# Patient Record
Sex: MALE | Race: WHITE | Employment: OTHER | URBAN - METROPOLITAN AREA
[De-identification: names, ages, dates, MRNs, and addresses within clinical notes are randomized per-mention and may not be internally consistent; named-entity substitution may affect disease eponyms.]

---

## 2017-11-16 ENCOUNTER — OFFICE VISIT (OUTPATIENT)
Dept: SURGERY | Age: 67
End: 2017-11-16

## 2017-11-16 ENCOUNTER — TELEPHONE (OUTPATIENT)
Dept: SURGERY | Age: 67
End: 2017-11-16

## 2017-11-16 VITALS
SYSTOLIC BLOOD PRESSURE: 147 MMHG | HEART RATE: 83 BPM | DIASTOLIC BLOOD PRESSURE: 84 MMHG | HEIGHT: 72 IN | WEIGHT: 225 LBS | BODY MASS INDEX: 30.48 KG/M2

## 2017-11-16 DIAGNOSIS — Z86.010 HISTORY OF COLON POLYPS: ICD-10-CM

## 2017-11-16 DIAGNOSIS — K42.9 UMBILICAL HERNIA WITHOUT OBSTRUCTION AND WITHOUT GANGRENE: Primary | ICD-10-CM

## 2017-11-16 PROCEDURE — 99204 OFFICE O/P NEW MOD 45 MIN: CPT | Performed by: SURGERY

## 2017-11-16 ASSESSMENT — ENCOUNTER SYMPTOMS
ANAL BLEEDING: 0
APNEA: 0
PHOTOPHOBIA: 0
CHOKING: 0
BACK PAIN: 0
CONSTIPATION: 0
SORE THROAT: 0
EYE ITCHING: 0
STRIDOR: 0
ABDOMINAL PAIN: 1
EYE REDNESS: 0
COLOR CHANGE: 0
RECTAL PAIN: 0

## 2017-11-16 NOTE — PROGRESS NOTES
Chief Complaint   Patient presents with    Colon Cancer Screening     n/p-consult c-scope - hernia        SUBJECTIVE:  HPI: Pt presents today for evaluation and possible need of colonoscopy. Pt has several had colonoscopy in the past.  Patient has been experiencing no pain or bleedign. Denies fever. There is no family history of colon cancer. Thoroughly reviewed the patient's medical history, family history, social history and review of systems with the patient today in the office. Please see medical record for pertinent positives. Past Medical History:   Diagnosis Date    Arthritis     Hypertension     Unspecified cerebral artery occlusion with cerebral infarction 1997    no residual effects      Past Surgical History:   Procedure Laterality Date    BUNIONECTOMY      right great toe    COLONOSCOPY      JOINT REPLACEMENT      rigth total knee     Current Outpatient Prescriptions   Medication Sig Dispense Refill    clopidogrel (PLAVIX) 75 MG tablet Take 75 mg by mouth daily.  amLODIPine-atorvastatatin (CADUET) 5-10 MG per tablet Take 1 tablet by mouth daily.  meloxicam (MOBIC) 15 MG tablet Take 15 mg by mouth daily.  esomeprazole Magnesium (NEXIUM) 40 MG PACK Take 40 mg by mouth daily.  diazepam (VALIUM) 10 MG tablet Take 10 mg by mouth every 6 hours as needed.  traMADol (ULTRAM) 50 MG tablet Take 50 mg by mouth every 6 hours as needed.  gabapentin (NEURONTIN) 300 MG capsule Take 300 mg by mouth 3 times daily.  multivitamin (THERAGRAN) per tablet Take 1 tablet by mouth daily.  acetaminophen (TYLENOL) 325 MG tablet Take 500 mg by mouth every 6 hours as needed. No current facility-administered medications for this visit. No Known Allergies    Review of Systems:         Review of Systems   Constitutional: Negative for chills and fever. HENT: Negative for ear pain, mouth sores, sore throat and tinnitus.     Eyes: Negative for photophobia, redness and itching. Respiratory: Negative for apnea, choking and stridor. Cardiovascular: Negative for chest pain and palpitations. Gastrointestinal: Positive for abdominal pain. Negative for anal bleeding, constipation and rectal pain. Endocrine: Negative for polydipsia. Genitourinary: Negative for enuresis, flank pain and hematuria. Musculoskeletal: Negative for back pain, joint swelling and myalgias. Skin: Negative for color change and pallor. Allergic/Immunologic: Negative for environmental allergies. Neurological: Negative for syncope and speech difficulty. Psychiatric/Behavioral: Negative for confusion and hallucinations. OBJECTIVE:  Physical Exam:    BP (!) 147/84   Pulse 83   Ht 6' (1.829 m)   Wt 225 lb (102.1 kg)   BMI 30.52 kg/m²      Physical Exam   Constitutional: He is oriented to person, place, and time. He appears well-developed and well-nourished. HENT:   Head: Normocephalic. Eyes: Pupils are equal, round, and reactive to light. Neck: Normal range of motion. Neck supple. Cardiovascular: Normal rate. Pulmonary/Chest: Effort normal.   Abdominal: Soft. He exhibits no distension and no mass. There is no tenderness. There is no rebound and no guarding. Musculoskeletal: Normal range of motion. Neurological: He is alert and oriented to person, place, and time. Skin: Skin is warm. Psychiatric: He has a normal mood and affect. ASSESSMENT:  1. Umbilical hernia without obstruction and without gangrene    2. History of colon polyps          PLAN:  Treatment:  Will proceed with colonoscopy. Pt wants to hold off the umbilical hernia for now. Pt does c/o left inguinal hernia bulge. Patient counseled on risks, benefits, and alternatives of treatment plan at length. Patient states an understanding and willingness to proceed with plan. Consent signed and prep instructions provided.      No orders of the defined types were placed in this

## 2017-11-16 NOTE — TELEPHONE ENCOUNTER
SPOKE TO Ale[facility] 76 @ . NOTIFIED OF DATES, TIMES AND LOCATION.     PHONE ASSESSMENT  CSCOPE - 11/27/17 @ 812  P/O - 12/7/17 @ [address] [facility] START ON 11/26/17 4PM/3AM  NPO AFTER MIDNIGHT  HOLD PLAVIX 5 DAYS PRIOR   AND PREP GIVEN TO PATIENT

## 2017-11-27 ENCOUNTER — HOSPITAL ENCOUNTER (OUTPATIENT)
Dept: OTHER | Age: 67
Discharge: HOME | End: 2017-11-27
Attending: SURGERY | Admitting: SURGERY

## 2017-12-07 ENCOUNTER — OFFICE VISIT (OUTPATIENT)
Dept: SURGERY | Age: 67
End: 2017-12-07

## 2017-12-07 VITALS
BODY MASS INDEX: 30.49 KG/M2 | DIASTOLIC BLOOD PRESSURE: 80 MMHG | SYSTOLIC BLOOD PRESSURE: 130 MMHG | HEART RATE: 76 BPM | HEIGHT: 72 IN | WEIGHT: 225.09 LBS

## 2017-12-07 DIAGNOSIS — D12.4 ADENOMATOUS POLYP OF DESCENDING COLON: Primary | ICD-10-CM

## 2017-12-07 PROCEDURE — 99213 OFFICE O/P EST LOW 20 MIN: CPT | Performed by: SURGERY

## 2017-12-07 ASSESSMENT — ENCOUNTER SYMPTOMS
STRIDOR: 0
COLOR CHANGE: 0
CONSTIPATION: 0
BACK PAIN: 0
EYE ITCHING: 0
RECTAL PAIN: 0
ANAL BLEEDING: 0
PHOTOPHOBIA: 0
APNEA: 0
SORE THROAT: 0
EYE REDNESS: 0
CHOKING: 0

## 2017-12-08 NOTE — PROGRESS NOTES
Chief Complaint   Patient presents with    Colon Cancer Screening     post c-scope GC 11/27/17        SUBJECTIVE:  HPI: Patient presents for follow up after colonoscopy. Patient is feeling well, denies severe abdominal pain, bloating, rectal bleeding after the procedure. Path reveals:   Final Pathologic Diagnosis:  Sigmoid colon, polyp:      Hyperplastic polyp    Thoroughly reviewed the patient's medical history, family history, social history and review of systems with the patient today in the office. Please see medical record for pertinent positives. Past Medical History:   Diagnosis Date    Arthritis     Hypertension     Unspecified cerebral artery occlusion with cerebral infarction 1997    no residual effects      Past Surgical History:   Procedure Laterality Date    BUNIONECTOMY      right great toe    COLONOSCOPY      JOINT REPLACEMENT      rigth total knee     Current Outpatient Prescriptions   Medication Sig Dispense Refill    clopidogrel (PLAVIX) 75 MG tablet Take 75 mg by mouth daily.  amLODIPine-atorvastatatin (CADUET) 5-10 MG per tablet Take 1 tablet by mouth daily.  meloxicam (MOBIC) 15 MG tablet Take 15 mg by mouth daily.  esomeprazole Magnesium (NEXIUM) 40 MG PACK Take 40 mg by mouth daily.  diazepam (VALIUM) 10 MG tablet Take 10 mg by mouth every 6 hours as needed.  traMADol (ULTRAM) 50 MG tablet Take 50 mg by mouth every 6 hours as needed.  gabapentin (NEURONTIN) 300 MG capsule Take 300 mg by mouth 3 times daily.  multivitamin (THERAGRAN) per tablet Take 1 tablet by mouth daily.  acetaminophen (TYLENOL) 325 MG tablet Take 500 mg by mouth every 6 hours as needed. No current facility-administered medications for this visit. No Known Allergies    Review of Systems:         Review of Systems   Constitutional: Negative for chills and fever. HENT: Negative for ear pain, mouth sores, sore throat and tinnitus.

## 2018-12-18 ENCOUNTER — HOSPITAL ENCOUNTER (OUTPATIENT)
Dept: GENERAL RADIOLOGY | Age: 68
Discharge: HOME | End: 2018-12-18
Payer: MEDICARE

## 2018-12-18 ENCOUNTER — HOSPITAL ENCOUNTER (OUTPATIENT)
Age: 68
Discharge: HOME | End: 2018-12-18
Payer: MEDICARE

## 2018-12-18 DIAGNOSIS — M75.21 BICEPS TENDINITIS OF RIGHT UPPER EXTREMITY: ICD-10-CM

## 2018-12-18 PROCEDURE — 73030 X-RAY EXAM OF SHOULDER: CPT

## 2019-01-16 ENCOUNTER — HOSPITAL ENCOUNTER (INPATIENT)
Age: 69
LOS: 2 days | Discharge: HOME | DRG: 200 | End: 2019-01-18
Attending: EMERGENCY MEDICINE | Admitting: INTERNAL MEDICINE
Payer: MEDICARE

## 2019-01-16 ENCOUNTER — APPOINTMENT (OUTPATIENT)
Dept: GENERAL RADIOLOGY | Age: 69
DRG: 200 | End: 2019-01-16
Payer: MEDICARE

## 2019-01-16 ENCOUNTER — APPOINTMENT (OUTPATIENT)
Dept: CT IMAGING | Age: 69
DRG: 200 | End: 2019-01-16
Payer: MEDICARE

## 2019-01-16 DIAGNOSIS — J94.2 HEMOTHORAX ON RIGHT: ICD-10-CM

## 2019-01-16 DIAGNOSIS — S22.41XA CLOSED FRACTURE OF MULTIPLE RIBS OF RIGHT SIDE, INITIAL ENCOUNTER: ICD-10-CM
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
## 2019-01-16 LAB
ALBUMIN SERPL-MCNC: 4.2 GM/DL (ref 3.4–5)
ALP BLD-CCNC: 61 IU/L (ref 40–129)
ALT SERPL-CCNC: 22 U/L (ref 10–40)
ANION GAP SERPL CALCULATED.3IONS-SCNC: 12 MMOL/L (ref 4–16)
APTT: 26.3 SECONDS (ref 21.2–33)
AST SERPL-CCNC: 25 IU/L (ref 15–37)
BASOPHILS ABSOLUTE: 0 K/CU MM
BASOPHILS RELATIVE PERCENT: 0.1 % (ref 0–1)
BILIRUB SERPL-MCNC: 0.4 MG/DL (ref 0–1)
BILIRUBIN DIRECT: 0.2 MG/DL (ref 0–0.3)
BILIRUBIN, INDIRECT: 0.2 MG/DL (ref 0–0.7)
BUN BLDV-MCNC: 17 MG/DL (ref 6–23)
CALCIUM SERPL-MCNC: 9 MG/DL (ref 8.3–10.6)
CHLORIDE BLD-SCNC: 101 MMOL/L (ref 99–110)
CO2: 26 MMOL/L (ref 21–32)
CREAT SERPL-MCNC: 0.8 MG/DL (ref 0.9–1.3)
DIFFERENTIAL TYPE: ABNORMAL
EOSINOPHILS ABSOLUTE: 0 K/CU MM
EOSINOPHILS RELATIVE PERCENT: 0.4 % (ref 0–3)
GFR AFRICAN AMERICAN: >60 ML/MIN/1.73M2
GFR NON-AFRICAN AMERICAN: >60 ML/MIN/1.73M2
GLUCOSE BLD-MCNC: 115 MG/DL (ref 70–99)
HCT VFR BLD CALC: 46.6 % (ref 42–52)
HCT VFR BLD CALC: 47.6 % (ref 42–52)
HEMOGLOBIN: 15.3 GM/DL (ref 13.5–18)
HEMOGLOBIN: 15.7 GM/DL (ref 13.5–18)
IMMATURE NEUTROPHIL %: 0.4 % (ref 0–0.43)
INR BLD: 0.93 INDEX
LYMPHOCYTES ABSOLUTE: 1.2 K/CU MM
LYMPHOCYTES RELATIVE PERCENT: 13.2 % (ref 24–44)
MCH RBC QN AUTO: 32.2 PG (ref 27–31)
MCHC RBC AUTO-ENTMCNC: 33 % (ref 32–36)
MCV RBC AUTO: 97.5 FL (ref 78–100)
MONOCYTES ABSOLUTE: 0.8 K/CU MM
MONOCYTES RELATIVE PERCENT: 8.3 % (ref 0–4)
NUCLEATED RBC %: 0 %
PDW BLD-RTO: 13.3 % (ref 11.7–14.9)
PLATELET # BLD: 165 K/CU MM (ref 140–440)
PMV BLD AUTO: 9.7 FL (ref 7.5–11.1)
POTASSIUM SERPL-SCNC: 4.3 MMOL/L (ref 3.5–5.1)
PROTHROMBIN TIME: 10.6 SECONDS (ref 9.12–12.5)
RBC # BLD: 4.88 M/CU MM (ref 4.6–6.2)
SEGMENTED NEUTROPHILS ABSOLUTE COUNT: 7.2 K/CU MM
SEGMENTED NEUTROPHILS RELATIVE PERCENT: 77.6 % (ref 36–66)
SODIUM BLD-SCNC: 139 MMOL/L (ref 135–145)
TOTAL IMMATURE NEUTOROPHIL: 0.04 K/CU MM
TOTAL NUCLEATED RBC: 0 K/CU MM
TOTAL PROTEIN: 7 GM/DL (ref 6.4–8.2)
TOTAL RETICULOCYTE COUNT: 0.12 K/CU MM
WBC # BLD: 9.3 K/CU MM (ref 4–10.5)

## 2019-01-16 PROCEDURE — 85025 COMPLETE CBC W/AUTO DIFF WBC: CPT

## 2019-01-16 PROCEDURE — 1200000000 HC SEMI PRIVATE

## 2019-01-16 PROCEDURE — 6370000000 HC RX 637 (ALT 250 FOR IP): Performed by: PHYSICIAN ASSISTANT

## 2019-01-16 PROCEDURE — 85018 HEMOGLOBIN: CPT

## 2019-01-16 PROCEDURE — 2580000003 HC RX 258: Performed by: INTERNAL MEDICINE

## 2019-01-16 PROCEDURE — 85730 THROMBOPLASTIN TIME PARTIAL: CPT

## 2019-01-16 PROCEDURE — 99285 EMERGENCY DEPT VISIT HI MDM: CPT

## 2019-01-16 PROCEDURE — 82248 BILIRUBIN DIRECT: CPT

## 2019-01-16 PROCEDURE — 6360000002 HC RX W HCPCS: Performed by: INTERNAL MEDICINE

## 2019-01-16 PROCEDURE — 86900 BLOOD TYPING SEROLOGIC ABO: CPT

## 2019-01-16 PROCEDURE — 86850 RBC ANTIBODY SCREEN: CPT

## 2019-01-16 PROCEDURE — 94640 AIRWAY INHALATION TREATMENT: CPT

## 2019-01-16 PROCEDURE — 86901 BLOOD TYPING SEROLOGIC RH(D): CPT

## 2019-01-16 PROCEDURE — 6370000000 HC RX 637 (ALT 250 FOR IP)

## 2019-01-16 PROCEDURE — 71250 CT THORAX DX C-: CPT

## 2019-01-16 PROCEDURE — 85014 HEMATOCRIT: CPT

## 2019-01-16 PROCEDURE — 80053 COMPREHEN METABOLIC PANEL: CPT

## 2019-01-16 PROCEDURE — 85610 PROTHROMBIN TIME: CPT

## 2019-01-16 PROCEDURE — 36415 COLL VENOUS BLD VENIPUNCTURE: CPT

## 2019-01-16 PROCEDURE — 6370000000 HC RX 637 (ALT 250 FOR IP): Performed by: INTERNAL MEDICINE

## 2019-01-16 RX ORDER — MORPHINE SULFATE 4 MG/ML
4 INJECTION, SOLUTION INTRAMUSCULAR; INTRAVENOUS EVERY 30 MIN PRN
Status: DISCONTINUED | OUTPATIENT
Start: 2019-01-16 | End: 2019-01-16

## 2019-01-16 RX ORDER — AMLODIPINE BESYLATE AND ATORVASTATIN CALCIUM 5; 10 MG/1; MG/1
1 TABLET, FILM COATED ORAL DAILY
Status: DISCONTINUED | OUTPATIENT
Start: 2019-01-16 | End: 2019-01-16 | Stop reason: CLARIF

## 2019-01-16 RX ORDER — SODIUM CHLORIDE 0.9 % (FLUSH) 0.9 %
10 SYRINGE (ML) INJECTION PRN
Status: DISCONTINUED | OUTPATIENT
Start: 2019-01-16 | End: 2019-01-18 | Stop reason: HOSPADM

## 2019-01-16 RX ORDER — HYDROCHLOROTHIAZIDE 12.5 MG/1
12.5 TABLET ORAL DAILY
Status: ON HOLD | COMMUNITY
Start: 2018-11-09 | End: 2020-03-17 | Stop reason: HOSPADM

## 2019-01-16 RX ORDER — SODIUM CHLORIDE 0.9 % (FLUSH) 0.9 %
10 SYRINGE (ML) INJECTION EVERY 12 HOURS SCHEDULED
Status: DISCONTINUED | OUTPATIENT
Start: 2019-01-16 | End: 2019-01-18 | Stop reason: HOSPADM

## 2019-01-16 RX ORDER — OXYCODONE HYDROCHLORIDE 5 MG/1
5 TABLET ORAL
Status: DISCONTINUED | OUTPATIENT
Start: 2019-01-16 | End: 2019-01-16

## 2019-01-16 RX ORDER — OXYCODONE HYDROCHLORIDE AND ACETAMINOPHEN 5; 325 MG/1; MG/1
1 TABLET ORAL ONCE
Status: COMPLETED | OUTPATIENT
Start: 2019-01-16 | End: 2019-01-16

## 2019-01-16 RX ORDER — OXYCODONE HYDROCHLORIDE AND ACETAMINOPHEN 5; 325 MG/1; MG/1
2 TABLET ORAL ONCE
Status: COMPLETED | OUTPATIENT
Start: 2019-01-16 | End: 2019-01-16

## 2019-01-16 RX ORDER — OXYCODONE HYDROCHLORIDE AND ACETAMINOPHEN 5; 325 MG/1; MG/1
1 TABLET ORAL EVERY 4 HOURS PRN
Status: DISCONTINUED | OUTPATIENT
Start: 2019-01-16 | End: 2019-01-18 | Stop reason: HOSPADM

## 2019-01-16 RX ORDER — ONDANSETRON 2 MG/ML
4 INJECTION INTRAMUSCULAR; INTRAVENOUS EVERY 6 HOURS PRN
Status: DISCONTINUED | OUTPATIENT
Start: 2019-01-16 | End: 2019-01-18 | Stop reason: HOSPADM

## 2019-01-16 RX ORDER — CITALOPRAM 10 MG/1
10 TABLET ORAL NIGHTLY
Status: ON HOLD | COMMUNITY
Start: 2018-11-23 | End: 2020-03-17 | Stop reason: HOSPADM

## 2019-01-16 RX ORDER — MORPHINE SULFATE 4 MG/ML
4 INJECTION, SOLUTION INTRAMUSCULAR; INTRAVENOUS
Status: DISCONTINUED | OUTPATIENT
Start: 2019-01-16 | End: 2019-01-18 | Stop reason: HOSPADM

## 2019-01-16 RX ORDER — OXYCODONE HYDROCHLORIDE AND ACETAMINOPHEN 5; 325 MG/1; MG/1
2 TABLET ORAL EVERY 4 HOURS PRN
Status: DISCONTINUED | OUTPATIENT
Start: 2019-01-16 | End: 2019-01-18 | Stop reason: HOSPADM

## 2019-01-16 RX ORDER — TRAMADOL HYDROCHLORIDE 50 MG/1
50 TABLET ORAL EVERY 6 HOURS PRN
Status: DISCONTINUED | OUTPATIENT
Start: 2019-01-16 | End: 2019-01-18 | Stop reason: HOSPADM

## 2019-01-16 RX ORDER — MORPHINE SULFATE 4 MG/ML
2 INJECTION, SOLUTION INTRAMUSCULAR; INTRAVENOUS
Status: DISCONTINUED | OUTPATIENT
Start: 2019-01-16 | End: 2019-01-18 | Stop reason: HOSPADM

## 2019-01-16 RX ORDER — OXYCODONE HYDROCHLORIDE AND ACETAMINOPHEN 5; 325 MG/1; MG/1
TABLET ORAL
Status: COMPLETED
Start: 2019-01-16 | End: 2019-01-16

## 2019-01-16 RX ORDER — ESOMEPRAZOLE MAGNESIUM 40 MG/1
40 FOR SUSPENSION ORAL DAILY
Status: DISCONTINUED | OUTPATIENT
Start: 2019-01-16 | End: 2019-01-16 | Stop reason: CLARIF

## 2019-01-16 RX ORDER — ACETAMINOPHEN 500 MG
500 TABLET ORAL EVERY 6 HOURS PRN
Status: DISCONTINUED | OUTPATIENT
Start: 2019-01-16 | End: 2019-01-18 | Stop reason: HOSPADM

## 2019-01-16 RX ORDER — DOCUSATE SODIUM 100 MG/1
100 CAPSULE, LIQUID FILLED ORAL 2 TIMES DAILY
Status: DISCONTINUED | OUTPATIENT
Start: 2019-01-16 | End: 2019-01-18 | Stop reason: HOSPADM

## 2019-01-16 RX ORDER — ATORVASTATIN CALCIUM 10 MG/1
10 TABLET, FILM COATED ORAL NIGHTLY
Status: DISCONTINUED | OUTPATIENT
Start: 2019-01-16 | End: 2019-01-18 | Stop reason: HOSPADM

## 2019-01-16 RX ORDER — GABAPENTIN 300 MG/1
300 CAPSULE ORAL 3 TIMES DAILY
Status: DISCONTINUED | OUTPATIENT
Start: 2019-01-16 | End: 2019-01-18 | Stop reason: HOSPADM

## 2019-01-16 RX ORDER — IPRATROPIUM BROMIDE AND ALBUTEROL SULFATE 2.5; .5 MG/3ML; MG/3ML
1 SOLUTION RESPIRATORY (INHALATION) ONCE
Status: DISCONTINUED | OUTPATIENT
Start: 2019-01-16 | End: 2019-01-16

## 2019-01-16 RX ORDER — AMLODIPINE BESYLATE 5 MG/1
5 TABLET ORAL DAILY
Status: DISCONTINUED | OUTPATIENT
Start: 2019-01-16 | End: 2019-01-18 | Stop reason: HOSPADM

## 2019-01-16 RX ORDER — SODIUM CHLORIDE 9 MG/ML
INJECTION, SOLUTION INTRAVENOUS CONTINUOUS
Status: DISCONTINUED | OUTPATIENT
Start: 2019-01-16 | End: 2019-01-18

## 2019-01-16 RX ORDER — KETOROLAC TROMETHAMINE 30 MG/ML
15 INJECTION, SOLUTION INTRAMUSCULAR; INTRAVENOUS EVERY 6 HOURS PRN
Status: DISCONTINUED | OUTPATIENT
Start: 2019-01-16 | End: 2019-01-18 | Stop reason: HOSPADM

## 2019-01-16 RX ORDER — ACETAMINOPHEN 325 MG/1
650 TABLET ORAL EVERY 4 HOURS PRN
Status: DISCONTINUED | OUTPATIENT
Start: 2019-01-16 | End: 2019-01-16 | Stop reason: SDUPTHER

## 2019-01-16 RX ORDER — M-VIT,TX,IRON,MINS/CALC/FOLIC 27MG-0.4MG
1 TABLET ORAL DAILY
Status: DISCONTINUED | OUTPATIENT
Start: 2019-01-16 | End: 2019-01-18 | Stop reason: HOSPADM

## 2019-01-16 RX ORDER — ONDANSETRON 2 MG/ML
4 INJECTION INTRAMUSCULAR; INTRAVENOUS EVERY 30 MIN PRN
Status: DISCONTINUED | OUTPATIENT
Start: 2019-01-16 | End: 2019-01-16

## 2019-01-16 RX ORDER — PANTOPRAZOLE SODIUM 40 MG/1
40 TABLET, DELAYED RELEASE ORAL
Status: DISCONTINUED | OUTPATIENT
Start: 2019-01-17 | End: 2019-01-18 | Stop reason: HOSPADM

## 2019-01-16 RX ORDER — OXYCODONE HYDROCHLORIDE 10 MG/1
10 TABLET ORAL
Status: DISCONTINUED | OUTPATIENT
Start: 2019-01-16 | End: 2019-01-16

## 2019-01-16 RX ORDER — POLYETHYLENE GLYCOL 3350 17 G/17G
17 POWDER, FOR SOLUTION ORAL DAILY
Status: DISCONTINUED | OUTPATIENT
Start: 2019-01-16 | End: 2019-01-18 | Stop reason: HOSPADM

## 2019-01-16 RX ORDER — LIDOCAINE 4 G/G
2 PATCH TOPICAL DAILY
Status: DISCONTINUED | OUTPATIENT
Start: 2019-01-16 | End: 2019-01-18 | Stop reason: HOSPADM

## 2019-01-16 RX ADMIN — OXYCODONE HYDROCHLORIDE AND ACETAMINOPHEN 1 TABLET: 5; 325 TABLET ORAL at 15:44

## 2019-01-16 RX ADMIN — MORPHINE SULFATE 4 MG: 4 INJECTION INTRAVENOUS at 21:40

## 2019-01-16 RX ADMIN — IPRATROPIUM BROMIDE AND ALBUTEROL SULFATE 1 AMPULE: .5; 3 SOLUTION RESPIRATORY (INHALATION) at 13:24

## 2019-01-16 RX ADMIN — POLYETHYLENE GLYCOL (3350) 17 G: 17 POWDER, FOR SOLUTION ORAL at 19:17

## 2019-01-16 RX ADMIN — DOCUSATE SODIUM 100 MG: 100 CAPSULE, LIQUID FILLED ORAL at 19:16

## 2019-01-16 RX ADMIN — GABAPENTIN 300 MG: 300 CAPSULE ORAL at 21:39

## 2019-01-16 RX ADMIN — MULTIPLE VITAMINS W/ MINERALS TAB 1 TABLET: TAB at 19:01

## 2019-01-16 RX ADMIN — OXYCODONE AND ACETAMINOPHEN 2 TABLET: 5; 325 TABLET ORAL at 19:16

## 2019-01-16 RX ADMIN — SODIUM CHLORIDE: 9 INJECTION, SOLUTION INTRAVENOUS at 19:17

## 2019-01-16 ASSESSMENT — PAIN DESCRIPTION - ORIENTATION
ORIENTATION: RIGHT
ORIENTATION: RIGHT

## 2019-01-16 ASSESSMENT — PAIN DESCRIPTION - LOCATION
LOCATION: BACK;RIB CAGE
LOCATION: BACK;RIB CAGE
LOCATION: RIB CAGE

## 2019-01-16 ASSESSMENT — PAIN SCALES - GENERAL
PAINLEVEL_OUTOF10: 7

## 2019-01-16 ASSESSMENT — PAIN DESCRIPTION - DESCRIPTORS
DESCRIPTORS: SHARP
DESCRIPTORS: SHARP

## 2019-01-16 ASSESSMENT — PAIN DESCRIPTION - FREQUENCY
FREQUENCY: CONTINUOUS
FREQUENCY: CONTINUOUS

## 2019-01-16 ASSESSMENT — PAIN DESCRIPTION - PAIN TYPE
TYPE: ACUTE PAIN

## 2019-01-17 ENCOUNTER — APPOINTMENT (OUTPATIENT)
Dept: GENERAL RADIOLOGY | Age: 69
DRG: 200 | End: 2019-01-17
Payer: MEDICARE

## 2019-01-17 LAB
ANION GAP SERPL CALCULATED.3IONS-SCNC: 9 MMOL/L (ref 4–16)
BUN BLDV-MCNC: 13 MG/DL (ref 6–23)
CALCIUM SERPL-MCNC: 8.8 MG/DL (ref 8.3–10.6)
CHLORIDE BLD-SCNC: 98 MMOL/L (ref 99–110)
CO2: 30 MMOL/L (ref 21–32)
CREAT SERPL-MCNC: 0.7 MG/DL (ref 0.9–1.3)
GFR AFRICAN AMERICAN: >60 ML/MIN/1.73M2
GFR NON-AFRICAN AMERICAN: >60 ML/MIN/1.73M2
GLUCOSE BLD-MCNC: 118 MG/DL (ref 70–99)
HCT VFR BLD CALC: 43.3 % (ref 42–52)
HCT VFR BLD CALC: 45 % (ref 42–52)
HCT VFR BLD CALC: 46.8 % (ref 42–52)
HEMOGLOBIN: 14.1 GM/DL (ref 13.5–18)
HEMOGLOBIN: 14.4 GM/DL (ref 13.5–18)
HEMOGLOBIN: 14.9 GM/DL (ref 13.5–18)
MCH RBC QN AUTO: 32.3 PG (ref 27–31)
MCHC RBC AUTO-ENTMCNC: 32.6 % (ref 32–36)
MCV RBC AUTO: 99.1 FL (ref 78–100)
PDW BLD-RTO: 13.7 % (ref 11.7–14.9)
PLATELET # BLD: 137 K/CU MM (ref 140–440)
PMV BLD AUTO: 9.7 FL (ref 7.5–11.1)
POTASSIUM SERPL-SCNC: 4.2 MMOL/L (ref 3.5–5.1)
RBC # BLD: 4.37 M/CU MM (ref 4.6–6.2)
SODIUM BLD-SCNC: 137 MMOL/L (ref 135–145)
WBC # BLD: 6.3 K/CU MM (ref 4–10.5)

## 2019-01-17 PROCEDURE — 6370000000 HC RX 637 (ALT 250 FOR IP): Performed by: INTERNAL MEDICINE

## 2019-01-17 PROCEDURE — 71046 X-RAY EXAM CHEST 2 VIEWS: CPT

## 2019-01-17 PROCEDURE — 85018 HEMOGLOBIN: CPT

## 2019-01-17 PROCEDURE — 85027 COMPLETE CBC AUTOMATED: CPT

## 2019-01-17 PROCEDURE — 97162 PT EVAL MOD COMPLEX 30 MIN: CPT

## 2019-01-17 PROCEDURE — 97116 GAIT TRAINING THERAPY: CPT

## 2019-01-17 PROCEDURE — 97535 SELF CARE MNGMENT TRAINING: CPT

## 2019-01-17 PROCEDURE — 36415 COLL VENOUS BLD VENIPUNCTURE: CPT

## 2019-01-17 PROCEDURE — 2580000003 HC RX 258: Performed by: INTERNAL MEDICINE

## 2019-01-17 PROCEDURE — 6360000002 HC RX W HCPCS: Performed by: INTERNAL MEDICINE

## 2019-01-17 PROCEDURE — 1200000000 HC SEMI PRIVATE

## 2019-01-17 PROCEDURE — 80048 BASIC METABOLIC PNL TOTAL CA: CPT

## 2019-01-17 PROCEDURE — 97165 OT EVAL LOW COMPLEX 30 MIN: CPT

## 2019-01-17 PROCEDURE — 85014 HEMATOCRIT: CPT

## 2019-01-17 RX ORDER — CLOPIDOGREL BISULFATE 75 MG/1
75 TABLET ORAL DAILY
Status: DISCONTINUED | OUTPATIENT
Start: 2019-01-17 | End: 2019-01-17

## 2019-01-17 RX ORDER — CITALOPRAM 20 MG/1
10 TABLET ORAL NIGHTLY
Status: DISCONTINUED | OUTPATIENT
Start: 2019-01-17 | End: 2019-01-18 | Stop reason: HOSPADM

## 2019-01-17 RX ORDER — HYDROCHLOROTHIAZIDE 12.5 MG/1
12.5 TABLET ORAL DAILY
Status: DISCONTINUED | OUTPATIENT
Start: 2019-01-17 | End: 2019-01-18 | Stop reason: HOSPADM

## 2019-01-17 RX ORDER — ACETAMINOPHEN 80 MG
TABLET,CHEWABLE ORAL
Status: COMPLETED
Start: 2019-01-17 | End: 2019-01-17

## 2019-01-17 RX ADMIN — OXYCODONE AND ACETAMINOPHEN 2 TABLET: 5; 325 TABLET ORAL at 18:40

## 2019-01-17 RX ADMIN — OXYCODONE AND ACETAMINOPHEN 2 TABLET: 5; 325 TABLET ORAL at 14:15

## 2019-01-17 RX ADMIN — CITALOPRAM HYDROBROMIDE 10 MG: 20 TABLET ORAL at 23:00

## 2019-01-17 RX ADMIN — SODIUM CHLORIDE: 9 INJECTION, SOLUTION INTRAVENOUS at 17:46

## 2019-01-17 RX ADMIN — GABAPENTIN 300 MG: 300 CAPSULE ORAL at 14:11

## 2019-01-17 RX ADMIN — Medication: at 22:59

## 2019-01-17 RX ADMIN — DOCUSATE SODIUM 100 MG: 100 CAPSULE, LIQUID FILLED ORAL at 08:58

## 2019-01-17 RX ADMIN — AMLODIPINE BESYLATE 5 MG: 5 TABLET ORAL at 08:59

## 2019-01-17 RX ADMIN — OXYCODONE AND ACETAMINOPHEN 2 TABLET: 5; 325 TABLET ORAL at 08:58

## 2019-01-17 RX ADMIN — DOCUSATE SODIUM 100 MG: 100 CAPSULE, LIQUID FILLED ORAL at 22:59

## 2019-01-17 RX ADMIN — GABAPENTIN 300 MG: 300 CAPSULE ORAL at 22:59

## 2019-01-17 RX ADMIN — ATORVASTATIN CALCIUM 10 MG: 10 TABLET, FILM COATED ORAL at 22:59

## 2019-01-17 RX ADMIN — MULTIPLE VITAMINS W/ MINERALS TAB 1 TABLET: TAB at 08:59

## 2019-01-17 RX ADMIN — PANTOPRAZOLE SODIUM 40 MG: 40 TABLET, DELAYED RELEASE ORAL at 06:11

## 2019-01-17 RX ADMIN — MORPHINE SULFATE 4 MG: 4 INJECTION INTRAVENOUS at 23:00

## 2019-01-17 RX ADMIN — OXYCODONE AND ACETAMINOPHEN 2 TABLET: 5; 325 TABLET ORAL at 04:44

## 2019-01-17 RX ADMIN — GABAPENTIN 300 MG: 300 CAPSULE ORAL at 08:58

## 2019-01-17 ASSESSMENT — PAIN DESCRIPTION - PAIN TYPE
TYPE: ACUTE PAIN

## 2019-01-17 ASSESSMENT — PAIN DESCRIPTION - ONSET
ONSET: ON-GOING

## 2019-01-17 ASSESSMENT — PAIN DESCRIPTION - ORIENTATION
ORIENTATION: RIGHT

## 2019-01-17 ASSESSMENT — PAIN SCALES - GENERAL
PAINLEVEL_OUTOF10: 7

## 2019-01-17 ASSESSMENT — PAIN DESCRIPTION - FREQUENCY
FREQUENCY: CONTINUOUS

## 2019-01-17 ASSESSMENT — PAIN DESCRIPTION - DIRECTION
RADIATING_TOWARDS: BACK
RADIATING_TOWARDS: BACK

## 2019-01-17 ASSESSMENT — PAIN DESCRIPTION - PROGRESSION
CLINICAL_PROGRESSION: GRADUALLY WORSENING
CLINICAL_PROGRESSION: GRADUALLY WORSENING

## 2019-01-17 ASSESSMENT — PAIN DESCRIPTION - DESCRIPTORS
DESCRIPTORS: ACHING;SHOOTING
DESCRIPTORS: SHARP;SHOOTING;SQUEEZING
DESCRIPTORS: SHARP;SHOOTING;SQUEEZING
DESCRIPTORS: RADIATING;SHOOTING
DESCRIPTORS: SHARP;SHOOTING;SQUEEZING

## 2019-01-17 ASSESSMENT — PAIN - FUNCTIONAL ASSESSMENT
PAIN_FUNCTIONAL_ASSESSMENT: PREVENTS OR INTERFERES SOME ACTIVE ACTIVITIES AND ADLS
PAIN_FUNCTIONAL_ASSESSMENT: PREVENTS OR INTERFERES SOME ACTIVE ACTIVITIES AND ADLS

## 2019-01-17 ASSESSMENT — PAIN DESCRIPTION - LOCATION
LOCATION: RIB CAGE
LOCATION: RIB CAGE
LOCATION: RIB CAGE;SHOULDER
LOCATION: RIB CAGE
LOCATION: RIB CAGE;SHOULDER

## 2019-01-18 ENCOUNTER — APPOINTMENT (OUTPATIENT)
Dept: GENERAL RADIOLOGY | Age: 69
DRG: 200 | End: 2019-01-18
Payer: MEDICARE

## 2019-01-18 VITALS
HEART RATE: 85 BPM | DIASTOLIC BLOOD PRESSURE: 72 MMHG | WEIGHT: 237.9 LBS | OXYGEN SATURATION: 92 % | SYSTOLIC BLOOD PRESSURE: 152 MMHG | TEMPERATURE: 99 F | HEIGHT: 72 IN | BODY MASS INDEX: 32.22 KG/M2 | RESPIRATION RATE: 18 BRPM

## 2019-01-18 PROCEDURE — 94762 N-INVAS EAR/PLS OXIMTRY CONT: CPT

## 2019-01-18 PROCEDURE — 94761 N-INVAS EAR/PLS OXIMETRY MLT: CPT

## 2019-01-18 PROCEDURE — 6370000000 HC RX 637 (ALT 250 FOR IP): Performed by: INTERNAL MEDICINE

## 2019-01-18 PROCEDURE — 71045 X-RAY EXAM CHEST 1 VIEW: CPT

## 2019-01-18 RX ORDER — POLYETHYLENE GLYCOL 3350 17 G/17G
17 POWDER, FOR SOLUTION ORAL DAILY
Qty: 527 G | Refills: 1 | Status: ON HOLD | OUTPATIENT
Start: 2019-01-19 | End: 2019-02-19 | Stop reason: HOSPADM

## 2019-01-18 RX ORDER — LIDOCAINE 4 G/G
2 PATCH TOPICAL DAILY
Qty: 60 PATCH | Refills: 0 | Status: ON HOLD | OUTPATIENT
Start: 2019-01-19 | End: 2020-03-17 | Stop reason: HOSPADM

## 2019-01-18 RX ORDER — OXYCODONE HYDROCHLORIDE AND ACETAMINOPHEN 5; 325 MG/1; MG/1
1 TABLET ORAL EVERY 6 HOURS PRN
Qty: 20 TABLET | Refills: 0 | Status: SHIPPED | OUTPATIENT
Start: 2019-01-18 | End: 2019-01-25

## 2019-01-18 RX ORDER — PSEUDOEPHEDRINE HCL 30 MG
100 TABLET ORAL 2 TIMES DAILY
Qty: 60 CAPSULE | Refills: 0 | Status: SHIPPED | OUTPATIENT
Start: 2019-01-18 | End: 2020-09-23

## 2019-01-18 RX ADMIN — OXYCODONE AND ACETAMINOPHEN 2 TABLET: 5; 325 TABLET ORAL at 11:27

## 2019-01-18 RX ADMIN — PANTOPRAZOLE SODIUM 40 MG: 40 TABLET, DELAYED RELEASE ORAL at 06:30

## 2019-01-18 RX ADMIN — MULTIPLE VITAMINS W/ MINERALS TAB 1 TABLET: TAB at 11:14

## 2019-01-18 RX ADMIN — GABAPENTIN 300 MG: 300 CAPSULE ORAL at 11:14

## 2019-01-18 RX ADMIN — AMLODIPINE BESYLATE 5 MG: 5 TABLET ORAL at 11:14

## 2019-01-18 RX ADMIN — OXYCODONE AND ACETAMINOPHEN 2 TABLET: 5; 325 TABLET ORAL at 06:30

## 2019-01-18 RX ADMIN — HYDROCHLOROTHIAZIDE 12.5 MG: 12.5 TABLET ORAL at 11:14

## 2019-01-18 RX ADMIN — DOCUSATE SODIUM 100 MG: 100 CAPSULE, LIQUID FILLED ORAL at 11:14

## 2019-01-18 ASSESSMENT — PAIN SCALES - GENERAL
PAINLEVEL_OUTOF10: 7
PAINLEVEL_OUTOF10: 7

## 2019-02-13 ENCOUNTER — HOSPITAL ENCOUNTER (OUTPATIENT)
Age: 69
Discharge: HOME | DRG: 199 | End: 2019-02-13
Payer: MEDICARE

## 2019-02-13 ENCOUNTER — APPOINTMENT (OUTPATIENT)
Dept: CT IMAGING | Age: 69
DRG: 199 | End: 2019-02-13
Payer: MEDICARE

## 2019-02-13 ENCOUNTER — HOSPITAL ENCOUNTER (OUTPATIENT)
Dept: GENERAL RADIOLOGY | Age: 69
Discharge: HOME | DRG: 199 | End: 2019-02-13
Payer: MEDICARE

## 2019-02-13 ENCOUNTER — HOSPITAL ENCOUNTER (INPATIENT)
Age: 69
LOS: 6 days | Discharge: HOME | DRG: 199 | End: 2019-02-19
Attending: EMERGENCY MEDICINE | Admitting: INTERNAL MEDICINE
Payer: MEDICARE

## 2019-02-13 DIAGNOSIS — J94.2 HEMOTHORAX: ICD-10-CM

## 2019-02-13 DIAGNOSIS — J94.2 HEMOTHORAX ON RIGHT: Primary | ICD-10-CM

## 2019-02-13 DIAGNOSIS — M25.512 LEFT SHOULDER PAIN, UNSPECIFIED CHRONICITY: ICD-10-CM

## 2019-02-13 DIAGNOSIS — S22.41XA CLOSED FRACTURE OF MULTIPLE RIBS OF RIGHT SIDE, INITIAL ENCOUNTER: ICD-10-CM

## 2019-02-13 DIAGNOSIS — N39.0 ACUTE UTI: ICD-10-CM

## 2019-02-13 PROBLEM — Z72.0 TOBACCO ABUSE: Status: ACTIVE | Noted: 2019-02-13

## 2019-02-13 PROBLEM — R29.6 FALLS FREQUENTLY: Status: ACTIVE | Noted: 2019-02-13

## 2019-02-13 LAB
ALBUMIN SERPL-MCNC: 3.1 GM/DL (ref 3.4–5)
ALP BLD-CCNC: 91 IU/L (ref 40–129)
ALT SERPL-CCNC: 25 U/L (ref 10–40)
ANION GAP SERPL CALCULATED.3IONS-SCNC: 14 MMOL/L (ref 4–16)
AST SERPL-CCNC: 30 IU/L (ref 15–37)
BACTERIA: ABNORMAL /HPF
BASE EXCESS MIXED: 7.3 (ref 0–1.2)
BASOPHILS ABSOLUTE: 0 K/CU MM
BASOPHILS RELATIVE PERCENT: 0.1 % (ref 0–1)
BILIRUB SERPL-MCNC: 0.7 MG/DL (ref 0–1)
BILIRUBIN URINE: NEGATIVE MG/DL
BLOOD, URINE: ABNORMAL
BUN BLDV-MCNC: 35 MG/DL (ref 6–23)
CALCIUM SERPL-MCNC: 8.5 MG/DL (ref 8.3–10.6)
CHLORIDE BLD-SCNC: 93 MMOL/L (ref 99–110)
CLARITY: ABNORMAL
CO2: 29 MMOL/L (ref 21–32)
COLOR: ABNORMAL
CREAT SERPL-MCNC: 1 MG/DL (ref 0.9–1.3)
DIFFERENTIAL TYPE: ABNORMAL
EOSINOPHILS ABSOLUTE: 0 K/CU MM
EOSINOPHILS RELATIVE PERCENT: 0 % (ref 0–3)
GFR AFRICAN AMERICAN: >60 ML/MIN/1.73M2
GFR NON-AFRICAN AMERICAN: >60 ML/MIN/1.73M2
GLUCOSE BLD-MCNC: 150 MG/DL (ref 70–99)
GLUCOSE, URINE: NEGATIVE MG/DL
HCO3 VENOUS: 33.6 MMOL/L (ref 19–25)
HCT VFR BLD CALC: 40.8 % (ref 42–52)
HEMOGLOBIN: 13 GM/DL (ref 13.5–18)
HYALINE CASTS: 2 /LPF
IMMATURE NEUTROPHIL %: 0.4 % (ref 0–0.43)
INR BLD: 1.35 INDEX
KETONES, URINE: ABNORMAL MG/DL
LACTATE: 1.1 MMOL/L (ref 0.4–2)
LEUKOCYTE ESTERASE, URINE: ABNORMAL
LYMPHOCYTES ABSOLUTE: 0.5 K/CU MM
LYMPHOCYTES RELATIVE PERCENT: 4 % (ref 24–44)
MCH RBC QN AUTO: 29.9 PG (ref 27–31)
MCHC RBC AUTO-ENTMCNC: 31.9 % (ref 32–36)
MCV RBC AUTO: 93.8 FL (ref 78–100)
MONOCYTES ABSOLUTE: 1.4 K/CU MM
MONOCYTES RELATIVE PERCENT: 11.2 % (ref 0–4)
MUCUS: ABNORMAL HPF
NITRITE URINE, QUANTITATIVE: NEGATIVE
NUCLEATED RBC %: 0 %
O2 SAT, VEN: 90.7 % (ref 50–70)
PCO2, VEN: 53 MMHG (ref 38–52)
PDW BLD-RTO: 13.2 % (ref 11.7–14.9)
PH VENOUS: 7.41 (ref 7.32–7.42)
PH, URINE: 5 (ref 5–8)
PLATELET # BLD: 296 K/CU MM (ref 140–440)
PMV BLD AUTO: 9.5 FL (ref 7.5–11.1)
PO2, VEN: 65 MMHG (ref 28–48)
POTASSIUM SERPL-SCNC: 3.9 MMOL/L (ref 3.5–5.1)
PROTEIN UA: 100 MG/DL
PROTHROMBIN TIME: 15.4 SECONDS (ref 9.12–12.5)
RBC # BLD: 4.35 M/CU MM (ref 4.6–6.2)
RBC URINE: 132 /HPF (ref 0–3)
SEGMENTED NEUTROPHILS ABSOLUTE COUNT: 10.8 K/CU MM
SEGMENTED NEUTROPHILS RELATIVE PERCENT: 84.3 % (ref 36–66)
SODIUM BLD-SCNC: 136 MMOL/L (ref 135–145)
SPECIFIC GRAVITY UA: 1.02 (ref 1–1.03)
SQUAMOUS EPITHELIAL: 5 /HPF
TOTAL IMMATURE NEUTOROPHIL: 0.05 K/CU MM
TOTAL NUCLEATED RBC: 0 K/CU MM
TOTAL PROTEIN: 6.8 GM/DL (ref 6.4–8.2)
TRICHOMONAS: ABNORMAL /HPF
UNCLASSIFIED CAST: 14 /LPF
UROBILINOGEN, URINE: 2 MG/DL (ref 0.2–1)
WBC # BLD: 12.8 K/CU MM (ref 4–10.5)
WBC UA: 602 /HPF (ref 0–2)
YEAST: ABNORMAL /HPF

## 2019-02-13 PROCEDURE — 87071 CULTURE AEROBIC QUANT OTHER: CPT

## 2019-02-13 PROCEDURE — 87086 URINE CULTURE/COLONY COUNT: CPT

## 2019-02-13 PROCEDURE — 6360000002 HC RX W HCPCS: Performed by: EMERGENCY MEDICINE

## 2019-02-13 PROCEDURE — 93005 ELECTROCARDIOGRAM TRACING: CPT | Performed by: EMERGENCY MEDICINE

## 2019-02-13 PROCEDURE — 70450 CT HEAD/BRAIN W/O DYE: CPT

## 2019-02-13 PROCEDURE — 2580000003 HC RX 258: Performed by: EMERGENCY MEDICINE

## 2019-02-13 PROCEDURE — 80053 COMPREHEN METABOLIC PANEL: CPT

## 2019-02-13 PROCEDURE — 99285 EMERGENCY DEPT VISIT HI MDM: CPT

## 2019-02-13 PROCEDURE — 36415 COLL VENOUS BLD VENIPUNCTURE: CPT

## 2019-02-13 PROCEDURE — 96365 THER/PROPH/DIAG IV INF INIT: CPT

## 2019-02-13 PROCEDURE — 81001 URINALYSIS AUTO W/SCOPE: CPT

## 2019-02-13 PROCEDURE — 6370000000 HC RX 637 (ALT 250 FOR IP): Performed by: NURSE PRACTITIONER

## 2019-02-13 PROCEDURE — 96375 TX/PRO/DX INJ NEW DRUG ADDON: CPT

## 2019-02-13 PROCEDURE — 71046 X-RAY EXAM CHEST 2 VIEWS: CPT

## 2019-02-13 PROCEDURE — 72125 CT NECK SPINE W/O DYE: CPT

## 2019-02-13 PROCEDURE — 83605 ASSAY OF LACTIC ACID: CPT

## 2019-02-13 PROCEDURE — 85025 COMPLETE CBC W/AUTO DIFF WBC: CPT

## 2019-02-13 PROCEDURE — 82805 BLOOD GASES W/O2 SATURATION: CPT

## 2019-02-13 PROCEDURE — 87040 BLOOD CULTURE FOR BACTERIA: CPT

## 2019-02-13 PROCEDURE — 71250 CT THORAX DX C-: CPT

## 2019-02-13 PROCEDURE — 87205 SMEAR GRAM STAIN: CPT

## 2019-02-13 PROCEDURE — 2580000003 HC RX 258: Performed by: NURSE PRACTITIONER

## 2019-02-13 PROCEDURE — 73030 X-RAY EXAM OF SHOULDER: CPT

## 2019-02-13 PROCEDURE — 85610 PROTHROMBIN TIME: CPT

## 2019-02-13 PROCEDURE — 87073 CULTURE BACTERIA ANAEROBIC: CPT

## 2019-02-13 PROCEDURE — 1200000000 HC SEMI PRIVATE

## 2019-02-13 RX ORDER — CLOPIDOGREL BISULFATE 75 MG/1
75 TABLET ORAL DAILY
Status: DISCONTINUED | OUTPATIENT
Start: 2019-02-14 | End: 2019-02-13

## 2019-02-13 RX ORDER — GABAPENTIN 300 MG/1
300 CAPSULE ORAL 3 TIMES DAILY
Status: DISCONTINUED | OUTPATIENT
Start: 2019-02-13 | End: 2019-02-19 | Stop reason: HOSPADM

## 2019-02-13 RX ORDER — MELOXICAM 7.5 MG/1
15 TABLET ORAL DAILY
Status: DISCONTINUED | OUTPATIENT
Start: 2019-02-14 | End: 2019-02-13

## 2019-02-13 RX ORDER — AMLODIPINE BESYLATE 5 MG/1
5 TABLET ORAL DAILY
Status: DISCONTINUED | OUTPATIENT
Start: 2019-02-14 | End: 2019-02-13

## 2019-02-13 RX ORDER — ATORVASTATIN CALCIUM 10 MG/1
10 TABLET, FILM COATED ORAL DAILY
Status: DISCONTINUED | OUTPATIENT
Start: 2019-02-14 | End: 2019-02-19 | Stop reason: HOSPADM

## 2019-02-13 RX ORDER — POLYETHYLENE GLYCOL 3350 17 G/17G
17 POWDER, FOR SOLUTION ORAL DAILY
Status: DISCONTINUED | OUTPATIENT
Start: 2019-02-14 | End: 2019-02-19 | Stop reason: HOSPADM

## 2019-02-13 RX ORDER — SODIUM CHLORIDE 0.9 % (FLUSH) 0.9 %
10 SYRINGE (ML) INJECTION PRN
Status: DISCONTINUED | OUTPATIENT
Start: 2019-02-13 | End: 2019-02-19 | Stop reason: HOSPADM

## 2019-02-13 RX ORDER — M-VIT,TX,IRON,MINS/CALC/FOLIC 27MG-0.4MG
1 TABLET ORAL DAILY
Status: DISCONTINUED | OUTPATIENT
Start: 2019-02-14 | End: 2019-02-19 | Stop reason: HOSPADM

## 2019-02-13 RX ORDER — TRAMADOL HYDROCHLORIDE 50 MG/1
50 TABLET ORAL 2 TIMES DAILY PRN
Status: DISCONTINUED | OUTPATIENT
Start: 2019-02-13 | End: 2019-02-19 | Stop reason: HOSPADM

## 2019-02-13 RX ORDER — HYDROCHLOROTHIAZIDE 12.5 MG/1
12.5 TABLET ORAL DAILY
Status: DISCONTINUED | OUTPATIENT
Start: 2019-02-14 | End: 2019-02-13

## 2019-02-13 RX ORDER — ONDANSETRON 2 MG/ML
4 INJECTION INTRAMUSCULAR; INTRAVENOUS EVERY 6 HOURS PRN
Status: DISCONTINUED | OUTPATIENT
Start: 2019-02-13 | End: 2019-02-19 | Stop reason: HOSPADM

## 2019-02-13 RX ORDER — SODIUM CHLORIDE 9 MG/ML
INJECTION, SOLUTION INTRAVENOUS CONTINUOUS
Status: DISCONTINUED | OUTPATIENT
Start: 2019-02-13 | End: 2019-02-15

## 2019-02-13 RX ORDER — DIAZEPAM 5 MG/1
10 TABLET ORAL 2 TIMES DAILY PRN
Status: DISCONTINUED | OUTPATIENT
Start: 2019-02-13 | End: 2019-02-13

## 2019-02-13 RX ORDER — ACETAMINOPHEN 500 MG
500 TABLET ORAL 2 TIMES DAILY PRN
Status: DISCONTINUED | OUTPATIENT
Start: 2019-02-13 | End: 2019-02-19 | Stop reason: HOSPADM

## 2019-02-13 RX ORDER — MORPHINE SULFATE 4 MG/ML
4 INJECTION, SOLUTION INTRAMUSCULAR; INTRAVENOUS
Status: DISCONTINUED | OUTPATIENT
Start: 2019-02-13 | End: 2019-02-19 | Stop reason: HOSPADM

## 2019-02-13 RX ORDER — LIDOCAINE 4 G/G
2 PATCH TOPICAL DAILY
Status: DISCONTINUED | OUTPATIENT
Start: 2019-02-14 | End: 2019-02-19 | Stop reason: HOSPADM

## 2019-02-13 RX ORDER — DOCUSATE SODIUM 100 MG/1
100 CAPSULE, LIQUID FILLED ORAL 2 TIMES DAILY
Status: DISCONTINUED | OUTPATIENT
Start: 2019-02-13 | End: 2019-02-19 | Stop reason: HOSPADM

## 2019-02-13 RX ORDER — NICOTINE 21 MG/24HR
1 PATCH, TRANSDERMAL 24 HOURS TRANSDERMAL DAILY
Status: DISCONTINUED | OUTPATIENT
Start: 2019-02-14 | End: 2019-02-13

## 2019-02-13 RX ORDER — SODIUM CHLORIDE 0.9 % (FLUSH) 0.9 %
10 SYRINGE (ML) INJECTION EVERY 12 HOURS SCHEDULED
Status: DISCONTINUED | OUTPATIENT
Start: 2019-02-13 | End: 2019-02-19 | Stop reason: HOSPADM

## 2019-02-13 RX ORDER — AMLODIPINE BESYLATE AND ATORVASTATIN CALCIUM 5; 10 MG/1; MG/1
1 TABLET, FILM COATED ORAL DAILY
Status: DISCONTINUED | OUTPATIENT
Start: 2019-02-14 | End: 2019-02-13 | Stop reason: CLARIF

## 2019-02-13 RX ORDER — MORPHINE SULFATE 4 MG/ML
2 INJECTION, SOLUTION INTRAMUSCULAR; INTRAVENOUS
Status: DISCONTINUED | OUTPATIENT
Start: 2019-02-13 | End: 2019-02-19 | Stop reason: HOSPADM

## 2019-02-13 RX ORDER — PANTOPRAZOLE SODIUM 40 MG/1
40 TABLET, DELAYED RELEASE ORAL
Status: DISCONTINUED | OUTPATIENT
Start: 2019-02-14 | End: 2019-02-19 | Stop reason: HOSPADM

## 2019-02-13 RX ORDER — MORPHINE SULFATE 4 MG/ML
4 INJECTION, SOLUTION INTRAMUSCULAR; INTRAVENOUS EVERY 30 MIN PRN
Status: DISCONTINUED | OUTPATIENT
Start: 2019-02-13 | End: 2019-02-13 | Stop reason: SDUPTHER

## 2019-02-13 RX ORDER — CITALOPRAM 20 MG/1
10 TABLET ORAL NIGHTLY
Status: DISCONTINUED | OUTPATIENT
Start: 2019-02-13 | End: 2019-02-19 | Stop reason: HOSPADM

## 2019-02-13 RX ADMIN — GABAPENTIN 300 MG: 300 CAPSULE ORAL at 21:46

## 2019-02-13 RX ADMIN — SODIUM CHLORIDE, PRESERVATIVE FREE 10 ML: 5 INJECTION INTRAVENOUS at 21:45

## 2019-02-13 RX ADMIN — SODIUM CHLORIDE: 9 INJECTION, SOLUTION INTRAVENOUS at 21:46

## 2019-02-13 RX ADMIN — CEFEPIME 2 G: 2 INJECTION, POWDER, FOR SOLUTION INTRAVENOUS at 19:58

## 2019-02-13 RX ADMIN — DOCUSATE SODIUM 100 MG: 100 CAPSULE, LIQUID FILLED ORAL at 21:46

## 2019-02-13 RX ADMIN — SODIUM CHLORIDE: 9 INJECTION, SOLUTION INTRAVENOUS at 19:58

## 2019-02-13 RX ADMIN — VANCOMYCIN HYDROCHLORIDE 2000 MG: 1 INJECTION, POWDER, LYOPHILIZED, FOR SOLUTION INTRAVENOUS at 21:50

## 2019-02-13 RX ADMIN — MORPHINE SULFATE 4 MG: 4 INJECTION INTRAVENOUS at 20:42

## 2019-02-13 ASSESSMENT — PAIN SCALES - GENERAL
PAINLEVEL_OUTOF10: 0
PAINLEVEL_OUTOF10: 7
PAINLEVEL_OUTOF10: 7

## 2019-02-13 ASSESSMENT — PAIN DESCRIPTION - PAIN TYPE: TYPE: ACUTE PAIN

## 2019-02-13 ASSESSMENT — PAIN DESCRIPTION - ORIENTATION: ORIENTATION: RIGHT;LEFT

## 2019-02-13 ASSESSMENT — PAIN DESCRIPTION - LOCATION: LOCATION: SHOULDER;BACK;RIB CAGE

## 2019-02-13 NOTE — PROGRESS NOTES
Attempted phone assessment- sounded like pt trying to answer twice then line goes dead - on 3rd call got voice mail- gave basic instructions for preo/post IR procedure for tomorrow

## 2019-02-14 ENCOUNTER — APPOINTMENT (OUTPATIENT)
Dept: GENERAL RADIOLOGY | Age: 69
DRG: 199 | End: 2019-02-14
Payer: MEDICARE

## 2019-02-14 ENCOUNTER — HOSPITAL ENCOUNTER (OUTPATIENT)
Dept: INTERVENTIONAL RADIOLOGY/VASCULAR | Age: 69
Discharge: HOME | End: 2019-02-14

## 2019-02-14 ENCOUNTER — APPOINTMENT (OUTPATIENT)
Dept: MRI IMAGING | Age: 69
DRG: 199 | End: 2019-02-14
Payer: MEDICARE

## 2019-02-14 PROBLEM — S46.012A ROTATOR CUFF STRAIN, LEFT, INITIAL ENCOUNTER: Status: ACTIVE | Noted: 2019-02-14

## 2019-02-14 LAB
ANION GAP SERPL CALCULATED.3IONS-SCNC: 10 MMOL/L (ref 4–16)
BASOPHILS ABSOLUTE: 0 K/CU MM
BASOPHILS RELATIVE PERCENT: 0.2 % (ref 0–1)
BUN BLDV-MCNC: 32 MG/DL (ref 6–23)
CALCIUM SERPL-MCNC: 8.4 MG/DL (ref 8.3–10.6)
CHLORIDE BLD-SCNC: 94 MMOL/L (ref 99–110)
CO2: 32 MMOL/L (ref 21–32)
CREAT SERPL-MCNC: 0.9 MG/DL (ref 0.9–1.3)
DIFFERENTIAL TYPE: ABNORMAL
EOSINOPHILS ABSOLUTE: 0 K/CU MM
EOSINOPHILS RELATIVE PERCENT: 0 % (ref 0–3)
GFR AFRICAN AMERICAN: >60 ML/MIN/1.73M2
GFR NON-AFRICAN AMERICAN: >60 ML/MIN/1.73M2
GLUCOSE BLD-MCNC: 170 MG/DL (ref 70–99)
HCT VFR BLD CALC: 40.2 % (ref 42–52)
HEMOGLOBIN: 12.5 GM/DL (ref 13.5–18)
IMMATURE NEUTROPHIL %: 0.5 % (ref 0–0.43)
LYMPHOCYTES ABSOLUTE: 0.6 K/CU MM
LYMPHOCYTES RELATIVE PERCENT: 5 % (ref 24–44)
MCH RBC QN AUTO: 30 PG (ref 27–31)
MCHC RBC AUTO-ENTMCNC: 31.1 % (ref 32–36)
MCV RBC AUTO: 96.4 FL (ref 78–100)
MONOCYTES ABSOLUTE: 1.7 K/CU MM
MONOCYTES RELATIVE PERCENT: 13.3 % (ref 0–4)
PDW BLD-RTO: 13.6 % (ref 11.7–14.9)
PLATELET # BLD: 267 K/CU MM (ref 140–440)
PMV BLD AUTO: 9.7 FL (ref 7.5–11.1)
POLYCHROMASIA: ABNORMAL
POTASSIUM SERPL-SCNC: 3.6 MMOL/L (ref 3.5–5.1)
PROCALCITONIN: 1.5
RBC # BLD: 4.17 M/CU MM (ref 4.6–6.2)
SEGMENTED NEUTROPHILS ABSOLUTE COUNT: 10.1 K/CU MM
SEGMENTED NEUTROPHILS RELATIVE PERCENT: 81 % (ref 36–66)
SODIUM BLD-SCNC: 136 MMOL/L (ref 135–145)
TOTAL IMMATURE NEUTOROPHIL: 0.06 K/CU MM
WBC # BLD: 12.5 K/CU MM (ref 4–10.5)

## 2019-02-14 PROCEDURE — 73221 MRI JOINT UPR EXTREM W/O DYE: CPT

## 2019-02-14 PROCEDURE — 2700000000 HC OXYGEN THERAPY PER DAY

## 2019-02-14 PROCEDURE — 80048 BASIC METABOLIC PNL TOTAL CA: CPT

## 2019-02-14 PROCEDURE — 2060000000 HC ICU INTERMEDIATE R&B

## 2019-02-14 PROCEDURE — 84145 PROCALCITONIN (PCT): CPT

## 2019-02-14 PROCEDURE — 94761 N-INVAS EAR/PLS OXIMETRY MLT: CPT

## 2019-02-14 PROCEDURE — 6360000002 HC RX W HCPCS: Performed by: NURSE PRACTITIONER

## 2019-02-14 PROCEDURE — 71045 X-RAY EXAM CHEST 1 VIEW: CPT

## 2019-02-14 PROCEDURE — 2500000003 HC RX 250 WO HCPCS

## 2019-02-14 PROCEDURE — 32551 INSERTION OF CHEST TUBE: CPT

## 2019-02-14 PROCEDURE — 94640 AIRWAY INHALATION TREATMENT: CPT

## 2019-02-14 PROCEDURE — 97166 OT EVAL MOD COMPLEX 45 MIN: CPT

## 2019-02-14 PROCEDURE — 36415 COLL VENOUS BLD VENIPUNCTURE: CPT

## 2019-02-14 PROCEDURE — 2580000003 HC RX 258: Performed by: FAMILY MEDICINE

## 2019-02-14 PROCEDURE — 2580000003 HC RX 258: Performed by: NURSE PRACTITIONER

## 2019-02-14 PROCEDURE — 97161 PT EVAL LOW COMPLEX 20 MIN: CPT

## 2019-02-14 PROCEDURE — 6360000002 HC RX W HCPCS: Performed by: FAMILY MEDICINE

## 2019-02-14 PROCEDURE — 97530 THERAPEUTIC ACTIVITIES: CPT

## 2019-02-14 PROCEDURE — 6370000000 HC RX 637 (ALT 250 FOR IP): Performed by: FAMILY MEDICINE

## 2019-02-14 PROCEDURE — 85025 COMPLETE CBC W/AUTO DIFF WBC: CPT

## 2019-02-14 PROCEDURE — 6370000000 HC RX 637 (ALT 250 FOR IP): Performed by: NURSE PRACTITIONER

## 2019-02-14 PROCEDURE — 0W9930Z DRAINAGE OF RIGHT PLEURAL CAVITY WITH DRAINAGE DEVICE, PERCUTANEOUS APPROACH: ICD-10-PCS | Performed by: SURGERY

## 2019-02-14 PROCEDURE — 6360000002 HC RX W HCPCS: Performed by: SURGERY

## 2019-02-14 PROCEDURE — 99223 1ST HOSP IP/OBS HIGH 75: CPT | Performed by: INTERNAL MEDICINE

## 2019-02-14 RX ORDER — MORPHINE SULFATE 4 MG/ML
3 INJECTION, SOLUTION INTRAMUSCULAR; INTRAVENOUS ONCE
Status: COMPLETED | OUTPATIENT
Start: 2019-02-14 | End: 2019-02-14

## 2019-02-14 RX ORDER — IPRATROPIUM BROMIDE AND ALBUTEROL SULFATE 2.5; .5 MG/3ML; MG/3ML
1 SOLUTION RESPIRATORY (INHALATION)
Status: DISCONTINUED | OUTPATIENT
Start: 2019-02-14 | End: 2019-02-16

## 2019-02-14 RX ORDER — KETOROLAC TROMETHAMINE 30 MG/ML
30 INJECTION, SOLUTION INTRAMUSCULAR; INTRAVENOUS EVERY 6 HOURS PRN
Status: ACTIVE | OUTPATIENT
Start: 2019-02-14 | End: 2019-02-19

## 2019-02-14 RX ORDER — MORPHINE SULFATE 4 MG/ML
5 INJECTION, SOLUTION INTRAMUSCULAR; INTRAVENOUS ONCE
Status: DISCONTINUED | OUTPATIENT
Start: 2019-02-14 | End: 2019-02-14

## 2019-02-14 RX ORDER — LIDOCAINE HYDROCHLORIDE 10 MG/ML
INJECTION, SOLUTION INFILTRATION; PERINEURAL
Status: COMPLETED
Start: 2019-02-14 | End: 2019-02-14

## 2019-02-14 RX ORDER — MORPHINE SULFATE 4 MG/ML
INJECTION, SOLUTION INTRAMUSCULAR; INTRAVENOUS
Status: DISPENSED
Start: 2019-02-14 | End: 2019-02-15

## 2019-02-14 RX ADMIN — IPRATROPIUM BROMIDE AND ALBUTEROL SULFATE 1 AMPULE: .5; 3 SOLUTION RESPIRATORY (INHALATION) at 15:56

## 2019-02-14 RX ADMIN — MORPHINE SULFATE 4 MG: 4 INJECTION INTRAVENOUS at 08:58

## 2019-02-14 RX ADMIN — MORPHINE SULFATE 3 MG: 4 INJECTION INTRAVENOUS at 12:20

## 2019-02-14 RX ADMIN — CEFTRIAXONE 1 G: 1 INJECTION, POWDER, FOR SOLUTION INTRAMUSCULAR; INTRAVENOUS at 11:32

## 2019-02-14 RX ADMIN — GABAPENTIN 300 MG: 300 CAPSULE ORAL at 20:49

## 2019-02-14 RX ADMIN — IPRATROPIUM BROMIDE AND ALBUTEROL SULFATE 1 AMPULE: .5; 3 SOLUTION RESPIRATORY (INHALATION) at 20:49

## 2019-02-14 RX ADMIN — SODIUM CHLORIDE: 9 INJECTION, SOLUTION INTRAVENOUS at 18:04

## 2019-02-14 RX ADMIN — GABAPENTIN 300 MG: 300 CAPSULE ORAL at 15:28

## 2019-02-14 RX ADMIN — LIDOCAINE HYDROCHLORIDE 200 MG: 10 INJECTION, SOLUTION INFILTRATION; PERINEURAL at 14:18

## 2019-02-14 RX ADMIN — SODIUM CHLORIDE, PRESERVATIVE FREE 10 ML: 5 INJECTION INTRAVENOUS at 21:20

## 2019-02-14 RX ADMIN — CEFEPIME 2 G: 2 INJECTION, POWDER, FOR SOLUTION INTRAVENOUS at 08:44

## 2019-02-14 RX ADMIN — DOCUSATE SODIUM 100 MG: 100 CAPSULE, LIQUID FILLED ORAL at 20:49

## 2019-02-14 RX ADMIN — AZITHROMYCIN MONOHYDRATE 500 MG: 500 INJECTION, POWDER, LYOPHILIZED, FOR SOLUTION INTRAVENOUS at 14:18

## 2019-02-14 RX ADMIN — CITALOPRAM HYDROBROMIDE 10 MG: 20 TABLET ORAL at 20:49

## 2019-02-14 ASSESSMENT — PAIN DESCRIPTION - ORIENTATION: ORIENTATION: LEFT

## 2019-02-14 ASSESSMENT — PAIN DESCRIPTION - LOCATION: LOCATION: SHOULDER

## 2019-02-14 ASSESSMENT — PAIN DESCRIPTION - PAIN TYPE: TYPE: ACUTE PAIN

## 2019-02-14 ASSESSMENT — PAIN SCALES - GENERAL
PAINLEVEL_OUTOF10: 7
PAINLEVEL_OUTOF10: 7
PAINLEVEL_OUTOF10: 0
PAINLEVEL_OUTOF10: 0
PAINLEVEL_OUTOF10: 7
PAINLEVEL_OUTOF10: 0

## 2019-02-15 ENCOUNTER — APPOINTMENT (OUTPATIENT)
Dept: GENERAL RADIOLOGY | Age: 69
DRG: 199 | End: 2019-02-15
Payer: MEDICARE
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
## 2019-02-15 LAB
AMMONIA: 31 UMOL/L (ref 16–60)
EKG ATRIAL RATE: 102 BPM
EKG ATRIAL RATE: 98 BPM
EKG DIAGNOSIS: NORMAL
EKG DIAGNOSIS: NORMAL
EKG P AXIS: 116 DEGREES
EKG P AXIS: 34 DEGREES
EKG P-R INTERVAL: 124 MS
EKG P-R INTERVAL: 126 MS
EKG Q-T INTERVAL: 340 MS
EKG Q-T INTERVAL: 344 MS
EKG QRS DURATION: 94 MS
EKG QRS DURATION: 96 MS
EKG QTC CALCULATION (BAZETT): 434 MS
EKG QTC CALCULATION (BAZETT): 448 MS
EKG R AXIS: -18 DEGREES
EKG R AXIS: -2 DEGREES
EKG T AXIS: 16 DEGREES
EKG T AXIS: 83 DEGREES
EKG VENTRICULAR RATE: 102 BPM
EKG VENTRICULAR RATE: 98 BPM
GLUCOSE BLD-MCNC: 154 MG/DL (ref 70–99)
HCT VFR BLD CALC: 39.2 % (ref 42–52)
HEMOGLOBIN: 12.4 GM/DL (ref 13.5–18)
MCH RBC QN AUTO: 30.3 PG (ref 27–31)
MCHC RBC AUTO-ENTMCNC: 31.6 % (ref 32–36)
MCV RBC AUTO: 95.8 FL (ref 78–100)
PDW BLD-RTO: 13.5 % (ref 11.7–14.9)
PLATELET # BLD: 248 K/CU MM (ref 140–440)
PMV BLD AUTO: 9.5 FL (ref 7.5–11.1)
RBC # BLD: 4.09 M/CU MM (ref 4.6–6.2)
REASON FOR REJECTION: NORMAL
REJECTED TEST: NORMAL
SOURCE: NORMAL
WBC # BLD: 10.8 K/CU MM (ref 4–10.5)

## 2019-02-15 PROCEDURE — 2580000003 HC RX 258: Performed by: EMERGENCY MEDICINE

## 2019-02-15 PROCEDURE — 93010 ELECTROCARDIOGRAM REPORT: CPT | Performed by: INTERNAL MEDICINE

## 2019-02-15 PROCEDURE — 82962 GLUCOSE BLOOD TEST: CPT

## 2019-02-15 PROCEDURE — 71045 X-RAY EXAM CHEST 1 VIEW: CPT

## 2019-02-15 PROCEDURE — 99232 SBSQ HOSP IP/OBS MODERATE 35: CPT | Performed by: INTERNAL MEDICINE

## 2019-02-15 PROCEDURE — 85027 COMPLETE CBC AUTOMATED: CPT

## 2019-02-15 PROCEDURE — 97110 THERAPEUTIC EXERCISES: CPT

## 2019-02-15 PROCEDURE — 6360000002 HC RX W HCPCS: Performed by: NURSE PRACTITIONER

## 2019-02-15 PROCEDURE — 94150 VITAL CAPACITY TEST: CPT

## 2019-02-15 PROCEDURE — 6370000000 HC RX 637 (ALT 250 FOR IP): Performed by: NURSE PRACTITIONER

## 2019-02-15 PROCEDURE — 2580000003 HC RX 258: Performed by: NURSE PRACTITIONER

## 2019-02-15 PROCEDURE — 82140 ASSAY OF AMMONIA: CPT

## 2019-02-15 PROCEDURE — 6360000002 HC RX W HCPCS: Performed by: FAMILY MEDICINE

## 2019-02-15 PROCEDURE — 94762 N-INVAS EAR/PLS OXIMTRY CONT: CPT

## 2019-02-15 PROCEDURE — 94761 N-INVAS EAR/PLS OXIMETRY MLT: CPT

## 2019-02-15 PROCEDURE — 94640 AIRWAY INHALATION TREATMENT: CPT

## 2019-02-15 PROCEDURE — 2700000000 HC OXYGEN THERAPY PER DAY

## 2019-02-15 PROCEDURE — 2060000000 HC ICU INTERMEDIATE R&B

## 2019-02-15 PROCEDURE — 36415 COLL VENOUS BLD VENIPUNCTURE: CPT

## 2019-02-15 PROCEDURE — 6370000000 HC RX 637 (ALT 250 FOR IP): Performed by: FAMILY MEDICINE

## 2019-02-15 PROCEDURE — 2580000003 HC RX 258: Performed by: FAMILY MEDICINE

## 2019-02-15 RX ADMIN — GABAPENTIN 300 MG: 300 CAPSULE ORAL at 15:05

## 2019-02-15 RX ADMIN — PANTOPRAZOLE SODIUM 40 MG: 40 TABLET, DELAYED RELEASE ORAL at 05:09

## 2019-02-15 RX ADMIN — SODIUM CHLORIDE, PRESERVATIVE FREE 10 ML: 5 INJECTION INTRAVENOUS at 20:42

## 2019-02-15 RX ADMIN — POLYETHYLENE GLYCOL 3350 17 G: 17 POWDER, FOR SOLUTION ORAL at 09:21

## 2019-02-15 RX ADMIN — IPRATROPIUM BROMIDE AND ALBUTEROL SULFATE 1 AMPULE: .5; 3 SOLUTION RESPIRATORY (INHALATION) at 15:32

## 2019-02-15 RX ADMIN — DOCUSATE SODIUM 100 MG: 100 CAPSULE, LIQUID FILLED ORAL at 09:20

## 2019-02-15 RX ADMIN — SODIUM CHLORIDE: 9 INJECTION, SOLUTION INTRAVENOUS at 04:20

## 2019-02-15 RX ADMIN — CEFTRIAXONE 1 G: 1 INJECTION, POWDER, FOR SOLUTION INTRAMUSCULAR; INTRAVENOUS at 12:27

## 2019-02-15 RX ADMIN — GABAPENTIN 300 MG: 300 CAPSULE ORAL at 09:20

## 2019-02-15 RX ADMIN — MULTIPLE VITAMINS W/ MINERALS TAB 1 TABLET: TAB at 09:20

## 2019-02-15 RX ADMIN — DOCUSATE SODIUM 100 MG: 100 CAPSULE, LIQUID FILLED ORAL at 20:41

## 2019-02-15 RX ADMIN — SODIUM CHLORIDE, PRESERVATIVE FREE 10 ML: 5 INJECTION INTRAVENOUS at 09:20

## 2019-02-15 RX ADMIN — ATORVASTATIN CALCIUM 10 MG: 10 TABLET, FILM COATED ORAL at 09:20

## 2019-02-15 RX ADMIN — ENOXAPARIN SODIUM 40 MG: 40 INJECTION SUBCUTANEOUS at 09:20

## 2019-02-15 RX ADMIN — AZITHROMYCIN MONOHYDRATE 500 MG: 500 INJECTION, POWDER, LYOPHILIZED, FOR SOLUTION INTRAVENOUS at 13:09

## 2019-02-15 RX ADMIN — IPRATROPIUM BROMIDE AND ALBUTEROL SULFATE 1 AMPULE: .5; 3 SOLUTION RESPIRATORY (INHALATION) at 11:35

## 2019-02-15 RX ADMIN — GABAPENTIN 300 MG: 300 CAPSULE ORAL at 20:41

## 2019-02-15 RX ADMIN — IPRATROPIUM BROMIDE AND ALBUTEROL SULFATE 1 AMPULE: .5; 3 SOLUTION RESPIRATORY (INHALATION) at 21:05

## 2019-02-15 RX ADMIN — IPRATROPIUM BROMIDE AND ALBUTEROL SULFATE 1 AMPULE: .5; 3 SOLUTION RESPIRATORY (INHALATION) at 08:45

## 2019-02-15 ASSESSMENT — PAIN SCALES - GENERAL
PAINLEVEL_OUTOF10: 0

## 2019-02-16 ENCOUNTER — APPOINTMENT (OUTPATIENT)
Dept: GENERAL RADIOLOGY | Age: 69
DRG: 199 | End: 2019-02-16
Payer: MEDICARE

## 2019-02-16 LAB
CULTURE: NORMAL
Lab: NORMAL
REPORT STATUS: NORMAL
SPECIMEN: NORMAL
TOTAL COLONY COUNT: NORMAL

## 2019-02-16 PROCEDURE — 2580000003 HC RX 258: Performed by: FAMILY MEDICINE

## 2019-02-16 PROCEDURE — 6360000002 HC RX W HCPCS: Performed by: FAMILY MEDICINE

## 2019-02-16 PROCEDURE — 2700000000 HC OXYGEN THERAPY PER DAY

## 2019-02-16 PROCEDURE — 71045 X-RAY EXAM CHEST 1 VIEW: CPT

## 2019-02-16 PROCEDURE — 94640 AIRWAY INHALATION TREATMENT: CPT

## 2019-02-16 PROCEDURE — 94761 N-INVAS EAR/PLS OXIMETRY MLT: CPT

## 2019-02-16 PROCEDURE — 6360000002 HC RX W HCPCS: Performed by: INTERNAL MEDICINE

## 2019-02-16 PROCEDURE — 2580000003 HC RX 258: Performed by: INTERNAL MEDICINE

## 2019-02-16 PROCEDURE — 90670 PCV13 VACCINE IM: CPT | Performed by: INTERNAL MEDICINE

## 2019-02-16 PROCEDURE — 2060000000 HC ICU INTERMEDIATE R&B

## 2019-02-16 PROCEDURE — 99232 SBSQ HOSP IP/OBS MODERATE 35: CPT | Performed by: INTERNAL MEDICINE

## 2019-02-16 PROCEDURE — 6370000000 HC RX 637 (ALT 250 FOR IP): Performed by: FAMILY MEDICINE

## 2019-02-16 PROCEDURE — 94664 DEMO&/EVAL PT USE INHALER: CPT

## 2019-02-16 PROCEDURE — 2580000003 HC RX 258: Performed by: NURSE PRACTITIONER

## 2019-02-16 PROCEDURE — 6370000000 HC RX 637 (ALT 250 FOR IP): Performed by: INTERNAL MEDICINE

## 2019-02-16 PROCEDURE — 94150 VITAL CAPACITY TEST: CPT

## 2019-02-16 PROCEDURE — 94660 CPAP INITIATION&MGMT: CPT

## 2019-02-16 PROCEDURE — G0009 ADMIN PNEUMOCOCCAL VACCINE: HCPCS | Performed by: INTERNAL MEDICINE

## 2019-02-16 PROCEDURE — 6370000000 HC RX 637 (ALT 250 FOR IP): Performed by: NURSE PRACTITIONER

## 2019-02-16 PROCEDURE — 6360000002 HC RX W HCPCS: Performed by: NURSE PRACTITIONER

## 2019-02-16 RX ORDER — IPRATROPIUM BROMIDE AND ALBUTEROL SULFATE 2.5; .5 MG/3ML; MG/3ML
1 SOLUTION RESPIRATORY (INHALATION) EVERY 6 HOURS
Status: DISCONTINUED | OUTPATIENT
Start: 2019-02-16 | End: 2019-02-17

## 2019-02-16 RX ORDER — IPRATROPIUM BROMIDE AND ALBUTEROL SULFATE 2.5; .5 MG/3ML; MG/3ML
1 SOLUTION RESPIRATORY (INHALATION) 4 TIMES DAILY
Status: DISCONTINUED | OUTPATIENT
Start: 2019-02-16 | End: 2019-02-16

## 2019-02-16 RX ADMIN — SODIUM CHLORIDE, PRESERVATIVE FREE 10 ML: 5 INJECTION INTRAVENOUS at 20:39

## 2019-02-16 RX ADMIN — DOCUSATE SODIUM 100 MG: 100 CAPSULE, LIQUID FILLED ORAL at 08:58

## 2019-02-16 RX ADMIN — PNEUMOCOCCAL 13-VALENT CONJUGATE VACCINE 0.5 ML: 2.2; 2.2; 2.2; 2.2; 2.2; 4.4; 2.2; 2.2; 2.2; 2.2; 2.2; 2.2; 2.2 INJECTION, SUSPENSION INTRAMUSCULAR at 17:05

## 2019-02-16 RX ADMIN — GABAPENTIN 300 MG: 300 CAPSULE ORAL at 14:12

## 2019-02-16 RX ADMIN — POLYETHYLENE GLYCOL 3350 17 G: 17 POWDER, FOR SOLUTION ORAL at 08:59

## 2019-02-16 RX ADMIN — IPRATROPIUM BROMIDE AND ALBUTEROL SULFATE 1 AMPULE: .5; 3 SOLUTION RESPIRATORY (INHALATION) at 08:37

## 2019-02-16 RX ADMIN — CITALOPRAM HYDROBROMIDE 10 MG: 20 TABLET ORAL at 20:38

## 2019-02-16 RX ADMIN — CEFTRIAXONE 1 G: 1 INJECTION, POWDER, FOR SOLUTION INTRAMUSCULAR; INTRAVENOUS at 11:40

## 2019-02-16 RX ADMIN — GABAPENTIN 300 MG: 300 CAPSULE ORAL at 08:58

## 2019-02-16 RX ADMIN — ENOXAPARIN SODIUM 40 MG: 40 INJECTION SUBCUTANEOUS at 08:57

## 2019-02-16 RX ADMIN — GABAPENTIN 300 MG: 300 CAPSULE ORAL at 20:38

## 2019-02-16 RX ADMIN — DOCUSATE SODIUM 100 MG: 100 CAPSULE, LIQUID FILLED ORAL at 20:38

## 2019-02-16 RX ADMIN — PANTOPRAZOLE SODIUM 40 MG: 40 TABLET, DELAYED RELEASE ORAL at 06:28

## 2019-02-16 RX ADMIN — IPRATROPIUM BROMIDE AND ALBUTEROL SULFATE 1 AMPULE: .5; 3 SOLUTION RESPIRATORY (INHALATION) at 19:39

## 2019-02-16 RX ADMIN — TRAMADOL HYDROCHLORIDE 50 MG: 50 TABLET, FILM COATED ORAL at 22:30

## 2019-02-16 RX ADMIN — AZITHROMYCIN MONOHYDRATE 250 MG: 500 INJECTION, POWDER, LYOPHILIZED, FOR SOLUTION INTRAVENOUS at 14:08

## 2019-02-16 RX ADMIN — MULTIPLE VITAMINS W/ MINERALS TAB 1 TABLET: TAB at 08:59

## 2019-02-16 RX ADMIN — IPRATROPIUM BROMIDE AND ALBUTEROL SULFATE 1 AMPULE: .5; 3 SOLUTION RESPIRATORY (INHALATION) at 12:07

## 2019-02-16 RX ADMIN — ATORVASTATIN CALCIUM 10 MG: 10 TABLET, FILM COATED ORAL at 08:58

## 2019-02-16 RX ADMIN — SODIUM CHLORIDE, PRESERVATIVE FREE 10 ML: 5 INJECTION INTRAVENOUS at 14:13

## 2019-02-16 ASSESSMENT — PAIN SCALES - GENERAL: PAINLEVEL_OUTOF10: 7

## 2019-02-16 ASSESSMENT — PAIN DESCRIPTION - PAIN TYPE: TYPE: ACUTE PAIN

## 2019-02-16 ASSESSMENT — PAIN DESCRIPTION - DESCRIPTORS: DESCRIPTORS: ACHING

## 2019-02-16 ASSESSMENT — PAIN DESCRIPTION - ORIENTATION: ORIENTATION: LEFT

## 2019-02-16 ASSESSMENT — PAIN DESCRIPTION - LOCATION: LOCATION: SHOULDER

## 2019-02-17 PROCEDURE — 6360000002 HC RX W HCPCS: Performed by: NURSE PRACTITIONER

## 2019-02-17 PROCEDURE — 94660 CPAP INITIATION&MGMT: CPT

## 2019-02-17 PROCEDURE — 94640 AIRWAY INHALATION TREATMENT: CPT

## 2019-02-17 PROCEDURE — 2580000003 HC RX 258: Performed by: FAMILY MEDICINE

## 2019-02-17 PROCEDURE — 94150 VITAL CAPACITY TEST: CPT

## 2019-02-17 PROCEDURE — 6370000000 HC RX 637 (ALT 250 FOR IP): Performed by: INTERNAL MEDICINE

## 2019-02-17 PROCEDURE — 99232 SBSQ HOSP IP/OBS MODERATE 35: CPT | Performed by: INTERNAL MEDICINE

## 2019-02-17 PROCEDURE — 2060000000 HC ICU INTERMEDIATE R&B

## 2019-02-17 PROCEDURE — 94761 N-INVAS EAR/PLS OXIMETRY MLT: CPT

## 2019-02-17 PROCEDURE — 6370000000 HC RX 637 (ALT 250 FOR IP): Performed by: NURSE PRACTITIONER

## 2019-02-17 PROCEDURE — 6360000002 HC RX W HCPCS: Performed by: FAMILY MEDICINE

## 2019-02-17 PROCEDURE — 2580000003 HC RX 258: Performed by: NURSE PRACTITIONER

## 2019-02-17 RX ORDER — AMOXICILLIN AND CLAVULANATE POTASSIUM 875; 125 MG/1; MG/1
1 TABLET, FILM COATED ORAL EVERY 12 HOURS SCHEDULED
Status: DISCONTINUED | OUTPATIENT
Start: 2019-02-17 | End: 2019-02-19 | Stop reason: HOSPADM

## 2019-02-17 RX ORDER — IPRATROPIUM BROMIDE AND ALBUTEROL SULFATE 2.5; .5 MG/3ML; MG/3ML
1 SOLUTION RESPIRATORY (INHALATION)
Status: DISCONTINUED | OUTPATIENT
Start: 2019-02-17 | End: 2019-02-19 | Stop reason: HOSPADM

## 2019-02-17 RX ADMIN — AMOXICILLIN AND CLAVULANATE POTASSIUM 1 TABLET: 875; 125 TABLET, FILM COATED ORAL at 12:38

## 2019-02-17 RX ADMIN — POLYETHYLENE GLYCOL 3350 17 G: 17 POWDER, FOR SOLUTION ORAL at 09:27

## 2019-02-17 RX ADMIN — DOCUSATE SODIUM 100 MG: 100 CAPSULE, LIQUID FILLED ORAL at 09:26

## 2019-02-17 RX ADMIN — ATORVASTATIN CALCIUM 10 MG: 10 TABLET, FILM COATED ORAL at 09:26

## 2019-02-17 RX ADMIN — TRAMADOL HYDROCHLORIDE 50 MG: 50 TABLET, FILM COATED ORAL at 16:14

## 2019-02-17 RX ADMIN — IPRATROPIUM BROMIDE AND ALBUTEROL SULFATE 1 AMPULE: .5; 3 SOLUTION RESPIRATORY (INHALATION) at 15:06

## 2019-02-17 RX ADMIN — ENOXAPARIN SODIUM 40 MG: 40 INJECTION SUBCUTANEOUS at 09:26

## 2019-02-17 RX ADMIN — CITALOPRAM HYDROBROMIDE 10 MG: 20 TABLET ORAL at 21:32

## 2019-02-17 RX ADMIN — AMOXICILLIN AND CLAVULANATE POTASSIUM 1 TABLET: 875; 125 TABLET, FILM COATED ORAL at 21:31

## 2019-02-17 RX ADMIN — IPRATROPIUM BROMIDE AND ALBUTEROL SULFATE 1 AMPULE: .5; 3 SOLUTION RESPIRATORY (INHALATION) at 20:34

## 2019-02-17 RX ADMIN — IPRATROPIUM BROMIDE AND ALBUTEROL SULFATE 1 AMPULE: .5; 3 SOLUTION RESPIRATORY (INHALATION) at 08:11

## 2019-02-17 RX ADMIN — CEFTRIAXONE 1 G: 1 INJECTION, POWDER, FOR SOLUTION INTRAMUSCULAR; INTRAVENOUS at 09:26

## 2019-02-17 RX ADMIN — GABAPENTIN 300 MG: 300 CAPSULE ORAL at 09:26

## 2019-02-17 RX ADMIN — MULTIPLE VITAMINS W/ MINERALS TAB 1 TABLET: TAB at 09:26

## 2019-02-17 RX ADMIN — GABAPENTIN 300 MG: 300 CAPSULE ORAL at 21:31

## 2019-02-17 RX ADMIN — SODIUM CHLORIDE, PRESERVATIVE FREE 10 ML: 5 INJECTION INTRAVENOUS at 21:32

## 2019-02-17 RX ADMIN — GABAPENTIN 300 MG: 300 CAPSULE ORAL at 16:14

## 2019-02-17 RX ADMIN — SODIUM CHLORIDE, PRESERVATIVE FREE 10 ML: 5 INJECTION INTRAVENOUS at 09:27

## 2019-02-17 RX ADMIN — PANTOPRAZOLE SODIUM 40 MG: 40 TABLET, DELAYED RELEASE ORAL at 06:06

## 2019-02-17 ASSESSMENT — PAIN SCALES - GENERAL
PAINLEVEL_OUTOF10: 6
PAINLEVEL_OUTOF10: 0
PAINLEVEL_OUTOF10: 2
PAINLEVEL_OUTOF10: 0

## 2019-02-17 ASSESSMENT — PAIN DESCRIPTION - LOCATION: LOCATION: BACK

## 2019-02-17 ASSESSMENT — PAIN DESCRIPTION - FREQUENCY: FREQUENCY: INTERMITTENT

## 2019-02-17 ASSESSMENT — PAIN DESCRIPTION - ONSET: ONSET: GRADUAL

## 2019-02-17 ASSESSMENT — PAIN DESCRIPTION - PAIN TYPE: TYPE: ACUTE PAIN

## 2019-02-17 ASSESSMENT — PAIN - FUNCTIONAL ASSESSMENT: PAIN_FUNCTIONAL_ASSESSMENT: PREVENTS OR INTERFERES SOME ACTIVE ACTIVITIES AND ADLS

## 2019-02-17 ASSESSMENT — PAIN DESCRIPTION - DESCRIPTORS: DESCRIPTORS: ACHING;DISCOMFORT

## 2019-02-18 ENCOUNTER — APPOINTMENT (OUTPATIENT)
Dept: GENERAL RADIOLOGY | Age: 69
DRG: 199 | End: 2019-02-18
Payer: MEDICARE

## 2019-02-18 LAB
CULTURE: NORMAL
CULTURE: NORMAL
LV EF: 53 %
LVEF MODALITY: NORMAL
Lab: NORMAL
Lab: NORMAL
REPORT STATUS: NORMAL
REPORT STATUS: NORMAL
SPECIMEN: NORMAL
SPECIMEN: NORMAL

## 2019-02-18 PROCEDURE — 99222 1ST HOSP IP/OBS MODERATE 55: CPT | Performed by: INTERNAL MEDICINE

## 2019-02-18 PROCEDURE — 2580000003 HC RX 258: Performed by: NURSE PRACTITIONER

## 2019-02-18 PROCEDURE — 94761 N-INVAS EAR/PLS OXIMETRY MLT: CPT

## 2019-02-18 PROCEDURE — 94640 AIRWAY INHALATION TREATMENT: CPT

## 2019-02-18 PROCEDURE — 6360000002 HC RX W HCPCS: Performed by: NURSE PRACTITIONER

## 2019-02-18 PROCEDURE — 71045 X-RAY EXAM CHEST 1 VIEW: CPT

## 2019-02-18 PROCEDURE — 97116 GAIT TRAINING THERAPY: CPT

## 2019-02-18 PROCEDURE — 6370000000 HC RX 637 (ALT 250 FOR IP): Performed by: NURSE PRACTITIONER

## 2019-02-18 PROCEDURE — 6370000000 HC RX 637 (ALT 250 FOR IP): Performed by: INTERNAL MEDICINE

## 2019-02-18 PROCEDURE — 93306 TTE W/DOPPLER COMPLETE: CPT

## 2019-02-18 PROCEDURE — 2060000000 HC ICU INTERMEDIATE R&B

## 2019-02-18 PROCEDURE — 99232 SBSQ HOSP IP/OBS MODERATE 35: CPT | Performed by: INTERNAL MEDICINE

## 2019-02-18 RX ADMIN — DOCUSATE SODIUM 100 MG: 100 CAPSULE, LIQUID FILLED ORAL at 20:17

## 2019-02-18 RX ADMIN — GABAPENTIN 300 MG: 300 CAPSULE ORAL at 13:48

## 2019-02-18 RX ADMIN — GABAPENTIN 300 MG: 300 CAPSULE ORAL at 09:32

## 2019-02-18 RX ADMIN — PANTOPRAZOLE SODIUM 40 MG: 40 TABLET, DELAYED RELEASE ORAL at 06:32

## 2019-02-18 RX ADMIN — GABAPENTIN 300 MG: 300 CAPSULE ORAL at 20:18

## 2019-02-18 RX ADMIN — TRAMADOL HYDROCHLORIDE 50 MG: 50 TABLET, FILM COATED ORAL at 20:17

## 2019-02-18 RX ADMIN — SODIUM CHLORIDE, PRESERVATIVE FREE 10 ML: 5 INJECTION INTRAVENOUS at 08:43

## 2019-02-18 RX ADMIN — MULTIPLE VITAMINS W/ MINERALS TAB 1 TABLET: TAB at 08:38

## 2019-02-18 RX ADMIN — SODIUM CHLORIDE, PRESERVATIVE FREE 10 ML: 5 INJECTION INTRAVENOUS at 23:04

## 2019-02-18 RX ADMIN — CITALOPRAM HYDROBROMIDE 10 MG: 20 TABLET ORAL at 20:18

## 2019-02-18 RX ADMIN — ENOXAPARIN SODIUM 40 MG: 40 INJECTION SUBCUTANEOUS at 08:38

## 2019-02-18 RX ADMIN — AMOXICILLIN AND CLAVULANATE POTASSIUM 1 TABLET: 875; 125 TABLET, FILM COATED ORAL at 08:38

## 2019-02-18 RX ADMIN — AMOXICILLIN AND CLAVULANATE POTASSIUM 1 TABLET: 875; 125 TABLET, FILM COATED ORAL at 20:17

## 2019-02-18 RX ADMIN — IPRATROPIUM BROMIDE AND ALBUTEROL SULFATE 1 AMPULE: .5; 3 SOLUTION RESPIRATORY (INHALATION) at 22:05

## 2019-02-18 RX ADMIN — IPRATROPIUM BROMIDE AND ALBUTEROL SULFATE 1 AMPULE: .5; 3 SOLUTION RESPIRATORY (INHALATION) at 16:08

## 2019-02-18 RX ADMIN — ATORVASTATIN CALCIUM 10 MG: 10 TABLET, FILM COATED ORAL at 08:38

## 2019-02-18 ASSESSMENT — PAIN SCALES - GENERAL
PAINLEVEL_OUTOF10: 7
PAINLEVEL_OUTOF10: 7
PAINLEVEL_OUTOF10: 6
PAINLEVEL_OUTOF10: 0
PAINLEVEL_OUTOF10: 7
PAINLEVEL_OUTOF10: 3
PAINLEVEL_OUTOF10: 0
PAINLEVEL_OUTOF10: 3
PAINLEVEL_OUTOF10: 0

## 2019-02-18 ASSESSMENT — PAIN DESCRIPTION - PAIN TYPE
TYPE: CHRONIC PAIN
TYPE: CHRONIC PAIN

## 2019-02-18 ASSESSMENT — PAIN DESCRIPTION - LOCATION
LOCATION: BACK
LOCATION: SHOULDER
LOCATION: SHOULDER

## 2019-02-18 ASSESSMENT — PAIN DESCRIPTION - ORIENTATION: ORIENTATION: LEFT

## 2019-02-19 ENCOUNTER — APPOINTMENT (OUTPATIENT)
Dept: GENERAL RADIOLOGY | Age: 69
DRG: 199 | End: 2019-02-19
Payer: MEDICARE

## 2019-02-19 VITALS
BODY MASS INDEX: 27.9 KG/M2 | SYSTOLIC BLOOD PRESSURE: 128 MMHG | OXYGEN SATURATION: 95 % | HEART RATE: 75 BPM | TEMPERATURE: 98.6 F | RESPIRATION RATE: 16 BRPM | HEIGHT: 72 IN | DIASTOLIC BLOOD PRESSURE: 74 MMHG | WEIGHT: 206 LBS

## 2019-02-19 LAB — MAGNESIUM: 2.2 MG/DL (ref 1.8–2.4)

## 2019-02-19 PROCEDURE — 83735 ASSAY OF MAGNESIUM: CPT

## 2019-02-19 PROCEDURE — 2580000003 HC RX 258: Performed by: NURSE PRACTITIONER

## 2019-02-19 PROCEDURE — APPSS30 APP SPLIT SHARED TIME 16-30 MINUTES: Performed by: NURSE PRACTITIONER

## 2019-02-19 PROCEDURE — 6370000000 HC RX 637 (ALT 250 FOR IP): Performed by: NURSE PRACTITIONER

## 2019-02-19 PROCEDURE — 99232 SBSQ HOSP IP/OBS MODERATE 35: CPT | Performed by: INTERNAL MEDICINE

## 2019-02-19 PROCEDURE — 6370000000 HC RX 637 (ALT 250 FOR IP): Performed by: INTERNAL MEDICINE

## 2019-02-19 PROCEDURE — 71045 X-RAY EXAM CHEST 1 VIEW: CPT

## 2019-02-19 PROCEDURE — 6360000002 HC RX W HCPCS: Performed by: NURSE PRACTITIONER

## 2019-02-19 PROCEDURE — 36415 COLL VENOUS BLD VENIPUNCTURE: CPT

## 2019-02-19 RX ORDER — AMOXICILLIN AND CLAVULANATE POTASSIUM 875; 125 MG/1; MG/1
1 TABLET, FILM COATED ORAL EVERY 12 HOURS SCHEDULED
Qty: 14 TABLET | Refills: 0 | Status: SHIPPED | OUTPATIENT
Start: 2019-02-19 | End: 2019-02-19

## 2019-02-19 RX ORDER — AMOXICILLIN AND CLAVULANATE POTASSIUM 875; 125 MG/1; MG/1
1 TABLET, FILM COATED ORAL EVERY 12 HOURS SCHEDULED
Qty: 14 TABLET | Refills: 0 | Status: SHIPPED | OUTPATIENT
Start: 2019-02-19 | End: 2019-02-26

## 2019-02-19 RX ORDER — TRAMADOL HYDROCHLORIDE 50 MG/1
50 TABLET ORAL EVERY 6 HOURS PRN
Qty: 30 TABLET | Refills: 0 | Status: SHIPPED | OUTPATIENT
Start: 2019-02-19 | End: 2019-02-26

## 2019-02-19 RX ADMIN — ATORVASTATIN CALCIUM 10 MG: 10 TABLET, FILM COATED ORAL at 08:36

## 2019-02-19 RX ADMIN — MULTIPLE VITAMINS W/ MINERALS TAB 1 TABLET: TAB at 08:36

## 2019-02-19 RX ADMIN — SODIUM CHLORIDE, PRESERVATIVE FREE 10 ML: 5 INJECTION INTRAVENOUS at 08:34

## 2019-02-19 RX ADMIN — GABAPENTIN 300 MG: 300 CAPSULE ORAL at 08:36

## 2019-02-19 RX ADMIN — DOCUSATE SODIUM 100 MG: 100 CAPSULE, LIQUID FILLED ORAL at 08:36

## 2019-02-19 RX ADMIN — GABAPENTIN 300 MG: 300 CAPSULE ORAL at 13:12

## 2019-02-19 RX ADMIN — Medication 200 MG: at 13:12

## 2019-02-19 RX ADMIN — ENOXAPARIN SODIUM 40 MG: 40 INJECTION SUBCUTANEOUS at 08:36

## 2019-02-19 RX ADMIN — AMOXICILLIN AND CLAVULANATE POTASSIUM 1 TABLET: 875; 125 TABLET, FILM COATED ORAL at 08:36

## 2019-02-19 RX ADMIN — PANTOPRAZOLE SODIUM 40 MG: 40 TABLET, DELAYED RELEASE ORAL at 06:05

## 2019-02-19 ASSESSMENT — PAIN SCALES - GENERAL
PAINLEVEL_OUTOF10: 0

## 2019-02-27 ENCOUNTER — INITIAL CONSULT (OUTPATIENT)
Dept: PULMONOLOGY | Age: 69
End: 2019-02-27
Payer: MEDICARE

## 2019-02-27 VITALS
DIASTOLIC BLOOD PRESSURE: 72 MMHG | RESPIRATION RATE: 20 BRPM | WEIGHT: 195 LBS | BODY MASS INDEX: 26.41 KG/M2 | SYSTOLIC BLOOD PRESSURE: 110 MMHG | OXYGEN SATURATION: 99 % | HEIGHT: 72 IN | HEART RATE: 106 BPM

## 2019-02-27 DIAGNOSIS — R06.02 SHORTNESS OF BREATH: ICD-10-CM

## 2019-02-27 DIAGNOSIS — Z87.891 FORMER SMOKER: ICD-10-CM

## 2019-02-27 DIAGNOSIS — J94.2 HEMOTHORAX ON RIGHT: ICD-10-CM

## 2019-02-27 DIAGNOSIS — G47.33 OBSTRUCTIVE SLEEP APNEA: Primary | ICD-10-CM

## 2019-02-27 PROCEDURE — 4040F PNEUMOC VAC/ADMIN/RCVD: CPT | Performed by: INTERNAL MEDICINE

## 2019-02-27 PROCEDURE — 1101F PT FALLS ASSESS-DOCD LE1/YR: CPT | Performed by: INTERNAL MEDICINE

## 2019-02-27 PROCEDURE — G8419 CALC BMI OUT NRM PARAM NOF/U: HCPCS | Performed by: INTERNAL MEDICINE

## 2019-02-27 PROCEDURE — 1036F TOBACCO NON-USER: CPT | Performed by: INTERNAL MEDICINE

## 2019-02-27 PROCEDURE — 3017F COLORECTAL CA SCREEN DOC REV: CPT | Performed by: INTERNAL MEDICINE

## 2019-02-27 PROCEDURE — 99214 OFFICE O/P EST MOD 30 MIN: CPT | Performed by: INTERNAL MEDICINE

## 2019-02-27 PROCEDURE — 1123F ACP DISCUSS/DSCN MKR DOCD: CPT | Performed by: INTERNAL MEDICINE

## 2019-02-27 PROCEDURE — 1111F DSCHRG MED/CURRENT MED MERGE: CPT | Performed by: INTERNAL MEDICINE

## 2019-02-27 PROCEDURE — G8427 DOCREV CUR MEDS BY ELIG CLIN: HCPCS | Performed by: INTERNAL MEDICINE

## 2019-02-27 PROCEDURE — G8484 FLU IMMUNIZE NO ADMIN: HCPCS | Performed by: INTERNAL MEDICINE

## 2019-02-27 RX ORDER — TRAMADOL HYDROCHLORIDE 50 MG/1
50 TABLET ORAL EVERY 6 HOURS PRN
COMMUNITY

## 2019-02-27 ASSESSMENT — SLEEP AND FATIGUE QUESTIONNAIRES
ESS TOTAL SCORE: 6
HOW LIKELY ARE YOU TO NOD OFF OR FALL ASLEEP WHEN YOU ARE A PASSENGER IN A CAR FOR AN HOUR WITHOUT A BREAK: 1
HOW LIKELY ARE YOU TO NOD OFF OR FALL ASLEEP WHILE WATCHING TV: 0
HOW LIKELY ARE YOU TO NOD OFF OR FALL ASLEEP WHILE SITTING QUIETLY AFTER LUNCH WITHOUT ALCOHOL: 0
HOW LIKELY ARE YOU TO NOD OFF OR FALL ASLEEP IN A CAR, WHILE STOPPED FOR A FEW MINUTES IN TRAFFIC: 0
HOW LIKELY ARE YOU TO NOD OFF OR FALL ASLEEP WHILE LYING DOWN TO REST IN THE AFTERNOON WHEN CIRCUMSTANCES PERMIT: 2
NECK CIRCUMFERENCE (INCHES): 16.5
HOW LIKELY ARE YOU TO NOD OFF OR FALL ASLEEP WHILE SITTING AND READING: 1
HOW LIKELY ARE YOU TO NOD OFF OR FALL ASLEEP WHILE SITTING INACTIVE IN A PUBLIC PLACE: 2
HOW LIKELY ARE YOU TO NOD OFF OR FALL ASLEEP WHILE SITTING AND TALKING TO SOMEONE: 0

## 2019-03-04 ENCOUNTER — HOSPITAL ENCOUNTER (OUTPATIENT)
Dept: GENERAL RADIOLOGY | Age: 69
Discharge: HOME | End: 2019-03-04
Payer: MEDICARE

## 2019-03-04 ENCOUNTER — HOSPITAL ENCOUNTER (OUTPATIENT)
Age: 69
Discharge: HOME | End: 2019-03-04
Payer: MEDICARE

## 2019-03-04 DIAGNOSIS — J94.2 HEMOTHORAX: ICD-10-CM

## 2019-03-04 PROCEDURE — 71046 X-RAY EXAM CHEST 2 VIEWS: CPT

## 2019-03-05 ENCOUNTER — TELEPHONE (OUTPATIENT)
Dept: PULMONOLOGY | Age: 69
End: 2019-03-05

## 2019-04-09 ENCOUNTER — TELEPHONE (OUTPATIENT)
Dept: PULMONOLOGY | Age: 69
End: 2019-04-09

## 2019-05-28 ENCOUNTER — TELEPHONE (OUTPATIENT)
Dept: PULMONOLOGY | Age: 69
End: 2019-05-28

## 2019-06-05 ENCOUNTER — HOSPITAL ENCOUNTER (OUTPATIENT)
Dept: GENERAL RADIOLOGY | Age: 69
Discharge: HOME | End: 2019-06-05
Payer: MEDICARE

## 2019-06-05 ENCOUNTER — HOSPITAL ENCOUNTER (OUTPATIENT)
Age: 69
Discharge: HOME | End: 2019-06-05
Payer: MEDICARE

## 2019-06-05 DIAGNOSIS — R91.8 LUNG MASS: ICD-10-CM

## 2019-06-05 DIAGNOSIS — R91.8 MASS OF UPPER LOBE OF LUNG: ICD-10-CM

## 2019-06-05 PROCEDURE — 71046 X-RAY EXAM CHEST 2 VIEWS: CPT

## 2020-03-02 ENCOUNTER — APPOINTMENT (OUTPATIENT)
Dept: CT IMAGING | Age: 70
End: 2020-03-02
Payer: MEDICARE

## 2020-03-02 ENCOUNTER — HOSPITAL ENCOUNTER (EMERGENCY)
Age: 70
Discharge: ANOTHER HOSPITAL | End: 2020-03-02
Attending: EMERGENCY MEDICINE
Payer: MEDICARE

## 2020-03-02 ENCOUNTER — APPOINTMENT (OUTPATIENT)
Dept: GENERAL RADIOLOGY | Age: 70
End: 2020-03-02
Payer: MEDICARE

## 2020-03-02 VITALS
RESPIRATION RATE: 16 BRPM | TEMPERATURE: 98.5 F | HEART RATE: 85 BPM | BODY MASS INDEX: 31.5 KG/M2 | DIASTOLIC BLOOD PRESSURE: 64 MMHG | WEIGHT: 225 LBS | OXYGEN SATURATION: 96 % | HEIGHT: 71 IN | SYSTOLIC BLOOD PRESSURE: 134 MMHG

## 2020-03-02 LAB
ALBUMIN SERPL-MCNC: 4.6 GM/DL (ref 3.4–5)
ALP BLD-CCNC: 73 IU/L (ref 40–129)
ALT SERPL-CCNC: 20 U/L (ref 10–40)
ANION GAP SERPL CALCULATED.3IONS-SCNC: 14 MMOL/L (ref 4–16)
AST SERPL-CCNC: 23 IU/L (ref 15–37)
BASOPHILS ABSOLUTE: 0 K/CU MM
BASOPHILS RELATIVE PERCENT: 0.2 % (ref 0–1)
BILIRUB SERPL-MCNC: 0.4 MG/DL (ref 0–1)
BUN BLDV-MCNC: 10 MG/DL (ref 6–23)
CALCIUM SERPL-MCNC: 8.9 MG/DL (ref 8.3–10.6)
CHLORIDE BLD-SCNC: 97 MMOL/L (ref 99–110)
CHP ED QC CHECK: YES
CO2: 28 MMOL/L (ref 21–32)
CREAT SERPL-MCNC: 0.8 MG/DL (ref 0.9–1.3)
DIFFERENTIAL TYPE: ABNORMAL
EOSINOPHILS ABSOLUTE: 0.1 K/CU MM
EOSINOPHILS RELATIVE PERCENT: 1.4 % (ref 0–3)
GFR AFRICAN AMERICAN: >60 ML/MIN/1.73M2
GFR NON-AFRICAN AMERICAN: >60 ML/MIN/1.73M2
GLUCOSE BLD-MCNC: 108 MG/DL (ref 70–99)
GLUCOSE BLD-MCNC: 109 MG/DL
GLUCOSE BLD-MCNC: 109 MG/DL (ref 70–99)
HCT VFR BLD CALC: 45.2 % (ref 42–52)
HEMOGLOBIN: 13.3 GM/DL (ref 13.5–18)
IMMATURE NEUTROPHIL %: 0.3 % (ref 0–0.43)
INR BLD: 0.92 INDEX
LIPASE: 14 IU/L (ref 13–60)
LYMPHOCYTES ABSOLUTE: 1.5 K/CU MM
LYMPHOCYTES RELATIVE PERCENT: 23.9 % (ref 24–44)
MCH RBC QN AUTO: 24 PG (ref 27–31)
MCHC RBC AUTO-ENTMCNC: 29.4 % (ref 32–36)
MCV RBC AUTO: 81.4 FL (ref 78–100)
MONOCYTES ABSOLUTE: 0.7 K/CU MM
MONOCYTES RELATIVE PERCENT: 11.3 % (ref 0–4)
NUCLEATED RBC %: 0 %
PDW BLD-RTO: 17.3 % (ref 11.7–14.9)
PLATELET # BLD: 202 K/CU MM (ref 140–440)
PMV BLD AUTO: 9.9 FL (ref 7.5–11.1)
POTASSIUM SERPL-SCNC: 4 MMOL/L (ref 3.5–5.1)
PRO-BNP: 731.2 PG/ML
PROTHROMBIN TIME: 11.1 SECONDS (ref 11.7–14.5)
RBC # BLD: 5.55 M/CU MM (ref 4.6–6.2)
SEGMENTED NEUTROPHILS ABSOLUTE COUNT: 4 K/CU MM
SEGMENTED NEUTROPHILS RELATIVE PERCENT: 62.9 % (ref 36–66)
SODIUM BLD-SCNC: 139 MMOL/L (ref 135–145)
TOTAL IMMATURE NEUTOROPHIL: 0.02 K/CU MM
TOTAL NUCLEATED RBC: 0 K/CU MM
TOTAL PROTEIN: 7.6 GM/DL (ref 6.4–8.2)
TROPONIN T: 0.01 NG/ML
WBC # BLD: 6.3 K/CU MM (ref 4–10.5)

## 2020-03-02 PROCEDURE — 70450 CT HEAD/BRAIN W/O DYE: CPT

## 2020-03-02 PROCEDURE — 85025 COMPLETE CBC W/AUTO DIFF WBC: CPT

## 2020-03-02 PROCEDURE — 83690 ASSAY OF LIPASE: CPT

## 2020-03-02 PROCEDURE — 36415 COLL VENOUS BLD VENIPUNCTURE: CPT

## 2020-03-02 PROCEDURE — 85610 PROTHROMBIN TIME: CPT

## 2020-03-02 PROCEDURE — 82962 GLUCOSE BLOOD TEST: CPT

## 2020-03-02 PROCEDURE — 83880 ASSAY OF NATRIURETIC PEPTIDE: CPT

## 2020-03-02 PROCEDURE — 6360000002 HC RX W HCPCS: Performed by: EMERGENCY MEDICINE

## 2020-03-02 PROCEDURE — 93005 ELECTROCARDIOGRAM TRACING: CPT | Performed by: PHYSICIAN ASSISTANT

## 2020-03-02 PROCEDURE — 99291 CRITICAL CARE FIRST HOUR: CPT

## 2020-03-02 PROCEDURE — 2580000003 HC RX 258: Performed by: EMERGENCY MEDICINE

## 2020-03-02 PROCEDURE — 80053 COMPREHEN METABOLIC PANEL: CPT

## 2020-03-02 PROCEDURE — 96365 THER/PROPH/DIAG IV INF INIT: CPT

## 2020-03-02 PROCEDURE — 84484 ASSAY OF TROPONIN QUANT: CPT

## 2020-03-02 RX ADMIN — DESMOPRESSIN ACETATE 24 MCG: 4 SOLUTION INTRAVENOUS at 18:49

## 2020-03-02 NOTE — ED TRIAGE NOTES
Patient c/o dizziness that worse today, a headache when bending over for the past week, has been treated for sinus infection previously, and unsteady gait. Patient states that symptoms have been going on for a few weeks.

## 2020-03-02 NOTE — ED NOTES
Pt and family informed of patient pending transfer to [facility] via MICU, ETA 40 min     Piter Reynaga RN  03/02/20 9746

## 2020-03-02 NOTE — ED PROVIDER NOTES
Triage Chief Complaint:   Dizziness; Gait Problem; and Headache (when bending over)    [place]:  Today in the ED I had the pleasure of caring for Wanda Aparicio who is a 77 y.o. male that presents today to the emergency department complaining of dizziness gait problem. Context is    Over the last 3 weeks patient has had sinus congestion. Particularly has had pressure in his face/head whenever he leans forward. PCP did evaluate him and diagnosed him with sinusitis gave him a prescription for azithromycin. Patient states his symptoms have improved modestly. He woke up today feeling baseline ate breakfast and at around noon today took a nap when he woke up from his nap he was very disoriented felt dizzy felt confused tried to call his family members but could not remember the numbers and could not figure out how to work the phone. Family members came over and checked on patient patient said he could not figure out where his phone was in the phone was directly in front of him. So patient was brought here to the ED. Patient does have a history of CVA roughly 21 years ago per patient and family members he has no residual deficits from that. Patient himself denies any musculoskeletal weakness no chest pain no palpitations. He does endorse still being a little disoriented. He also endorses skips to stubbing his foot and running into things.       ROS:  REVIEW OF SYSTEMS    At least 10 systems reviewed      All other review of systems are negative  See HPI and nursing notes for additional information       Past Medical History:   Diagnosis Date    Arthritis     Former smoker 2/27/2019    GERD (gastroesophageal reflux disease)     Hx of fall     per old chart pt in ER 1/18/2019 after fall - fx 4 ribs and small hemothorax    Hyperlipidemia     Hypertension     Obstructive sleep apnea 2/27/2019    Pleural effusion     per old chart pt had CXR done 2/13/2019 noted large pleural effusion for thoracentesis 2/14/2019 conjunctivae clear sclerae white there is no injection no icterus. Nose without any rhinorrhea or epistaxis. Oral mucosa is moist no exudate buccal mucosa shows no ulcerations. Uvula is midline    Neck: Neck is supple full range of motion trachea midline thyroid nonpalpable  Cardiac: Heart regular rate rhythm no murmurs rubs clicks or gallops  Lungs: Lungs are clear to auscultation there is no wheezing rhonchi or rales. There is no use of accessory muscles no nasal flaring identified. Chest wall: There is no tenderness to palpation over the chest wall or over ribs  Abdomen: Abdomen is soft nontender nondistended. There is no firm or pulsatile masses no rebound rigidity or guarding negative Li's negative McBurney, no peritoneal signs  Suprapubic:  there is no tenderness to palpation over the external bladder   Musculoskeletal: 5 out of 5 strength in all 4 extremities full flexion extension abduction and adduction supination pronation of all extremities and all digits. No obvious muscle atrophy is noted.  No focal muscle deficits are appreciated  Dermatology: Skin is warm and dry there is no obvious abscesses lacerations or lesions noted  Psych: Mentation is grossly normal cognition is grossly normal. Affect is appropriate        I have reviewed and interpreted all of the currently available lab results from this visit (if applicable):  Results for orders placed or performed during the hospital encounter of 03/02/20   CBC Auto Differential   Result Value Ref Range    WBC 6.3 4.0 - 10.5 K/CU MM    RBC 5.55 4.6 - 6.2 M/CU MM    Hemoglobin 13.3 (L) 13.5 - 18.0 GM/DL    Hematocrit 45.2 42 - 52 %    MCV 81.4 78 - 100 FL    MCH 24.0 (L) 27 - 31 PG    MCHC 29.4 (L) 32.0 - 36.0 %    RDW 17.3 (H) 11.7 - 14.9 %    Platelets 339 470 - 245 K/CU MM    MPV 9.9 7.5 - 11.1 FL    Differential Type AUTOMATED DIFFERENTIAL     Segs Relative 62.9 36 - 66 %    Lymphocytes % 23.9 (L) 24 - 44 %    Monocytes % 11.3 (H) 0 - 4 % [address] [facility] [facility] Beth Must  03/02/20 6419

## 2020-03-02 NOTE — ED NOTES
Pt O2 sat 91% on room air.  Pt placed on 2 Lpm O2 via [facility], RN  03/02/20 7079

## 2020-03-03 PROCEDURE — 93010 ELECTROCARDIOGRAM REPORT: CPT | Performed by: INTERNAL MEDICINE

## 2020-03-09 LAB
EKG ATRIAL RATE: 83 BPM
EKG DIAGNOSIS: NORMAL
EKG P AXIS: 52 DEGREES
EKG P-R INTERVAL: 148 MS
EKG Q-T INTERVAL: 408 MS
EKG QRS DURATION: 94 MS
EKG QTC CALCULATION (BAZETT): 479 MS
EKG R AXIS: -10 DEGREES
EKG T AXIS: 36 DEGREES
EKG VENTRICULAR RATE: 83 BPM

## 2020-03-11 ENCOUNTER — HOSPITAL ENCOUNTER (INPATIENT)
Age: 70
LOS: 6 days | Discharge: HOME | DRG: 057 | End: 2020-03-17
Attending: PHYSICAL MEDICINE & REHABILITATION | Admitting: PHYSICAL MEDICINE & REHABILITATION
Payer: MEDICARE

## 2020-03-11 PROBLEM — I69.398 CVA, OLD, DISTURBANCES OF VISION: Status: ACTIVE | Noted: 2020-03-11

## 2020-03-11 PROBLEM — H53.9 CVA, OLD, DISTURBANCES OF VISION: Status: ACTIVE | Noted: 2020-03-11

## 2020-03-11 LAB
GLUCOSE BLD-MCNC: 117 MG/DL (ref 70–99)
GLUCOSE BLD-MCNC: 169 MG/DL (ref 70–99)

## 2020-03-11 PROCEDURE — 1280000000 HC REHAB R&B

## 2020-03-11 PROCEDURE — 94761 N-INVAS EAR/PLS OXIMETRY MLT: CPT

## 2020-03-11 PROCEDURE — 6370000000 HC RX 637 (ALT 250 FOR IP): Performed by: PHYSICAL MEDICINE & REHABILITATION

## 2020-03-11 PROCEDURE — 6360000002 HC RX W HCPCS: Performed by: PHYSICAL MEDICINE & REHABILITATION

## 2020-03-11 PROCEDURE — 99223 1ST HOSP IP/OBS HIGH 75: CPT | Performed by: PHYSICAL MEDICINE & REHABILITATION

## 2020-03-11 PROCEDURE — 82962 GLUCOSE BLOOD TEST: CPT

## 2020-03-11 RX ORDER — HEPARIN SODIUM 5000 [USP'U]/ML
5000 INJECTION, SOLUTION INTRAVENOUS; SUBCUTANEOUS EVERY 8 HOURS SCHEDULED
Status: DISCONTINUED | OUTPATIENT
Start: 2020-03-11 | End: 2020-03-17

## 2020-03-11 RX ORDER — PANTOPRAZOLE SODIUM 40 MG/1
40 TABLET, DELAYED RELEASE ORAL EVERY OTHER DAY
Status: DISCONTINUED | OUTPATIENT
Start: 2020-03-12 | End: 2020-03-17 | Stop reason: HOSPADM

## 2020-03-11 RX ORDER — AMLODIPINE BESYLATE 10 MG/1
10 TABLET ORAL DAILY
Status: DISCONTINUED | OUTPATIENT
Start: 2020-03-12 | End: 2020-03-17 | Stop reason: HOSPADM

## 2020-03-11 RX ORDER — ACETAMINOPHEN 325 MG/1
650 TABLET ORAL EVERY 4 HOURS PRN
Status: DISCONTINUED | OUTPATIENT
Start: 2020-03-11 | End: 2020-03-17 | Stop reason: HOSPADM

## 2020-03-11 RX ORDER — ATORVASTATIN CALCIUM 40 MG/1
40 TABLET, FILM COATED ORAL NIGHTLY
Status: DISCONTINUED | OUTPATIENT
Start: 2020-03-12 | End: 2020-03-17 | Stop reason: HOSPADM

## 2020-03-11 RX ORDER — AZITHROMYCIN 250 MG/1
500 TABLET, FILM COATED ORAL DAILY
Status: DISCONTINUED | OUTPATIENT
Start: 2020-03-12 | End: 2020-03-17 | Stop reason: HOSPADM

## 2020-03-11 RX ORDER — THIAMINE MONONITRATE (VIT B1) 100 MG
100 TABLET ORAL DAILY
Status: DISCONTINUED | OUTPATIENT
Start: 2020-03-12 | End: 2020-03-17 | Stop reason: HOSPADM

## 2020-03-11 RX ORDER — CITALOPRAM 20 MG/1
10 TABLET ORAL 2 TIMES DAILY
Status: DISCONTINUED | OUTPATIENT
Start: 2020-03-11 | End: 2020-03-17 | Stop reason: HOSPADM

## 2020-03-11 RX ORDER — POLYETHYLENE GLYCOL 3350 17 G/17G
17 POWDER, FOR SOLUTION ORAL DAILY PRN
Status: DISCONTINUED | OUTPATIENT
Start: 2020-03-11 | End: 2020-03-17 | Stop reason: HOSPADM

## 2020-03-11 RX ORDER — HYDROCODONE BITARTRATE AND ACETAMINOPHEN 5; 325 MG/1; MG/1
2 TABLET ORAL EVERY 4 HOURS PRN
Status: DISCONTINUED | OUTPATIENT
Start: 2020-03-11 | End: 2020-03-17 | Stop reason: HOSPADM

## 2020-03-11 RX ADMIN — CITALOPRAM HYDROBROMIDE 10 MG: 20 TABLET ORAL at 20:35

## 2020-03-11 RX ADMIN — HEPARIN SODIUM 5000 UNITS: 5000 INJECTION INTRAVENOUS; SUBCUTANEOUS at 20:35

## 2020-03-11 ASSESSMENT — PAIN SCALES - GENERAL: PAINLEVEL_OUTOF10: 0

## 2020-03-11 NOTE — H&P
Magi Constantino    : 1950  [facility]  MRN: [id number]              History and physical      Admitting diagnosis: Hemorrhagic stroke (right MCA distribution IPH)    Comorbid diagnoses impacting rehabilitation: Left hemiparesis, disconjugate gaze, hypertension, hyperlipidemia, obstructive sleep apnea, gait disturbance    Chief complaint: Dizziness and clumsiness with standing    History of present illness: Patient is a 21-year-old right-hand-dominant male who is struggling with sinusitis for several weeks before presenting to our ED on 3/2/2020. That day he awoke from a midday nap confused with altered vision and difficulty walking. In our ED he had left hemiparesis and a lack of left visual gaze. His mental status was altered. Brain imaging showed a significant right MCA distribution hemorrhage as well as some punctate bleeding in the left cerebral cortex. He was urgently transferred to [facility] for neurosurgical monitoring. No invasive procedures were required but his left-sided weakness has persisted. The patient's disconjugate gaze is improving and he still has symptoms of sinus pressure. He is having painful hiccups as well. He has required verbal cues and 25% assistance with mobility and self-care. Review of systems: Intermittent visual disturbance, headache and hiccups. His appetite is been fair. Bowel movements are occasional.  He is controlling his bladder. He has no new difficulty breathing or nausea. The remainder of their review of systems was negative except as mentioned in the history of present illness. Social History: The patient is unmarried living alone in a two-step entry multilevel home with a laundry in the basement, the bed and bath upstairs (full flight of steps) in the main level up two steps. He has a tub shower combination for bathing with a clawfoot tub and a standard height commode. There are grab bars in the bathroom.   He has access to a rolling walker and a cane but was not needing any adaptive equipment for ambulation prior to admission. Although he had had a significant stroke 21 years ago, there had not been any residual functional deficits. He is normally independent with his own medication administration, personal hygiene and homemaking including meal prep. He drives locally. He just retired this past [address] Day from the [employer]. He reports that he quit smoking about 10 years ago. His smoking use included cigarettes. He has a 15.00 pack-year smoking history. He has never used smokeless tobacco. He reports current alcohol use. He reports that he does not use drugs. Prior (baseline) level of function: Independent with mobility and self-care. Current level of function: 25% assistance needed with mobility and self-care. Allergies:  Patient has no known allergies.     Past Medical History:   Past Medical History:   Diagnosis Date    Arthritis     Former smoker 2/27/2019    GERD (gastroesophageal reflux disease)     Hx of fall     per old chart pt in ER 1/18/2019 after fall - fx 4 ribs and small hemothorax    Hyperlipidemia     Hypertension     Obstructive sleep apnea 2/27/2019    Pleural effusion     per old chart pt had CXR done 2/13/2019 noted large pleural effusion for thoracentesis 2/14/2019    Shortness of breath 2/27/2019    Unspecified cerebral artery occlusion with cerebral infarction 1997    no residual effects        Past Surgical History:     Past Surgical History:   Procedure Laterality Date    BUNIONECTOMY      right great toe    COLONOSCOPY      JOINT REPLACEMENT      rigth total knee       Current Medications:     Current Facility-Administered Medications:     acetaminophen (TYLENOL) tablet 650 mg, 650 mg, Oral, Q4H PRN, C Merly Barragan MD    polyethylene glycol (GLYCOLAX) packet 17 g, 17 g, Oral, Daily PRN, KARLA Barragan MD    enoxaparin (LOVENOX) injection 40 mg, 40 this medication. Weightbearing activities are pursued daily. GI prophylaxis offered. 3. Hypertension: Patient requires Norvasc for blood pressure regulation. Target systolic blood pressure is 120-140. Vital signs are checked at rest and with activity and consistent oral intake is encouraged. 4. Hyperlipidemia: Lipitor 40 mg nightly. General diet at this time. 5. Obstructive sleep apnea: Patient does not have an order for BiPAP at this time. I will investigate this further. 6. Hiccups: Patient is had significant dyspepsia and hiccups pain recently. Thorazine at low dose was trialed at the outside hospital.  We will make this available here as well. I personally performed a history and physical on this patient within 24 hours of admission to the rehab unit. I have reviewed the preadmission screening and concur with its findings without change. A detailed plan of care will be established by hospital day 4 and I attest the patient is appropriate for inpatient rehabilitation at this time. I have compared the patient's current functional status noted during my history and physical with that of the preadmission screen and I have found no significant differences.

## 2020-03-11 NOTE — PLAN OF CARE
ARU Interdisciplinary Plan of Care (IPOC)  [facility]ir[facility], [address]  [phone]  Fax: [phone]        Karlene Ware    : 1950  Acct #: [de-identified]  MRN: [id number]   PHYSICIAN:  Roselia Cohen MD  Primary Active Problems:   Active Hospital Problems    Diagnosis Date Noted    CVA, old, disturbances of vision [L26.916, H53.9] 2020       Rehabilitation Diagnosis:     CVA, old, disturbances of vision [G38.995, H53.9]       ADMIT DATE:3/11/2020   CARE PLAN     NURSING:  Karlene Ware while on this unit will:      Bowel and Bladder   [x] Be continent of bowel and bladder      [x] Have an adequate number of bowel movements   [] Urinate with no urinary retention >300ml in bladder   [] Bladder Scan: (details)   [] Complete bladder protocol with horta removal   [] Initiate Bladder Program to toilet every ___ hours   [] Initiate Bowel Program to toilet every ___hours   [] Bladder training    [] Bowel training  Pulmonary   [x] Maintain O2 SATs at 92% or greater  Pain Management   [x] Have pain managed while on ARU        [x] Be pain free by discharge    [x] Medication Management and Education  Maintenance of Skin Integrity/Wound Management   [x] Have no skin breakdown while on ARU   [] Have improved skin integrity via wound measurements   [] Have no signs/symptoms of infection via infection protection and monitoring at the          wound site  Fall Prevention   [x] Be free from injury during hospitalization via fall prevention measures     [x] Disease management and Education  Precautions   [] Weight Bearing Precautions   [] Swallowing Precautions   [x] Monitoring of Risks of Complications   [x] DVT Prophylaxis    [x] Fluid/electrolyte/Nutrition Management    [] Complete education with patient/family with understanding demonstrated for          in-room safety with transfers to bed, toilet, wheelchair, shower as well as bathroom activities and hygiene. [] Adjustment   [] Other:   Nursing interventions may include bowel/bladder training, education for medical assistive devices, medication education, O2 saturation management, energy conservation, stress management techniques, fall prevention, alarms protocol, seating and positioning, skin/wound care, pressure relief instruction,dressing changes,  infection protection, DVT prophylaxis, and/or assistance with in room safety with transfers to bed, toilet, wheelchair, shower as well as bathroom activities and hygiene. Patient/caregiver education for:   [x] Disease/sustained injury/management      [x] Medication Use   [] Surgical intervention   [x] Safety/Precautions   [] Body mechanics and or joint protection   [x] Health maintenance         PHYSICAL THERAPY:  Goals:                  Short term goals  Time Frame for Short term goals: 7 days  Short term goal 1: Pt will perform transfers for sit to stand, pivot and car with mod I  Short term goal 2: Pt will ambulate 300' with straight cane on level surfaces with mod I   Short term goal 3: Pt will demonstrate 20' gait on unlevel surface with supervision with straight cane  Short term goal 4: Pt will ascend/descend curb and flight of stairs with supervision using cane and grab bars as needed. Short term goal 5: Pt will demonstrate ability to  light object from floor with mod I               These goals were reviewed with this patient at the time of assessment and Ira Hearn is in agreement. Plan of Care: Pt to be seen 5 days per week for a minimum of 60 minutes for 7 days.                 Current Treatment Recommendations: Functional Mobility Training, Neuromuscular Re-education, Home Exercise Program, Equipment Evaluation, Education, & procurement, Transfer Training, Endurance Training, Stair training, Patient/Caregiver Education & Training, Balance Training, Gait Training community reintegration,animal 4  Discharge Goal: Independent  Walk 10 Feet? Walk 10 Feet?: Yes  1 Step  1 Step?: Yes  Picking Up Object  Assistance Needed: Partial/moderate assistance  CARE Score: 3  Discharge Goal: Independent  Wheelchair Ability  Uses a Wheelchair and/or Scooter?: No                OT IRF-ANGELINA scores and goals for initial assessment:    Eating  Assistance Needed: Setup or clean-up assistance  Comment: did not initiate eating meal tray even with cues, required therapist handing him fork to begin eating  CARE Score: 5  Discharge Goal: Independent  Oral Hygiene  Assistance Needed: Supervision or touching assistance  CARE Score: 4  Discharge Goal: Independent  Toileting Hygiene  Assistance Needed: Supervision or touching assistance  Comment: CGA-SBA in stance  CARE Score: 4  Discharge Goal: Supervision or touching assistance  Shower/Bathe Self  Assistance Needed: Supervision or touching assistance  CARE Score: 4  Discharge Goal: Supervision or touching assistance  Upper Body Dressing  Assistance Needed: Supervision or touching assistance  CARE Score: 4  Discharge Goal: Set-up or clean-up assistance  Lower Body Dressing  Assistance Needed: Supervision or touching assistance  CARE Score: 4  Discharge Goal: Set-up or clean-up assistance  Putting On/Taking Off Footwear  Assistance Needed: Supervision or touching assistance  CARE Score: 4  Discharge Goal: Set-up or clean-up assistance    Activities Prior to Admit:   Homemaking Responsibilities: Yes  Active : Yes  Mode of Transportation: [facility]  Occupation: Retired  Leisure & Hobbies: TV, talk on phone, clean house, pt sets up meds via pill box, pt manages finances; grocery, wash car, out to eat,  club. Intensity of Therapy  Maryan Marquez will be seen a minimum of 3 hours of therapy per day/a minimum of 5 out of 7 days per week.     [] In this rare instance due to the nature of this patient's medical involvement, this patient will be seen 15 hours per week (900 minutes within a 7 day period). Treatments may include therapeutic exercises, gait training, neuromuscular re-ed, transfer training, community reintegration, bed mobility, w/c mobility and training, self care, home mgmt, cognitive training, energy conservation,dysphagia tx, speech/language/communication therapy, group therapy, and patient/family education. In addition, dietician/nutritionist may monitor calorie count as well as intake and collaboratively work with SLP on dietary upgrades. Neuropsychology/Psychology may evaluate and provide necessary support. Group therapy as appropriate to facilitate improved endurance, STR, COORD, function, safety, transfers, awareness and insight into deficits, problem solving, memory, and social interaction and engagement. Medical issues being managed closely and that require 24 hour availability of a physician:   [x] Swallowing Precautions                                     [] Weight bearing precautions   [] Wound Care                             [x] Infection Prevention   [x] DVT Prophylaxis/assessment              [x] Monitoring for complications    [x] Fall Precautions/Prevention                         [x] Fluid/Electrolyte/Nutrition Balance   [x] Voice Protection                           [x] Medication Management   [x] Respiratory                   [x] Pain Mgmt   [x] Bowel/Bladder Fx    Medical Prognosis: [] Good  [x] Fair    [] Guarded   Total expected IRF days 12                                            Physician anticipated functional outcomes:  FWW or SPC and HHC or Out PT PT and supervision.   Rehab Goals:   [] Return to premorbid function of_______________________________.    [] Independent   [] Mod I  [x] Supervision  [] CGA   [] Min A   [] Mod A  Level for ambulation []without assistive device  [x] with assistive device        [] Independent   [] Mod I  [x] Supervision [] CGA   [] Min A   [] Mod A  Level for transfers []without assistive device  [x] with recently. Thorazine at low dose was trialed at the outside hospital.  We will make this available here as well. Anticipated discharge destination:    [] Home Independently   [x] Home with supervision    []SNF     [] Other       This plan has been reviewed with me in a language I understand. I have had the opportunity to include my input with my therapy team.    ________________________________________________   ______________________  Patient/Significant Other      Date    I have reviewed this initial plan of care and agree with its contents:    Title   Name    Date    Time    Physician: London Parra 3/14/2020 10:10 AM    Case Mgmt:   Carlo Arciniega 3/12/2020 1826    OT: Shellie Russo Ernesto 88, OTR/L 03/12/2020 1745    PT: Benito Cherry PT 3/12/20 16:34    RN: Krishna Chavez RN 3/11/2020 1507    ST: [address]  [place], [place], [address] [facility], [place] 3/12/2020  10:42 AM      Dietician: Marcellus Hi RD, FIFI 03/12/2020  17:30

## 2020-03-12 LAB
GLUCOSE BLD-MCNC: 118 MG/DL (ref 70–99)
GLUCOSE BLD-MCNC: 122 MG/DL (ref 70–99)
GLUCOSE BLD-MCNC: 122 MG/DL (ref 70–99)
GLUCOSE BLD-MCNC: 124 MG/DL (ref 70–99)

## 2020-03-12 PROCEDURE — 99232 SBSQ HOSP IP/OBS MODERATE 35: CPT | Performed by: PHYSICAL MEDICINE & REHABILITATION

## 2020-03-12 PROCEDURE — 1280000000 HC REHAB R&B

## 2020-03-12 PROCEDURE — 97530 THERAPEUTIC ACTIVITIES: CPT

## 2020-03-12 PROCEDURE — 92523 SPEECH SOUND LANG COMPREHEN: CPT

## 2020-03-12 PROCEDURE — 97163 PT EVAL HIGH COMPLEX 45 MIN: CPT

## 2020-03-12 PROCEDURE — 82962 GLUCOSE BLOOD TEST: CPT

## 2020-03-12 PROCEDURE — 6370000000 HC RX 637 (ALT 250 FOR IP): Performed by: PHYSICAL MEDICINE & REHABILITATION

## 2020-03-12 PROCEDURE — 6360000002 HC RX W HCPCS: Performed by: PHYSICAL MEDICINE & REHABILITATION

## 2020-03-12 PROCEDURE — 97535 SELF CARE MNGMENT TRAINING: CPT

## 2020-03-12 PROCEDURE — 97116 GAIT TRAINING THERAPY: CPT

## 2020-03-12 PROCEDURE — 97167 OT EVAL HIGH COMPLEX 60 MIN: CPT

## 2020-03-12 PROCEDURE — 97112 NEUROMUSCULAR REEDUCATION: CPT

## 2020-03-12 RX ADMIN — HEPARIN SODIUM 5000 UNITS: 5000 INJECTION INTRAVENOUS; SUBCUTANEOUS at 13:37

## 2020-03-12 RX ADMIN — ATORVASTATIN CALCIUM 40 MG: 40 TABLET, FILM COATED ORAL at 21:01

## 2020-03-12 RX ADMIN — Medication 100 MG: at 08:56

## 2020-03-12 RX ADMIN — AZITHROMYCIN 500 MG: 250 TABLET, FILM COATED ORAL at 08:56

## 2020-03-12 RX ADMIN — HEPARIN SODIUM 5000 UNITS: 5000 INJECTION INTRAVENOUS; SUBCUTANEOUS at 05:31

## 2020-03-12 RX ADMIN — AMLODIPINE BESYLATE 10 MG: 10 TABLET ORAL at 08:56

## 2020-03-12 RX ADMIN — ACETAMINOPHEN 650 MG: 325 TABLET ORAL at 21:06

## 2020-03-12 RX ADMIN — PANTOPRAZOLE SODIUM 40 MG: 40 TABLET, DELAYED RELEASE ORAL at 05:32

## 2020-03-12 RX ADMIN — CITALOPRAM HYDROBROMIDE 10 MG: 20 TABLET ORAL at 21:02

## 2020-03-12 RX ADMIN — CITALOPRAM HYDROBROMIDE 10 MG: 20 TABLET ORAL at 08:56

## 2020-03-12 RX ADMIN — HEPARIN SODIUM 5000 UNITS: 5000 INJECTION INTRAVENOUS; SUBCUTANEOUS at 21:11

## 2020-03-12 ASSESSMENT — PAIN SCALES - GENERAL: PAINLEVEL_OUTOF10: 5

## 2020-03-12 NOTE — PROGRESS NOTES
Tolerance      Electronically signed by Janet Reed RD, LD on 3/12/20 at 5:28 PM EDT    Contact Number: [phone]

## 2020-03-12 NOTE — PROGRESS NOTES
Case Management Admission Note      Patient:Clau Garibay      LYQ:[birth year]  RVY:[id number]  Rehab Dx/Hx: CVA, old, disturbances of vision Octavo.Grahn, H53.9]    Chief Complaint:   Past Medical History:   Diagnosis Date    Arthritis     Former smoker 2/27/2019    GERD (gastroesophageal reflux disease)     Hx of fall     per old chart pt in ER 1/18/2019 after fall - fx 4 ribs and small hemothorax    Hyperlipidemia     Hypertension     Obstructive sleep apnea 2/27/2019    Pleural effusion     per old chart pt had CXR done 2/13/2019 noted large pleural effusion for thoracentesis 2/14/2019    Shortness of breath 2/27/2019    Unspecified cerebral artery occlusion with cerebral infarction 1997    no residual effects     Past Surgical History:   Procedure Laterality Date    BUNIONECTOMY      right great toe    COLONOSCOPY      JOINT REPLACEMENT      rigth total knee     No Known Allergies  Precautions: falls    Date of Admit: 3/11/2020  Room #: 1017/1017-A      Current functional status at time of admit:        Home Living/DME Available:      Type of Home: House  Home Access: Stairs to enter without rails  Bathroom Shower/Tub: Tub/Shower unit([place])  Bathroom Toilet: Standard  Bathroom Equipment: Grab bars in shower, Grab bars around toilet  Home Equipment: Rolling walker, Cane       IADL Hx:   Homemaking Responsibilities: Yes  Active : Yes  Mode of Transportation: [facility]  Occupation: Retired  Leisure & Hobbies: TV, talk on phone, clean house, pt sets up meds via pill box, pt manages finances; grocery, wash car, out to eat,  club. Spouse: none  Family:  Two local sisters and a local brother    Comments:  Patient plans d/c home. Reports family can stay at discharge if needed, but likely will rely on them for PRN support. Patient complains of hiccups for \"5 days\". Patient requesting note for his employer with his ARU admit date. Case mgt will provide 3/13/20.   His family is assisting with

## 2020-03-12 NOTE — PROGRESS NOTES
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 Additional Comments: Pt typically sleeps in a flat, regular bed at home. Pt reports one fall in the past year (per charting occurred on 01/18 and caused rib fractures and hemothorax). Pt reports he has 2 sisters that live nearby    Restrictions:  Restrictions/Precautions  Restrictions/Precautions: General Precautions, Fall Risk(L neglect, impulsive)                  Pain Level: 0       Objective:  Observation/Palpation  Posture: Fair  Observation: Pt in high fowlers on approach, oriented to R side of room with awkward positioning in bed. Untouched meal tray resting on tray; with multiple cues pt did not initiate until therapist handed pt fork. Notified nursing staff on need for initial supervision/setup assist.     Orientation  Overall Orientation Status: Impaired(Knew it was March, didn't know year)  Orientation Level: Oriented to person, Disoriented to time, Oriented to situation, Oriented to place        Vision  Vision: Impaired(Recently hasn't been wearing glasses, reports after his remote stroke he needed to for a while)  Vision Exceptions: Visual field cut  Vision - Basic Assessment  Prior Vision: No visual deficits  Patient Visual Report: No visual complaint reported. Vision Comments: Mildly reduced L visual fields in both eyes; however presents with significant L neglect requiring significant cues to scan into L visual field to locate items.    Hearing  Hearing: Within functional limits    ROM:      LUE AROM (degrees)  LUE AROM : WFL     Left Hand AROM (degrees)  Left Hand AROM: WFL     RUE AROM (degrees)  RUE AROM : WFL     Right Hand AROM (degrees)  Right Hand AROM: WFL    Strength:    LUE Strength  Gross LUE Strength: Exceptions to [facility]  L Shoulder Flex: 3+/5  L Elbow Flex: 5/5  L Hand General: 5/5  LUE Strength Comment: Chronic shoulder weakness 2* RTC injury  RUE Strength  Gross RUE Strength: Exceptions to [facility]  R Shoulder Flex: 3+/5  R Elbow Flex: 5/5  R Hand General: 5/5  RUE Strength Comment: Chronic shoulder weakness 2* RTC injury    Quality of Movement: Tone RUE  RUE Tone: Normotonic  Tone LUE  LUE Tone: Normotonic  Coordination  Movements Are Fluid And Coordinated: Yes  Quality of Movement Other  Comment: Pt is R hand dominant. Sensation:    Sensation  Overall Sensation Status: WFL    ADL's:    ADL  Feeding: Setup, Supervision, Verbal cueing(was impulsive taking very large bites requiring cues)  Grooming: Stand by assistance(in stance at sink)  UE Bathing: Supervision  LE Bathing: Stand by assistance(cues to bathe all body parts)  UE Dressing: Supervision(required cue to correct error, initially tried to don upside down)  LE Dressing: Contact guard assistance  Toileting: Contact guard assistance  Additional Comments: Pt able to doff/don footwear c supervision.      Bed Mobility:    Supine to Sit: Supervision      Transfers:    Transfers  Stand Step Transfers: Contact guard assistance  Sit to stand: Contact guard assistance  Stand to sit: Contact guard assistance  Transfer Comments: Cues for hand placement  Toilet Transfers  Toilet - Technique: Ambulating(c SPC)  Equipment Used: Grab bars  Toilet Transfer: Contact guard assistance  Toilet Transfers Comments: Initially aligned body completely to the side of the toilet, requiring significant verbal/visual cues to correct     Shower Transfers  Shower Transfers: Not tested  [employer] Transfers Comments: Appropriate to perform in future tx sessions       Functional Mobility:    Balance  Sitting Balance: Supervision  Standing Balance: Contact guard assistance(Ranged from CGA-SBA)  Standing Balance  Time: Multiple stands ranging from 1-3 mins each  Activity: ADLs   Comment: Pt able to maintain balance c 0 UE visual/perceptual deficits, only supervision/setup goals were set for discharge. The QI, MMT, and ROM standardized assessments were used this date to determine the above performance deficits, which compromise pt's ability to safely complete ADLs/IADLs/mobility. Pt will benefit from ARU OT services to increase functional performance and return to PLOF. Decision Making: High Complexity  Clinical Presentation:  Evolving c unstable characteristics (I.e. impulsivity)  History:  See \"Social/Functional\" sections for complete Occupational Profile. Pt's co-morbidities include recent fall c rib fractures and hemothorax; arthritis, bilateral knee replacements; charting from OSH also mentioned alcohol withdrawal which all impact function and ability to progress through plan of care. Assistance/Modification:  Use of DME, providing verbal cues, providing physical assistance for mobility and ADL's, providing set-up of supplies during ADL's  Patient education:   ARU jyoti, Role of O.T., O.T. plan of care, need to scan to L visual field   []   Patient goal was established and reviewed in Rehabtracker with patient and/or family this date. Treatment Initiated:  Patient education, ADL re-training  Barriers to Improvement:  Cognitive and visual deficits  REQUIRES OT FOLLOW UP: Yes  Discharge Recommendations:  Home c continuous assist, OP OT  Equipment Recommendations:  Likely none, pt would benefit from shower chair however has clawfoot tub    Goals:     Short term goals  Time Frame for Short term goals: STGs=LTGs  Long term goals  Time Frame for Long term goals : 7-10 days or until d/c. Long term goal 1: Pt will complete grooming tasks Ind. Long term goal 2: Pt will complete total body bathing c S.  Long term goal 3: Pt will complete UB dressing c setup. Long term goal 4: Pt will complete LB dressing c setup. Long term goal 5: Pt will doff/don footwear c setup.   Long term goals 6: Pt will complete toileting

## 2020-03-12 NOTE — PROGRESS NOTES
definitive cognitive diagnoses. The KPT Practical Judgment Subtest measures judgment as a specific executive function. The subtest score indicates the patient's ability to recognize problems and think through appropriate solutions. The Total KPT score is a broader test that combines practical judgment with communication skills and visual memory. Because impairment in verbal skills and visual memory are often seen in persons with advanced dementia, the Total KPT score can be useful when there are concerns about larger questions of independent living and safety. Karlene Ware achieved a total score of 19/21    This KPT Practical Judgment Subtest score indicates  judgment. Persons with this score typically indicate if there are  problems with executive functions and could support an underlying dementia. Karlene Ware achieved a total score of 7/8     Cognitive issues observed during this assessment with ongoing evaluation needed. L neglect, reduced awareness and insight, difficulty sequencing, (phone dialing unable to be completed in order), attn, and reduced mental flexibility. Prognosis:  Speech Therapy Prognosis  Prognosis: Good  Individuals consulted  Consulted and agree with results and recommendations: Patient    Education:  Patient Education: Results and recommendations; rehab expectations  Patient Education Response: Verbalizes understanding  Safety Devices in place: Yes  Type of devices:  All fall risk precautions in place    Therapy Time:   Individual Concurrent Group Co-treatment   Time In 1000         Time Out 1040         Minutes [address] [place], [place], CCC-SLP  3/12/2020 10:41 AM

## 2020-03-12 NOTE — PROGRESS NOTES
Patient/Caregiver Education & Training, Balance Training, Gait Training    PT Individual Minutes  Time In: 7297  Time Out: 5746  Minutes: 75             Number of Minutes/Billable Intervention  PT Evaluation 15   Gait Training 30   Therapeutic Exercise    Neuro Re-Ed 15   Therapeutic Activity 15   Wheelchair Propulsion    Group    Other:    TOTAL 75       Electronically signed by:    Harmeet Walters, PT  3/12/2020, 4:30 PM

## 2020-03-12 NOTE — PROGRESS NOTES
Yariel Gusman    : 1950  Acct #: [de-identified]  MRN: [id number]              PM&R Progress Note      Admitting diagnosis: Hemorrhagic stroke (right MCA distribution IPH)     Comorbid diagnoses impacting rehabilitation: Left hemiparesis, disconjugate gaze, hypertension, hyperlipidemia, obstructive sleep apnea, gait disturbance    Chief complaint: Less dizziness, but still has hiccups. Prior (baseline) level of function: Independent. Current level of function:         Current  IRF-ANGELINA and Goals:      Prior Functioning: Everyday Activities  Self Care: Independent  Indoor Mobility (Ambulation): Independent  Stairs: Independent  Functional Cognition: Independent    Eating  Assistance Needed: Independent  CARE Score: 6     Toileting Hygiene  Assistance Needed: Independent  CARE Score: 6                Roll Left and Right  Assistance Needed: Independent  CARE Score: 6  Discharge Goal: Independent  Sit to Lying  Assistance Needed: Independent  CARE Score: 6  Discharge Goal: Independent  Sit to Stand  Assistance Needed: Supervision or touching assistance  Comment: (steady assistance)  CARE Score: 4  Discharge Goal: Independent  Chair/Bed-to-Chair Transfer  Assistance Needed: Supervision or touching assistance  CARE Score: 4  Discharge Goal: Independent     Car Transfer  Assistance Needed: Supervision or touching assistance  CARE Score: 4  Discharge Goal: Independent   Walk 10 Feet? Walk 10 Feet?: Yes  1 Step  1 Step?: Yes  Picking Up Object  Assistance Needed: Partial/moderate assistance  CARE Score: 3  Discharge Goal: Independent  Wheelchair Ability  Uses a Wheelchair and/or Scooter?: No                  I      Exam:    Blood pressure (!) 140/71, pulse 85, temperature 97.5 °F (36.4 °C), resp. rate 18, height 5' 11\" (1.803 m), weight 221 lb 6 oz (100.4 kg), SpO2 97 %. General: Up in a wheelchair. Talkative. Attends to the right and left visual field fair. In no distress. HEENT: Mild left facial droop. monitor his hemoglobin and platelet count periodically while on this medication. Weightbearing activities are pursued daily. GI prophylaxis offered. Walking short distances today. No signs of acute blood loss. 3. Hypertension: Tolerating the Norvasc for blood pressure regulation. Target systolic blood pressure is 120-140. Vital signs are checked at rest and with activity and consistent oral intake is encouraged. 4. Hyperlipidemia: Lipitor 40 mg nightly. General diet at this time. 5. Obstructive sleep apnea: Patient does not have an order for BiPAP at this time. Further investigation to follow. 6. Hiccups: Patient has significant dyspepsia and hiccups pain. He may need to receive Thorazine here.   Working with him to make bland food choices.

## 2020-03-13 LAB
GLUCOSE BLD-MCNC: 112 MG/DL (ref 70–99)
GLUCOSE BLD-MCNC: 112 MG/DL (ref 70–99)
GLUCOSE BLD-MCNC: 124 MG/DL (ref 70–99)
GLUCOSE BLD-MCNC: 127 MG/DL (ref 70–99)

## 2020-03-13 PROCEDURE — 6360000002 HC RX W HCPCS: Performed by: PHYSICAL MEDICINE & REHABILITATION

## 2020-03-13 PROCEDURE — 1280000000 HC REHAB R&B

## 2020-03-13 PROCEDURE — 97129 THER IVNTJ 1ST 15 MIN: CPT

## 2020-03-13 PROCEDURE — 94150 VITAL CAPACITY TEST: CPT

## 2020-03-13 PROCEDURE — 6370000000 HC RX 637 (ALT 250 FOR IP): Performed by: PHYSICAL MEDICINE & REHABILITATION

## 2020-03-13 PROCEDURE — 97530 THERAPEUTIC ACTIVITIES: CPT

## 2020-03-13 PROCEDURE — 97110 THERAPEUTIC EXERCISES: CPT

## 2020-03-13 PROCEDURE — 97150 GROUP THERAPEUTIC PROCEDURES: CPT

## 2020-03-13 PROCEDURE — 82962 GLUCOSE BLOOD TEST: CPT

## 2020-03-13 PROCEDURE — 99232 SBSQ HOSP IP/OBS MODERATE 35: CPT | Performed by: PHYSICAL MEDICINE & REHABILITATION

## 2020-03-13 PROCEDURE — 97130 THER IVNTJ EA ADDL 15 MIN: CPT

## 2020-03-13 PROCEDURE — 97116 GAIT TRAINING THERAPY: CPT

## 2020-03-13 RX ORDER — GABAPENTIN 100 MG/1
100 CAPSULE ORAL 3 TIMES DAILY
Status: DISCONTINUED | OUTPATIENT
Start: 2020-03-13 | End: 2020-03-17 | Stop reason: HOSPADM

## 2020-03-13 RX ORDER — FOLIC ACID 1 MG/1
1 TABLET ORAL DAILY
Status: DISCONTINUED | OUTPATIENT
Start: 2020-03-13 | End: 2020-03-17 | Stop reason: HOSPADM

## 2020-03-13 RX ORDER — CHLORPROMAZINE HYDROCHLORIDE 25 MG/1
25 TABLET, FILM COATED ORAL 3 TIMES DAILY PRN
Status: DISCONTINUED | OUTPATIENT
Start: 2020-03-13 | End: 2020-03-17

## 2020-03-13 RX ORDER — DOCUSATE SODIUM 100 MG/1
100 CAPSULE, LIQUID FILLED ORAL 2 TIMES DAILY
Status: DISCONTINUED | OUTPATIENT
Start: 2020-03-13 | End: 2020-03-17 | Stop reason: HOSPADM

## 2020-03-13 RX ADMIN — GABAPENTIN 100 MG: 100 CAPSULE ORAL at 15:03

## 2020-03-13 RX ADMIN — HEPARIN SODIUM 5000 UNITS: 5000 INJECTION INTRAVENOUS; SUBCUTANEOUS at 06:28

## 2020-03-13 RX ADMIN — ATORVASTATIN CALCIUM 40 MG: 40 TABLET, FILM COATED ORAL at 20:32

## 2020-03-13 RX ADMIN — AZITHROMYCIN 500 MG: 250 TABLET, FILM COATED ORAL at 10:13

## 2020-03-13 RX ADMIN — HEPARIN SODIUM 5000 UNITS: 5000 INJECTION INTRAVENOUS; SUBCUTANEOUS at 20:36

## 2020-03-13 RX ADMIN — FOLIC ACID 1 MG: 1 TABLET ORAL at 15:03

## 2020-03-13 RX ADMIN — GABAPENTIN 100 MG: 100 CAPSULE ORAL at 20:32

## 2020-03-13 RX ADMIN — CHLORPROMAZINE HYDROCHLORIDE 25 MG: 25 TABLET, SUGAR COATED ORAL at 15:03

## 2020-03-13 RX ADMIN — CITALOPRAM HYDROBROMIDE 10 MG: 20 TABLET ORAL at 10:13

## 2020-03-13 RX ADMIN — AMLODIPINE BESYLATE 10 MG: 10 TABLET ORAL at 10:13

## 2020-03-13 RX ADMIN — Medication 100 MG: at 10:13

## 2020-03-13 RX ADMIN — CITALOPRAM HYDROBROMIDE 10 MG: 20 TABLET ORAL at 20:32

## 2020-03-13 RX ADMIN — HEPARIN SODIUM 5000 UNITS: 5000 INJECTION INTRAVENOUS; SUBCUTANEOUS at 15:03

## 2020-03-13 ASSESSMENT — PAIN SCALES - GENERAL
PAINLEVEL_OUTOF10: 0
PAINLEVEL_OUTOF10: 0

## 2020-03-13 NOTE — PROGRESS NOTES
Domi Lozada    : 1950  Acct #: [de-identified]  MRN: [id number]              PM&R Progress Note      Admitting diagnosis: Hemorrhagic stroke (right MCA distribution IPH)     Comorbid diagnoses impacting rehabilitation: Left hemiparesis, disconjugate gaze, hypertension, hyperlipidemia, obstructive sleep apnea, gait disturbance    Chief complaint: Poor sleep and painful hiccups. Prior (baseline) level of function: Independent. Current level of function:         Current  IRF-ANGELINA and Goals:   Hearing, Speech, and Vision  Expression of Ideas and Wants: Some difficulty  Understanding Verbal and Non-Verbal Content: Understands  Prior Functioning: Everyday Activities  Self Care: Independent  Indoor Mobility (Ambulation):  Independent  Stairs: Independent  Functional Cognition: Independent  Prior Device Use: None of the given options(No AD)    Eating  Assistance Needed: Setup or clean-up assistance  Comment: did not initiate eating meal tray even with cues, required therapist handing him fork to begin eating  CARE Score: 5  Discharge Goal: Independent  Oral Hygiene  Assistance Needed: Supervision or touching assistance  CARE Score: 4  Discharge Goal: Independent  Toileting Hygiene  Assistance Needed: Supervision or touching assistance  Comment: 4 usual per Nsg  CARE Score: 4  Discharge Goal: Supervision or touching assistance  Shower/Bathe Self  Assistance Needed: Supervision or touching assistance  CARE Score: 4  Discharge Goal: Supervision or touching assistance  Upper Body Dressing  Assistance Needed: Supervision or touching assistance  CARE Score: 4  Discharge Goal: Set-up or clean-up assistance  Lower Body Dressing  Assistance Needed: Supervision or touching assistance  CARE Score: 4  Discharge Goal: Set-up or clean-up assistance  Putting On/Taking Off Footwear  Assistance Needed: Supervision or touching assistance  CARE Score: 4  Discharge Goal: Set-up or clean-up assistance    Roll Left and Right  Assistance Needed: Independent  CARE Score: 6  Discharge Goal: Independent  Sit to Lying  Assistance Needed: Independent  CARE Score: 6  Discharge Goal: Independent  Sit to Stand  Assistance Needed: Supervision or touching assistance  Comment: (steady assistance)  CARE Score: 4  Discharge Goal: Independent  Chair/Bed-to-Chair Transfer  Assistance Needed: Supervision or touching assistance  CARE Score: 4  Discharge Goal: Independent  Toilet Transfer  Assistance Needed: Supervision or touching assistance  CARE Score: 4  Discharge Goal: Supervision or touching assistance  Car Transfer  Assistance Needed: Supervision or touching assistance  CARE Score: 4  Discharge Goal: Independent   Walk 10 Feet? Walk 10 Feet?: Yes  1 Step  1 Step?: Yes  Picking Up Object  Assistance Needed: Partial/moderate assistance  CARE Score: 3  Discharge Goal: Independent  Wheelchair Ability  Uses a Wheelchair and/or Scooter?: No                  I      Exam:    Blood pressure 139/70, pulse 67, temperature 97.9 °F (36.6 °C), temperature source Oral, resp. rate 17, height 5' 11\" (1.803 m), weight 221 lb 6 oz (100.4 kg), SpO2 95 %. General: Lying back in bed. Easily awakened from a nap. In no distress. Oriented x3. HEENT: Mild left facial droop. Conjugate gaze. Moist mucous membranes. Pulmonary: Clear and unlabored. Cardiac: No murmur. Rate is controlled. Abdomen: Patient's abdomen is soft and nondistended. Bowel sounds were present throughout. There was no rebound, guarding or masses noted. Upper extremities: Moves both upper limbs though functional range of motion. Weaker  on the left than right. Increased tone throughout the left upper limb. Lower extremities: Clumsy movements of the left knee and ankle. No peripheral edema. Heels are clear. No bruising. Sitting balance was fair+.   Standing balance was fair-.    Lab Results   Component Value Date    WBC 6.3 03/02/2020    HGB 13.3 (L) 03/02/2020 HCT 45.2 03/02/2020    MCV 81.4 03/02/2020     03/02/2020     Lab Results   Component Value Date    INR 0.92 03/02/2020    INR 1.35 02/13/2019    INR 0.93 01/16/2019    PROTIME 11.1 (L) 03/02/2020    PROTIME 15.4 (H) 02/13/2019    PROTIME 10.6 01/16/2019     Lab Results   Component Value Date    CREATININE 0.8 (L) 03/02/2020    BUN 10 03/02/2020     03/02/2020    K 4.0 03/02/2020    CL 97 (L) 03/02/2020    CO2 28 03/02/2020     Lab Results   Component Value Date    ALT 20 03/02/2020    AST 23 03/02/2020    ALKPHOS 73 03/02/2020    BILITOT 0.4 03/02/2020       Expected length of stay  prior to a supervised level of function for discharge home with a walker and C OT/PT is 2 weeks. Recommendations:    1. Hemorrhagic stroke (right IPH) with gait disturbance:  Excellent engagement in his daily occupational and physical therapy with speech and language pathology.    Aggressive blood pressure control. Continue the statin.  He requires DVT prophylaxis and comes to us with orders for heparin.    Ongoing adaptive equipment training, pulmonary hygiene, bowel and bladder retraining and nutritional support. We have started a home medication of gabapentin 100 mg 3 times a day for neuropathy symptoms. 2. DVT prophylaxis: Heparin 5000 units every 8 hours.  I must monitor his hemoglobin and platelet count periodically while on this medication.  Weightbearing activities are pursued daily.  GI prophylaxis offered. Walked several 100 feet today. No new bruising or swelling. 3. Hypertension: Tolerating the Norvasc for blood pressure regulation.  Target systolic blood pressure is 120-140.  Vital signs are checked at rest and with activity and consistent oral intake is encouraged. Blood pressure is in target range at this time. Oral intake is fairly consistent. 4. Hyperlipidemia: Lipitor 40 mg nightly.  General diet at this time.   5. Obstructive sleep apnea: Patient does not have an order for BiPAP at

## 2020-03-13 NOTE — PROGRESS NOTES
nearby      Date of Admit: 3/11/2020  Room #: 1017/1017-A     ST Number of Minutes/Billable Intervention  Cog/Memory Deficits 30    Aphasia/Language     Dysarthria/Speech     Apraxia/Speech     Dysphagia/Swallowing     Group     Other    TOTAL Minutes Billed  30              Date: 3/13/2020  Day of ARU Week:  3       SLP Individual Minutes  Time In: 3180  Time Out: 0443  Minutes: 30     Variance/Reason:  [] Refusal due to   [] Medical hold/reason  [] Illness   [] Off Unit for test/procedure  [] Extra time needed to complete task  [] Other (specify)    Activity completed: Functional problem solving and planning tasks with L scan target. Pain: Denies  Current Diet: DIET GENERAL;  Dietary Nutrition Supplements: Low Calorie High Protein Supplement  Subjective: Pt sitting in community area interacting with other patients, move to room for treatment, pleasant and cooperative. Goals and POC: Co-treats where appropriate with PT or OT to facilitate patient goals in functional tasks. LTG                           Short-term Goals  Timeframe for Short-term Goals: 3x/week x2 weeks for 30 mins min  LTG: Improve cognition and memory for safe return home. Goal 1: Pt will complete cog assessment by 3/16/2020  Goal 2: Pt will improve L visual scanning for fx tasks and med mgmt with min cues 90% acc. Pt completed pen and paper tasks with attention and interest. Pt with need for visual color marker on left side with mod-max visual cues for scanning to edge of paper/beginning of text. 60% accuracy. Need for break down of information into 1-2 details with combined planning/sequencing. Continued difficulty with insight regarding difficulty. Goal 3: Pt will improve problem solving and memory for fx and verbal tasks with min cues with 90% acc. Pt participated in functional problem solving task related to calendar development and planning.  Pt with need for max verbal min visual cues for sequencing following referral to direct

## 2020-03-13 NOTE — PROGRESS NOTES
Physical Therapy  [x] daily progress note       [] discharge       Patient Name:  Rudy Slater   :  1950 MRN: [id number]  Room:  [address] [place], [place] [address] Date of Admission: 3/11/2020  Rehabilitation Diagnosis:   CVA, old, disturbances of vision [I69.398, H53.9]       Date 3/13/2020       Day of ARU Week:  3   Time IN/OUT 6959-3092   Individual Tx Minutes 60   Group Tx Minutes    Co-Treat Minutes    Concurrent Tx Minutes    TOTAL Tx Time Mins 60   Variance Time    Variance Time []   Refusal due to:     []   Medical hold/reason:    []   Illness   []   Off Unit for test/procedure  []   Extra time needed to complete task  []   Therapeutic need  []   Other (specify):   Restrictions Restrictions/Precautions  Restrictions/Precautions: General Precautions, Fall Risk(L neglect, impulsive)      Communication with other providers: [x]   OK to see per nursing:     []   Spoke with team member regarding:      Subjective observations and cognitive status:  Pt laying in bed with HOB elevated 35 degrees, agreeable to therapy. Pain level/location:   No pain at this time but has been struggling with some hiccups. Discharge recommendations  Anticipated discharge date:  TBD  Destination: []home alone   []home alone with assist PRN     [] home w/ family      [] Continuous supervision  []SNF    [] Assisted living     [] Other:   Continued therapy: []HHC PT  []OUTPATIENT  PT   [] No Further PT  Equipment needs: TBD     Bed Mobility:           []   Pt received out of bed   Rolling R/L:  Mod I  Scooting:   Mod I  Lying --> Sit:  supervision  Sit --> lying:  supervison  Good sitting balance    Transfers:    Sit--> Stand:  SBA  Stand --> Sit:   SBA  Chair-->Bed/Bed --> Chair:   CGA  Car Transfers:  CGA  Toilet Transfer (if applicable): SBA   Assistive device required for transfer:   Straight Cane, no device, grab bars    Gait:    Distance:  360', 175', 190'   Assistance:  CGA-min A  Device:  Straight cane  Gait Quality:  Pt demo's good

## 2020-03-13 NOTE — PROGRESS NOTES
Occupational Therapy   Physical Rehabilitation: OCCUPATIONAL THERAPY     [x] daily progress note       [] discharge       Patient Name:  Chirag Ball   :  1950 MRN: [id number]  Room:  [address] [place], [place] [address] Date of Admission: 3/11/2020    Rehabilitation Diagnosis:     CVA, old, disturbances of vision [I69.398, H53.9]    Social History  Social/Functional History  Lives With: Alone  Type of Home: House  Home Layout: Multi-level, Laundry in basement, Bed/Bath upstairs(Full flight of stairs to each level; both flights have L ascending rail)  Home Access: Stairs to enter without rails  Entrance Stairs - Number of Steps: 2 ISRA[place]   Entrance Stairs - Rails: None  Bathroom Shower/Tub: Tub/Shower unit([place])  Bathroom Toilet: Standard  Bathroom Equipment: Grab bars in shower, Grab bars around toilet  Bathroom Accessibility: Walker accessible  Home Equipment: Rolling walker, Cane  ADL Assistance: Independent  Homemaking Assistance: Independent  Homemaking Responsibilities: Yes  Meal Prep Responsibility: Primary(Sometimes will go to sister's to eat)  Laundry Responsibility: Primary  Cleaning Responsibility: Primary  Bill Paying/Finance Responsibility: Primary  Shopping Responsibility: Primary  Dependent Care Responsibility: Primary(Has retired therapy dog Starlet Socks))  Health Care Management: Primary  Ambulation Assistance: Independent(No AD)  Transfer Assistance: Independent  Active : Yes  Mode of Transportation: [facility]  Occupation: Retired  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 Additional Comments: Pt typically sleeps in a flat, regular bed at home. Pt reports one fall in the past year (per charting occurred on  and caused rib fractures and hemothorax).   Pt reports he has 2 sisters that live nearby    Objective Goals:  (Update in navigator)    Short term goals  Time Frame for Short term goals: STGs=LTGs :  Long term goals  Time Frame for Long term goals : 7-10 days or until d/c. Long term goal 1: Pt will complete grooming tasks Ind. Long term goal 2: Pt will complete total body bathing c S.  Long term goal 3: Pt will complete UB dressing c setup. Long term goal 4: Pt will complete LB dressing c setup. Long term goal 5: Pt will doff/don footwear c setup. Long term goals 6: Pt will complete toileting c supervision. Long term goal 7: Pt will complete functional transfers (bed, chair, toilet, shower) c DME PRN and S.  Long term goal 8: Pt will perform therex/therax to facilitate increased strength/endurance/ax tolerance (c emphasis on dynamic standing balance/tolerance >10 mins, cognitive retraining, and visual perceptual retraining) c SBA. Long term goal 9: Pt will complete simple homemaking tasks c DME PRN and S. :    Plan of Care:  Pt to be seen at least  5x per week for a minimum of 60 minutes as appropriate. Date    FRI 3/13/2020   TIMES IN/OUT   [id number]   Group Tx Minutes 60   TOTAL Tx time seen 60   Variance Time    Variance Reason    [] Refusal due to   [] Medical hold/reason  [] Illness   [] Off Unit for test/procedure  [] Extra time needed to complete task  [] Other (specify)   Restrictions/Precautions Restrictions/Precautions  Restrictions/Precautions: General Precautions, Fall Risk(L neglect, impulsive)         Current Diet/Swallowing Issues DIET GENERAL;  Dietary Nutrition Supplements: Low Calorie High Protein Supplement   Communication with other providers: [x] Ok to see per nursing at morning huddle  [] Medical hold and reason  [] Spoke with (team    member) regarding   Subjective observations: Pt agreeable to group session. Pain level/location:    Alarm placed/where?   Fall Risk  [] Pt taken to DR for lunch with nsg present   []

## 2020-03-13 NOTE — PROGRESS NOTES
Physical Rehabilitation: OCCUPATIONAL THERAPY     [x] daily progress note       [] discharge       Patient Name:  Karlene Ware   :  1950 MRN: [id number]  Room:  [address] [place] [address] Date of Admission: 3/11/2020  Rehabilitation Diagnosis:   CVA, old, disturbances of vision [I69.398, H53.9]       Date 3/13/2020       Day of ARU Week:  3   Time IN/OUT 0930/1000   Individual Tx Minutes 30   Group Tx Minutes    Co-Treat Minutes    Concurrent Tx Minutes    TOTAL Tx Time Mins 30   Variance Time    Variance Time []   Refusal due to:     []   Medical hold/reason:    []   Illness   []   Off Unit for test/procedure  []   Extra time needed to complete task  []   Therapeutic need  []   Other (specify):   Restrictions Restrictions/Precautions: General Precautions, Fall Risk(L neglect, impulsive)         Communication with other providers: [x]   OK to see per nursing:     []   Spoke with team member regarding:      Subjective observations and cognitive status:      Pain level/location:    /10       Location:    Discharge recommendations  Anticipated discharge date:  TBD  Destination: [x]home alone   []home alone w assist prn   [] home w/ family    [] Continuous supervision       []SNF    [] Assisted living     [] Other:   Continued therapy: [x]HHC OT  []OUTPATIENT  OT   [] No Further OT  Equipment needs:TBD     TOILETING: Sup  Toilet transfer Sup      Bed Mobility:           []   Pt received out of bed   Rolling R/L: sup  Scooting:  sup  Supine --> Sit: sup  Sit --> Supine:  sup    Transfers:    Sit--> Stand:  Sup  Stand --> Sit:   Sup  Stand-Pivot:   SBA  Other:    Assistive device required for transfer:   Straight cane      Functional Mobility:  Around therapy gym to get water and back to chair  And throughout room upon arriving after going to the therapy gym to bathroom and back to the bed with cane   Assistance:  SBA/CGA  Device:   []   Rolling Walker     []   Standard Walker []   Wheelchair        [x]   [employer]       [] Neuro Re-Ed    Therapeutic Activity    Group    Other:    TOTAL 30       Social History  Social/Functional History  Lives With: Alone  Type of Home: House  Home Layout: Multi-level, Laundry in basement, Bed/Bath upstairs(Full flight of stairs to each level; both flights have L ascending rail)  Home Access: Stairs to enter without rails  Entrance Stairs - Number of Steps: 2 ISRA[place]   Entrance Stairs - Rails: None  Bathroom Shower/Tub: Tub/Shower unit([place])  Bathroom Toilet: Standard  Bathroom Equipment: Grab bars in shower, Grab bars around toilet  Bathroom Accessibility: Walker accessible  Home Equipment: Rolling walker, Cane  ADL Assistance: Independent  Homemaking Assistance: Independent  Homemaking Responsibilities: Yes  Meal Prep Responsibility: Primary(Sometimes will go to sister's to eat)  Laundry Responsibility: Primary  Cleaning Responsibility: Primary  Bill Paying/Finance Responsibility: Primary  Shopping Responsibility: Primary  Dependent Care Responsibility: Primary(Has retired therapy dog Tegan Escobar)  Health Care Management: Primary  Ambulation Assistance: Independent(No AD)  Transfer Assistance: Independent  Active : Yes  Mode of Transportation: [employer]  Occupation: Retired  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 Additional Comments: Pt typically sleeps in a flat, regular bed at home. Pt reports one fall in the past year (per charting occurred on 01/18 and caused rib fractures and hemothorax). Pt reports he has 2 sisters that live nearby    Objective                                                                                    Goals:  (Update in navigator)  Short term goals  Time Frame for Short term goals: STGs=LTGs:  Long term goals  Time Frame for Long term goals : 7-10 days or until d/c.    Long term goal 1: Pt will complete grooming tasks Ind. Long term goal 2: Pt will complete total body bathing c S.  Long term goal 3: Pt will complete UB dressing c setup. Long term goal 4: Pt will complete LB dressing c setup. Long term goal 5: Pt will doff/don footwear c setup. Long term goals 6: Pt will complete toileting c supervision. Long term goal 7: Pt will complete functional transfers (bed, chair, toilet, shower) c DME PRN and S.  Long term goal 8: Pt will perform therex/therax to facilitate increased strength/endurance/ax tolerance (c emphasis on dynamic standing balance/tolerance >10 mins, cognitive retraining, and visual perceptual retraining) c SBA. Long term goal 9: Pt will complete simple homemaking tasks c DME PRN and S.:        Plan of Care                                                                              Times per week: 5 days per week for a minimum of 60 minutes/day plus group as appropriate for 60 minutes.   Treatment to include Plan  Times per day: Daily  Current Treatment Recommendations: Balance Training, Functional Mobility Training, Endurance Training, Safety Education & Training, Patient/Caregiver Education & Training, Equipment Evaluation, Education, & procurement, Self-Care / ADL, Home Management Training, Cognitive/Perceptual Training    Electronically signed by   HENOK Juarez  3/13/2020, 9:35 AM

## 2020-03-14 PROBLEM — I61.9 STROKE DUE TO INTRACEREBRAL HEMORRHAGE (HCC): Status: ACTIVE | Noted: 2020-03-14

## 2020-03-14 LAB — GLUCOSE BLD-MCNC: 126 MG/DL (ref 70–99)

## 2020-03-14 PROCEDURE — 97530 THERAPEUTIC ACTIVITIES: CPT

## 2020-03-14 PROCEDURE — 97116 GAIT TRAINING THERAPY: CPT

## 2020-03-14 PROCEDURE — 97110 THERAPEUTIC EXERCISES: CPT

## 2020-03-14 PROCEDURE — 96125 COGNITIVE TEST BY HC PRO: CPT

## 2020-03-14 PROCEDURE — 94761 N-INVAS EAR/PLS OXIMETRY MLT: CPT

## 2020-03-14 PROCEDURE — 6370000000 HC RX 637 (ALT 250 FOR IP): Performed by: PHYSICAL MEDICINE & REHABILITATION

## 2020-03-14 PROCEDURE — 6360000002 HC RX W HCPCS: Performed by: PHYSICAL MEDICINE & REHABILITATION

## 2020-03-14 PROCEDURE — 97129 THER IVNTJ 1ST 15 MIN: CPT

## 2020-03-14 PROCEDURE — 82962 GLUCOSE BLOOD TEST: CPT

## 2020-03-14 PROCEDURE — 97535 SELF CARE MNGMENT TRAINING: CPT

## 2020-03-14 PROCEDURE — 1280000000 HC REHAB R&B

## 2020-03-14 RX ADMIN — GABAPENTIN 100 MG: 100 CAPSULE ORAL at 13:38

## 2020-03-14 RX ADMIN — HEPARIN SODIUM 5000 UNITS: 5000 INJECTION INTRAVENOUS; SUBCUTANEOUS at 20:44

## 2020-03-14 RX ADMIN — AZITHROMYCIN 500 MG: 250 TABLET, FILM COATED ORAL at 08:13

## 2020-03-14 RX ADMIN — CITALOPRAM HYDROBROMIDE 10 MG: 20 TABLET ORAL at 08:15

## 2020-03-14 RX ADMIN — PANTOPRAZOLE SODIUM 40 MG: 40 TABLET, DELAYED RELEASE ORAL at 08:14

## 2020-03-14 RX ADMIN — GABAPENTIN 100 MG: 100 CAPSULE ORAL at 20:43

## 2020-03-14 RX ADMIN — CITALOPRAM HYDROBROMIDE 10 MG: 20 TABLET ORAL at 20:44

## 2020-03-14 RX ADMIN — HEPARIN SODIUM 5000 UNITS: 5000 INJECTION INTRAVENOUS; SUBCUTANEOUS at 05:37

## 2020-03-14 RX ADMIN — Medication 100 MG: at 08:14

## 2020-03-14 RX ADMIN — FOLIC ACID 1 MG: 1 TABLET ORAL at 08:15

## 2020-03-14 RX ADMIN — GABAPENTIN 100 MG: 100 CAPSULE ORAL at 08:15

## 2020-03-14 RX ADMIN — HEPARIN SODIUM 5000 UNITS: 5000 INJECTION INTRAVENOUS; SUBCUTANEOUS at 13:38

## 2020-03-14 RX ADMIN — AMLODIPINE BESYLATE 10 MG: 10 TABLET ORAL at 08:14

## 2020-03-14 RX ADMIN — ATORVASTATIN CALCIUM 40 MG: 40 TABLET, FILM COATED ORAL at 20:43

## 2020-03-14 ASSESSMENT — PAIN SCALES - GENERAL: PAINLEVEL_OUTOF10: 0

## 2020-03-14 NOTE — PLAN OF CARE
Problem: Safety:  Goal: Free from accidental physical injury  Description: Free from accidental physical injury  3/14/2020 0015 by Rebecca Lyn LPN  Outcome: Ongoing  3/13/2020 1137 by Hilario Fischer. Sally Raygoza RN  Outcome: Ongoing  3/13/2020 1131 by Hilario Fischer. Sally Raygoza RN  Outcome: Ongoing     Problem: Safety:  Goal: Free from intentional harm  Description: Free from intentional harm  3/14/2020 0015 by Rebecca Lyn LPN  Outcome: Ongoing  3/13/2020 1137 by Hilario Fischer. Sally Raygoza RN  Outcome: Ongoing  3/13/2020 1131 by Hilario Fischer.  Sally Raygoza RN  Outcome: Ongoing

## 2020-03-14 NOTE — PROGRESS NOTES
Speech Language Pathology  [facility] UNIT  SPEECH/LANGUAGE PATHOLOGY      [x] Daily           [] Discharge    Patient:Clau Mcneill      TEL:[birth year]  JLF:[id number]  Rehab Dx/Hx: CVA, old, disturbances of vision [I69.398, H53.9]   No Known Allergies  Precautions:  Restrictions/Precautions: General Precautions, Fall Risk(L neglect, impulsive)          Home Situation/IADL:   Social/Functional History  Lives With: Alone  Type of Home: House  Home Layout: Multi-level, Laundry in basement, Bed/Bath upstairs(Full flight of stairs to each level; both flights have L ascending rail)  Home Access: Stairs to enter without rails  Entrance Stairs - Number of Steps: 2 [place]   Entrance Stairs - Rails: None  Bathroom Shower/Tub: Tub/Shower unit([place])  Bathroom Toilet: Standard  Bathroom Equipment: Grab bars in shower, Grab bars around toilet  Bathroom Accessibility: Walker accessible  Home Equipment: Rolling walker, Cane  ADL Assistance: Independent  Homemaking Assistance: Independent  Homemaking Responsibilities: Yes  Meal Prep Responsibility: Primary(Sometimes will go to sister's to eat)  Laundry Responsibility: Primary  Cleaning Responsibility: Primary  Bill Paying/Finance Responsibility: Primary  Shopping Responsibility: Primary  Dependent Care Responsibility: Primary(Has retired therapy dog Mary Magaña))  Health Care Management: Primary  Ambulation Assistance: Independent(No AD)  Transfer Assistance: Independent  Active : Yes  Mode of Transportation: [facility]  Occupation: Retired  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 Additional Comments: Pt typically sleeps in a flat, regular bed at home. Pt reports one fall in the past year (per charting occurred on 01/18 and caused rib fractures and hemothorax).   Pt

## 2020-03-14 NOTE — PROGRESS NOTES
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, H53.9]       Date 3/14/2020       Day of ARU Week:  4   Time IN/OUT 7715-2323   Individual Tx Minutes 60   Group Tx Minutes    Co-Treat Minutes    Concurrent Tx Minutes    TOTAL Tx Time Mins 60   Variance Time    Variance Time []   Refusal due to:     []   Medical hold/reason:    []   Illness   []   Off Unit for test/procedure  []   Extra time needed to complete task  []   Therapeutic need  []   Other (specify):   Restrictions Restrictions/Precautions  Restrictions/Precautions: General Precautions, Fall Risk(L neglect, impulsive)      Communication with other providers: [x]   OK to see per nursing:     []   Spoke with team member regarding:      Subjective observations and cognitive status: Pt states he wants some hard candy so he doesn't drink so much. Pain level/location:  0  /10       Location: none reported   Discharge recommendations  Anticipated discharge date:  TBD  Destination: []home alone   []home alone with assist PRN     [] home w/ family      [] Continuous supervision  []SNF    [] Assisted living     [] Other:   Continued therapy: []HHC PT  []OUTPATIENT  PT   [] No Further PT  Equipment needs: TBD     Bed Mobility:           [x]   Pt received out of bed     Transfers:    Sit--> Stand:  SBA  Stand --> Sit:   SBA  Other:    Assistive device required for transfer:   [facility]    Gait:    Distance:427' +275'+235'   Assistance:  CGA  Device:  SPC  Gait Quality:  Pt displayed some L side neglect, increase morena, decrease foot clearance.     Stairs   # Completed:  12  Assistance:  CGA  Supportive Device:  B rail    Additional Therapeutic activities/exercises completed this date:     [x]   Nu-step:  Time: 8:49     Level:  4       #Steps:   403    []   Rebounder: Rails: None  Bathroom Shower/Tub: Tub/Shower unit(Clawfoot)  Bathroom Toilet: Standard  Bathroom Equipment: Grab bars in shower, Grab bars around toilet  Bathroom Accessibility: Walker accessible  Home Equipment: Rolling walker, Cane  ADL Assistance: Independent  Homemaking Assistance: Independent  Homemaking Responsibilities: Yes  Meal Prep Responsibility: Primary(Sometimes will go to sister's to eat)  Laundry Responsibility: Primary  Cleaning Responsibility: Primary  Bill Paying/Finance Responsibility: Primary  Shopping Responsibility: Primary  Dependent Care Responsibility: Primary(Has retired therapy dog Yuri Guzman)  Health Care Management: Primary  Ambulation Assistance: Independent(No AD)  Transfer Assistance: Independent  Active : Yes  Mode of Transportation: SUV  Occupation: Retired  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 Additional Comments: Pt typically sleeps in a flat, regular bed at home. Pt reports one fall in the past year (per charting occurred on 01/18 and caused rib fractures and hemothorax). Pt reports he has 2 sisters that live nearby    Objective                                                                                    Goals:  (Update in navigator)  Short term goals  Time Frame for Short term goals: 7 days  Short term goal 1: Pt will perform transfers for sit to stand, pivot and car with mod I  Short term goal 2: Pt will ambulate 300' with straight cane on level surfaces with mod I   Short term goal 3: Pt will demonstrate 20' gait on unlevel surface with supervision with straight cane  Short term goal 4: Pt will ascend/descend curb and flight of stairs with supervision using cane and grab bars as needed.    Short term goal 5: Pt will demonstrate ability to  light object from floor with mod I:   : Plan of Care                                                                              Times per week: 5 days per week for a minimum of 60 minutes/day plus group as appropriate for 60 minutes.   Treatment to include Current Treatment Recommendations: Functional Mobility Training, Neuromuscular Re-education, Home Exercise Program, Equipment Evaluation, Education, & procurement, Transfer Training, Endurance Training, Stair training, Patient/Caregiver Education & Training, Balance Training, Gait Training    Electronically signed by   aKrli Rawls,  3/14/2020, 3:11 PM

## 2020-03-14 NOTE — PROGRESS NOTES
Min A with washing back and drying      Dressing:      Upper Body Dressing: Setup  Lower Body Dressing:  Setup    Footwear: Setup     Toileting:   Supervision for through [facility]         Toilet Transfers:   Supervision, VC for body positioning  Device Used:    [x]   Standard Toilet         [x]   Allison Hawley           []  Bedside Commode       []   Elevated Toilet          []   Other:        Tub/Shower Transfer:   Supervision  Device Used:    [x]   Shower Bench      []   Shower Chair      []   Tub Transfer Bench           []   Bathtub    []   Shower         []   Other:         Bed Mobility:           []   Pt received out of bed   Rolling R/L:  Ind  Scooting:    Supine --> Sit:  Ind  Sit --> Supine:      Transfers:    Sit--> Stand: Mod I   Stand --> Sit:   Mod I  Stand-Pivot:     Other:    Assistive device required for transfer:          Functional Mobility:   Bed<>bathroom<>chair  Assistance:  Supervision  Device:   []   Bryn Headings     []   Standard Walker []   Wheelchair        []   [employer]       []   Walker Prudent         []   Cardiac Judieth Ozzie       []   Other:        Homemaking Tasks: Additional Therapeutic activities/exercises completed this date:     [x]   ADL Training   [x]   Balance/Postural training     [x]   Bed/Transfer Training   []   Endurance Training   []   Neuromuscular Re-ed   []   Nu-step:  Time:        Level:         #Steps:       []   Rebounder:    []  Seated     []  Standing        []   Supine Ther Ex (reps/sets):     [x]   Seated Ther Ex (reps/sets):   BUE 15 reps x1 set utilizing red theraband targeting all planes of motion   []   Standing Ther Ex (reps/sets):     []   Other:      Comments:  Require intermittent verbal/visual for corrected techs and pace. Reported discomfort B gh jt after theraband ex. .    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 Additional Comments: Pt typically sleeps in a flat, regular bed at home. Pt reports one fall in the past year (per charting occurred on 01/18 and caused rib fractures and hemothorax). Pt reports he has 2 sisters that live nearby    Objective                                                                                    Goals:  (Update in navigator)  Short term goals  Time Frame for Short term goals: STGs=LTGs:  Long term goals  Time Frame for Long term goals : 7-10 days or until d/c. Long term goal 1: Pt will complete grooming tasks Ind. Long term goal 2: Pt will complete total body bathing c S.  Long term goal 3: Pt will complete UB dressing c setup. Long term goal 4: Pt will complete LB dressing c setup. Long term goal 5: Pt will doff/don footwear c setup. Long term goals 6: Pt will complete toileting c supervision. Long term goal 7: Pt will complete functional transfers (bed, chair, toilet, shower) c DME PRN and S.  Long term goal 8: Pt will perform therex/therax to facilitate increased strength/endurance/ax tolerance (c emphasis on dynamic standing balance/tolerance >10 mins, cognitive retraining, and visual perceptual retraining) c SBA.   Long term goal 9: Pt will complete simple homemaking tasks c DME PRN and S.:        Plan of Care                                                                              Times per week: 5 days per week for a minimum of 60 minutes/day plus group as appropriate for 60 minutes.   Treatment to include Plan  Times per day: Daily  Current Treatment Recommendations: Balance Training, Functional Mobility Training, Endurance Training, Safety Education & Training, Patient/Caregiver Education & Training, Equipment Evaluation, Education, & procurement, Self-Care / ADL, Home Management Training, Cognitive/Perceptual Training    Electronically signed by   HENOK Lyon  3/14/2020, 10:35 AM

## 2020-03-15 LAB — GLUCOSE BLD-MCNC: 94 MG/DL (ref 70–99)

## 2020-03-15 PROCEDURE — 6370000000 HC RX 637 (ALT 250 FOR IP): Performed by: PHYSICAL MEDICINE & REHABILITATION

## 2020-03-15 PROCEDURE — 82962 GLUCOSE BLOOD TEST: CPT

## 2020-03-15 PROCEDURE — 1280000000 HC REHAB R&B

## 2020-03-15 PROCEDURE — 6360000002 HC RX W HCPCS: Performed by: PHYSICAL MEDICINE & REHABILITATION

## 2020-03-15 PROCEDURE — 94761 N-INVAS EAR/PLS OXIMETRY MLT: CPT

## 2020-03-15 RX ADMIN — DOCUSATE SODIUM 100 MG: 100 CAPSULE, LIQUID FILLED ORAL at 21:18

## 2020-03-15 RX ADMIN — GABAPENTIN 100 MG: 100 CAPSULE ORAL at 21:18

## 2020-03-15 RX ADMIN — HEPARIN SODIUM 5000 UNITS: 5000 INJECTION INTRAVENOUS; SUBCUTANEOUS at 21:20

## 2020-03-15 RX ADMIN — ATORVASTATIN CALCIUM 40 MG: 40 TABLET, FILM COATED ORAL at 21:19

## 2020-03-15 RX ADMIN — HEPARIN SODIUM 5000 UNITS: 5000 INJECTION INTRAVENOUS; SUBCUTANEOUS at 05:29

## 2020-03-15 RX ADMIN — AMLODIPINE BESYLATE 10 MG: 10 TABLET ORAL at 10:21

## 2020-03-15 RX ADMIN — CITALOPRAM HYDROBROMIDE 10 MG: 20 TABLET ORAL at 10:21

## 2020-03-15 RX ADMIN — Medication 100 MG: at 10:21

## 2020-03-15 RX ADMIN — FOLIC ACID 1 MG: 1 TABLET ORAL at 10:21

## 2020-03-15 RX ADMIN — CITALOPRAM HYDROBROMIDE 10 MG: 20 TABLET ORAL at 21:18

## 2020-03-15 RX ADMIN — AZITHROMYCIN 500 MG: 250 TABLET, FILM COATED ORAL at 10:21

## 2020-03-15 RX ADMIN — GABAPENTIN 100 MG: 100 CAPSULE ORAL at 10:21

## 2020-03-15 RX ADMIN — HEPARIN SODIUM 5000 UNITS: 5000 INJECTION INTRAVENOUS; SUBCUTANEOUS at 14:46

## 2020-03-15 RX ADMIN — GABAPENTIN 100 MG: 100 CAPSULE ORAL at 14:46

## 2020-03-15 ASSESSMENT — PAIN SCALES - GENERAL
PAINLEVEL_OUTOF10: 0

## 2020-03-16 PROCEDURE — 1280000000 HC REHAB R&B

## 2020-03-16 PROCEDURE — 97116 GAIT TRAINING THERAPY: CPT

## 2020-03-16 PROCEDURE — 97130 THER IVNTJ EA ADDL 15 MIN: CPT

## 2020-03-16 PROCEDURE — 6370000000 HC RX 637 (ALT 250 FOR IP): Performed by: PHYSICAL MEDICINE & REHABILITATION

## 2020-03-16 PROCEDURE — 97530 THERAPEUTIC ACTIVITIES: CPT

## 2020-03-16 PROCEDURE — 6360000002 HC RX W HCPCS: Performed by: PHYSICAL MEDICINE & REHABILITATION

## 2020-03-16 PROCEDURE — 97129 THER IVNTJ 1ST 15 MIN: CPT

## 2020-03-16 PROCEDURE — 97150 GROUP THERAPEUTIC PROCEDURES: CPT

## 2020-03-16 PROCEDURE — 99232 SBSQ HOSP IP/OBS MODERATE 35: CPT | Performed by: PHYSICAL MEDICINE & REHABILITATION

## 2020-03-16 RX ADMIN — CITALOPRAM HYDROBROMIDE 10 MG: 20 TABLET ORAL at 20:08

## 2020-03-16 RX ADMIN — DOCUSATE SODIUM 100 MG: 100 CAPSULE, LIQUID FILLED ORAL at 20:08

## 2020-03-16 RX ADMIN — GABAPENTIN 100 MG: 100 CAPSULE ORAL at 10:01

## 2020-03-16 RX ADMIN — ATORVASTATIN CALCIUM 40 MG: 40 TABLET, FILM COATED ORAL at 20:08

## 2020-03-16 RX ADMIN — Medication 100 MG: at 10:01

## 2020-03-16 RX ADMIN — DOCUSATE SODIUM 100 MG: 100 CAPSULE, LIQUID FILLED ORAL at 10:01

## 2020-03-16 RX ADMIN — HEPARIN SODIUM 5000 UNITS: 5000 INJECTION INTRAVENOUS; SUBCUTANEOUS at 22:27

## 2020-03-16 RX ADMIN — HEPARIN SODIUM 5000 UNITS: 5000 INJECTION INTRAVENOUS; SUBCUTANEOUS at 05:53

## 2020-03-16 RX ADMIN — AZITHROMYCIN 500 MG: 250 TABLET, FILM COATED ORAL at 10:01

## 2020-03-16 RX ADMIN — GABAPENTIN 100 MG: 100 CAPSULE ORAL at 15:18

## 2020-03-16 RX ADMIN — CITALOPRAM HYDROBROMIDE 10 MG: 20 TABLET ORAL at 10:01

## 2020-03-16 RX ADMIN — PANTOPRAZOLE SODIUM 40 MG: 40 TABLET, DELAYED RELEASE ORAL at 10:01

## 2020-03-16 RX ADMIN — FOLIC ACID 1 MG: 1 TABLET ORAL at 10:01

## 2020-03-16 RX ADMIN — AMLODIPINE BESYLATE 10 MG: 10 TABLET ORAL at 10:01

## 2020-03-16 RX ADMIN — GABAPENTIN 100 MG: 100 CAPSULE ORAL at 20:08

## 2020-03-16 ASSESSMENT — PAIN SCALES - GENERAL
PAINLEVEL_OUTOF10: 0
PAINLEVEL_OUTOF10: 0

## 2020-03-16 NOTE — FLOWSHEET NOTE
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, H53.9]       Date 3/16/2020       Day of ARU Week:  6   Time IN/OUT 7338-7207   Individual Tx Minutes 45   Group Tx Minutes    Co-Treat Minutes    Concurrent Tx Minutes    TOTAL Tx Time Mins 45   Variance Time    Variance Time []   Refusal due to:     []   Medical hold/reason:    []   Illness   []   Off Unit for test/procedure  []   Extra time needed to complete task  []   Therapeutic need  []   Other (specify):   Restrictions Restrictions/Precautions  Restrictions/Precautions: General Precautions, Fall Risk(L neglect, impulsive)      Communication with other providers: [x]   OK to see per nursing:     []   Spoke with team member regarding:      Subjective observations and cognitive status: Pt sitting up in  Recliner awake. He reports being ready to go home. \" I have 7 people at home to  Help me\".  \" I will need a couple weeks of home health care\"     Pain level/location:   0 /10       Location:    Discharge recommendations  Anticipated discharge date:  TBD  Destination: []home alone   []home alone with assist PRN     [] home w/ family      [] Continuous supervision  []SNF    [] Assisted living     [] Other:   Continued therapy: []HHC PT  []OUTPATIENT  PT   [] No Further PT  Equipment needs: Pt  Bsc, has wooden cane,      Bed Mobility:           [x]   Pt received out of bed     Transfers:    Sit--> Stand:  sba  Stand --> Sit:   sba  Chair-->Bed/Bed --> Chair:   sba  Assistive device required for transfer:   Std cane  Gait:    Distance: 525 ft, 270 ft, 165 ft  Assistance:  Sup with  Requiring vc to left side  Device:  SC  Gait Quality:  Slow/mod pace recip steps no lob noted but requires vc for change of direction    Stair  # Completed: 12  Assistance: cga  Supportive Device:  1 rail and SC    Uneven Surfaces:       Assistance:    cga  Device:    SC  Surfaces Completed:   [x]  Green mat with bean bags beneath      []  Throw rugs       []  Ramp       []  Outdoor pavements        []  Grass             []  Loose gravel        []  Other:         Additional Therapeutic activities/exercises completed this date:     []   Nu-step:  Time:        Level:         #Steps:       []   Rebounder:    []  Seated     []  Standing        []   Balance training         []   Postural training    []   Supine ther ex (reps/sets):     []   Seated ther ex (reps/sets):     []   Standing ther ex (reps/sets):     [x]   Picking up object from floor (standing):   X 6 objects of differing hts with sup and vc for  Looking left                []   Reacher used   []   Other:   []   Other:    Comments:      Patient/Caregiver Education and Training:   [x]   Bed Mobility/Transfer technique/safety  [x]   Gait technique/sequencing  [x]   Proper use of assistive device  [x]   Advanced mobility safety and technique  []   Reinforced patient's precautions/mobility while maintaining precautions  []   Postural awareness  []   Family training  []   Progress was updated and reviewed in Rehabtracker with patient and/or family this date.     Treatment Plan for Next Session: car transfer and  Stair training      Assessment:      Treatment/Activity Tolerance:   [] Tolerated treatment with no adverse effects    [] Patient limited by fatigue  [] Patient limited by pain   [] Patient limited by medical complications:    [] Adverse reaction to Tx:   [] Significant change in status    Safety:       []  bed alarm set    [x]  chair alarm set    []  Pt refused alarms                []  Telesitter activated      [x]  Gait belt used during tx session      []other:         Number of Minutes/Billable Intervention  Gait Training 45   Therapeutic Exercise    Neuro Re-Ed    Therapeutic Activity    Wheelchair Propulsion    Group    Other:    TOTAL 45         Social History  Social/Functional History  Lives With: Alone  Type of Home: House  Home Layout: Multi-level, Laundry in basement, Bed/Bath upstairs(Full flight of stairs to each level; both flights have L ascending rail)  Home Access: Stairs to enter without rails  Entrance Stairs - Number of Steps: 2 ISRAEL   Entrance Stairs - Rails: None  Bathroom Shower/Tub: Tub/Shower unit([place]foot)  Bathroom Toilet: Standard  Bathroom Equipment: Grab bars in shower, Grab bars around toilet  Bathroom Accessibility: Walker accessible  Home Equipment: Rolling walker, Cane  ADL Assistance: Independent  Homemaking Assistance: Independent  Homemaking Responsibilities: Yes  Meal Prep Responsibility: Primary(Sometimes will go to sister's to eat)  Laundry Responsibility: Primary  Cleaning Responsibility: Primary  Bill Paying/Finance Responsibility: Primary  Shopping Responsibility: Primary  Dependent Care Responsibility: Primary(Has retired therapy dog Mary Garay))  Health Care Management: Primary  Ambulation Assistance: Independent(No AD)  Transfer Assistance: Independent  Active : Yes  Mode of Transportation: [employer]  Occupation: Retired  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 Additional Comments: Pt typically sleeps in a flat, regular bed at home. Pt reports one fall in the past year (per charting occurred on 01/18 and caused rib fractures and hemothorax).   Pt reports he has 2 sisters that live nearby    Objective                                                                                    Goals:  (Update in navigator)  Short term goals  Time Frame for Short term goals: 7 days  Short term goal 1: Pt will perform transfers for sit to stand, pivot and car with mod I  Short term goal 2: Pt will ambulate 300' with straight cane on level surfaces with mod I   Short

## 2020-03-16 NOTE — PROGRESS NOTES
[facility]  SPEECH/LANGUAGE PATHOLOGY      [x] Daily           [] Discharge    Patient:Clau Lafleur      JCX:[birth year]  [facility]:[id number]  Rehab Dx/Hx: CVA, old, disturbances of vision [I69.398, H53.9]   No Known Allergies  Precautions: ; Restrictions/Precautions: General Precautions, Fall Risk(L neglect, impulsive)          Home Situation/IADL:   Social/Functional History  Lives With: Alone  Type of Home: House  Home Layout: Multi-level, Laundry in basement, Bed/Bath upstairs(Full flight of stairs to each level; both flights have L ascending rail)  Home Access: Stairs to enter without rails  Entrance Stairs - Number of Steps: 2 ISRAEL   Entrance Stairs - Rails: None  Bathroom Shower/Tub: Tub/Shower unit(Cl[place]foot)  Bathroom Toilet: Standard  Bathroom Equipment: Grab bars in shower, Grab bars around toilet  Bathroom Accessibility: Walker accessible  Home Equipment: Rolling walker, Cane  ADL Assistance: Independent  Homemaking Assistance: Independent  Homemaking Responsibilities: Yes  Meal Prep Responsibility: Primary(Sometimes will go to sister's to eat)  Laundry Responsibility: Primary  Cleaning Responsibility: Primary  Bill Paying/Finance Responsibility: Primary  Shopping Responsibility: Primary  Dependent Care Responsibility: Primary(Has retired therapy dog Dhiraj Dia))  Health Care Management: Primary  Ambulation Assistance: Independent(No AD)  Transfer Assistance: Independent  Active : Yes  Mode of Transportation: [facility]  Occupation: Retired  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 Additional Comments: Pt typically sleeps in a flat, regular bed at home. Pt reports one fall in the past year (per charting occurred on 01/18 and caused rib fractures and hemothorax).   Pt reports he has 2 sisters that live nearby      Date of Admit: 3/11/2020  Room #: 1017/1017-A     ST Number of Minutes/Billable Intervention  Cog/Memory Deficits 60    Aphasia/Language     Dysarthria/Speech     Apraxia/Speech     Dysphagia/Swallowing     Group     Other    TOTAL Minutes Billed  60              Date: 3/16/2020  Day of ARU Week:  6       SLP Individual Minutes  Time In: 1300  Time Out: 1400  Minutes: 60     Variance/Reason:  [] Refusal due to   [] Medical hold/reason  [] Illness   [] Off Unit for test/procedure  [] Extra time needed to complete task  [] Other (specify)    Activity completed: Pt seen for cognitive tasks for visual scanning and awareness, sequencing and attn. Pen and paper tasks for calendar, phone dialing, med mgmt, and divided attn via cancellation    Pain: denies  Current Diet: DIET GENERAL;  Dietary Nutrition Supplements: Low Calorie High Protein Supplement  Subjective: Pt with reduced awareness and insight, impulsivity, reduced attn, L neglect. Goals and POC: Co-treats where appropriate with PT or OT to facilitate patient goals in functional tasks. LTG                           Short-term Goals  Timeframe for Short-term Goals: 3x/week x2 weeks for 30 mins min  LTG: Improve cognition and memory for safe return home. Goal 1: Pt will complete cog assessment by 3/16/2020 Completed 3/14/2020  37/50 mild cognitive impairment with orientation and attn, sequencing, and left visual neglect. Goal 2: Pt will improve L visual scanning for fx tasks and med mgmt with min cues 90% acc. Max to mod cues due to inattn, impulsivity, poor awareness, and reduced problem solving. Phone dialing for 4 sets of numbers required mod cues as pt would scan numbers on phone up and down and not look L and couldn't sequence a system that was provided. Cancellation of 2-3 items in a 3 column set up required max cues with perseverations on the same task. Med mgmt required mod cues to scan L and recall info. Attn required mod redirect. Calendar tasks required max cues with 30% acc for locating days of the week and dates for the month of March. Goal 3: Pt will improve problem solving and memory for fx and verbal tasks with min cues with 90% acc. Mod cues for all fx tasks this date 30%. Awareness and insight is poor. Attn requires frequent redirect. Alarm placed: []bed []chair   []other:   []ALEXSANDER activated        Barriers to progress:   [] Fatigue        [x] Cognitive Deficits   [x] Memory Deficits   [x] Reduced Attn   [] Self Limiting Behaviors    [x] Reduced insight/awareness     [x] Visual Deficits   [] Premorbid Conditions  [x] Impulsivity     [] Other      Education/Interventions used this date: Reviewed strategies with ongoing cues needed. Pt updated that he needs supervision with all meds. Dtr April was present for part of this task and able to observe difficulties. [x]   Progress was updated and reviewed in Rehabtracker with patient and/or family this         date. [] Attending Care Conference for pt this date. See Team Patient Care Conference Note for updates.     Interventions used this date:  [] Speech/Language Treatment    [] Instruction in HEP    Group   [] Dysphagia Treatment   [x] Cognitive Skill Ricky    Other:         Assessment / Impression                                                          Treatment/Activity Tolerance:   [x] Tolerated Treatment well:     [] Patient limited by fatigue/pain:       [] Patient limited by medical complications:    [] Adverse Reaction to Tx:   [] Significant change in status:      Electronically Signed by  Yefri Fajardo MA, CCC-SLP    3/16/2020  4:19 PM

## 2020-03-16 NOTE — PATIENT CARE CONFERENCE
ACUTE REHAB TEAM CONFERENCE SUMMARY   [address] 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]     Essential hypertension [I10]     Hyperlipidemia [E78.5]     Gait disturbance [R26.9]      Date: 3/17/2020    CASE MANAGEMENT  Current issues/needs regarding patient and family discharge status: Plan is for pt to return home with [facility]    PHYSICAL THERAPY   Short term goals  Time Frame for Short term goals: 7 days  Supervision for gait and stairs, Mod I for transfers  Short term goal 1: Pt will perform transfers for sit to stand, pivot and car with mod I  Short term goal 2: Pt will ambulate 300' with straight cane on level surfaces with mod I   Short term goal 3: Pt will demonstrate 20' gait on unlevel surface with supervision with straight cane  Short term goal 4: Pt will ascend/descend curb and flight of stairs with supervision using cane and grab bars as needed.    Short term goal 5: Pt will demonstrate ability to  light object from floor with mod I                PT IRF-ANGELINA scores and goals for initial assessment:   Roll Left and Right  Assistance Needed: Independent  CARE Score: 6  Discharge Goal: Independent  Sit to Lying  Assistance Needed: Independent  CARE Score: 6  Discharge Goal: Independent  Sit to Stand  Assistance Needed: Independent  Comment: (steady assistance)  CARE Score: 6  Discharge Goal: Independent  Chair/Bed-to-Chair Transfer  Assistance Needed: Independent  CARE Score: 6  Discharge Goal: Independent  Toilet Transfer  Assistance Needed: Independent  CARE Score: 6  Discharge Goal: Supervision or touching assistance  Car Transfer  Assistance Needed: Independent  CARE Score: 6  Discharge Goal: Independent  Walk 10 Feet? Walk 10 Feet?: Yes  1 Step  1 Step?: Yes  Picking Up Object  Assistance Needed: Independent  CARE Score: 6  Discharge Goal: Independent  Wheelchair Ability  Uses a Wheelchair and/or Scooter?: No                  Fall Risk: []  Yes  []  No    OCCUPATIONAL THERAPY   Short term goals  Time Frame for Short term goals: STGs=LTGs :   Long term goals  Time Frame for Long term goals : 7-10 days or until d/c. Supervision for bathing and dressing I for all others. Long term goal 1: Pt will complete grooming tasks Ind. Long term goal 2: Pt will complete total body bathing c S.  Long term goal 3: Pt will complete UB dressing c setup. Long term goal 4: Pt will complete LB dressing c setup. Long term goal 5: Pt will doff/don footwear c setup. Long term goals 6: Pt will complete toileting c supervision. Long term goal 7: Pt will complete functional transfers (bed, chair, toilet, shower) c DME PRN and S.  Long term goal 8: Pt will perform therex/therax to facilitate increased strength/endurance/ax tolerance (c emphasis on dynamic standing balance/tolerance >10 mins, cognitive retraining, and visual perceptual retraining) c SBA.   Long term goal 9: Pt will complete simple homemaking tasks c DME PRN and S. :                                       OT IRF-ANGELINA scores and goals for initial assessment:    Eating  Assistance Needed: Independent  Comment: 6 usual per Nsg  CARE Score: 6  Discharge Goal: Independent  Oral Hygiene  Assistance Needed: Independent  CARE Score: 6  Discharge Goal: [address] Needed: Independent  Comment: 4 usual per Nsg  CARE Score: 6  Discharge Goal: Supervision or touching assistance  Shower/Bathe Self  Assistance reduced. Specific barriers to progress: cognition, vision, attn, perseverations  Strategies that improve performance: verbal and tactile cues, visual markers, finger scanning    Current Medical Status:   [x] Is continent of bowel and bladder     [] Is incontinent of bowel and bladder    [] Has had an adequate number of bowel movements   [] Urinates with no urinary retention >300ml in bladder   [] Targeting bladder protocol with horta removal   [] Maintaining O2 SATs at ___%   [] Has pain managed while on ARU         [] Has had no skin breakdown while on ARU   [] Has improved skin integrity via wound measurements   [] Has no signs/symptoms of infection at the wound site  [] Has been free from injury during hospitalization   [] Has experienced a fall during hospitalization  [] Ongoing education with patient/family with understanding demonstrated for:  [] Receives IV Fluids  [] Other:        NUTRITION  Weight: 213 lb 9.6 oz (96.9 kg) / Body mass index is 29.79 kg/m². Current diet: DIET GENERAL;  Dietary Nutrition Supplements: Low Calorie High Protein Supplement  Intake:       Medical improvements/barriers: Needs assist with meds    Team goals for next treatment period/Intervention for current barriers:   [] Pt will increase activity tolerance for daily tasks. [] Pt will improve bed mobility with reduced assist.  [x] Pt will improve safety in fx tasks with reduced cues/assist  [] Pt will improve transfers with reduced assist  [] Pt will improve toileting with reduced assist  [] Pt will improve ADL's with use of adaptive equipment with reduced assist  [] Pt will improve pain mgmt for maximum participation in tx program  [] Pt will improve communication to get basic needs met on unit  [] Pt will improve swallowing for safe diet advancement with use of strategies  [x]  Plan for discharge to home.       Patient Strengths: Motivated, Cooperative and Pleasant    Ongoing tx following discharge: []HHC     [x]OUTPATIENT ST,

## 2020-03-16 NOTE — PROGRESS NOTES
Shelby Domínguez    : 1950  Acct #: [de-identified]  MRN: [id number]              PM&R Progress Note      Admitting diagnosis: Hemorrhagic stroke (right MCA distribution IPH)     Comorbid diagnoses impacting rehabilitation: Left hemiparesis, disconjugate gaze, hypertension, hyperlipidemia, obstructive sleep apnea, gait disturbance     Chief complaint: Requesting to be discharged soon. Hiccups are no longer a problem. Prior (baseline) level of function: Independent. Current level of function:         Current  IRF-ANGELINA and Goals:   Hearing, Speech, and Vision  Expression of Ideas and Wants: Some difficulty  Understanding Verbal and Non-Verbal Content: Understands  Prior Functioning: Everyday Activities  Self Care: Independent  Indoor Mobility (Ambulation):  Independent  Stairs: Independent  Functional Cognition: Independent  Prior Device Use: None of the given options(No AD)    Eating  Assistance Needed: Setup or clean-up assistance  Comment: 6 usual per Nsg  CARE Score: 5  Discharge Goal: Independent  Oral Hygiene  Assistance Needed: Supervision or touching assistance  CARE Score: 4  Discharge Goal: Independent  Toileting Hygiene  Assistance Needed: Supervision or touching assistance  Comment: 4 usual per Nsg  CARE Score: 4  Discharge Goal: Supervision or touching assistance  Shower/Bathe Self  Assistance Needed: Supervision or touching assistance  CARE Score: 4  Discharge Goal: Supervision or touching assistance  Upper Body Dressing  Assistance Needed: Supervision or touching assistance  CARE Score: 4  Discharge Goal: Set-up or clean-up assistance  Lower Body Dressing  Assistance Needed: Supervision or touching assistance  CARE Score: 4  Discharge Goal: Set-up or clean-up assistance  Putting On/Taking Off Footwear  Assistance Needed: Supervision or touching assistance  CARE Score: 4  Discharge Goal: Set-up or clean-up assistance    Roll Left and Right  Assistance Needed: Independent  CARE Score: 6  Discharge Goal: Independent  Sit to Lying  Assistance Needed: Independent  CARE Score: 6  Discharge Goal: Independent  Sit to Stand  Assistance Needed: Supervision or touching assistance  Comment: (steady assistance)  CARE Score: 4  Discharge Goal: Independent  Chair/Bed-to-Chair Transfer  Assistance Needed: Supervision or touching assistance  CARE Score: 4  Discharge Goal: Independent  Toilet Transfer  Assistance Needed: Supervision or touching assistance  CARE Score: 4  Discharge Goal: Supervision or touching assistance  Car Transfer  Assistance Needed: Supervision or touching assistance  CARE Score: 4  Discharge Goal: Independent   Walk 10 Feet? Walk 10 Feet?: Yes  1 Step  1 Step?: Yes  Picking Up Object  Assistance Needed: Partial/moderate assistance  CARE Score: 3  Discharge Goal: Independent  Wheelchair Ability  Uses a Wheelchair and/or Scooter?: No                  I      Exam:    Blood pressure 132/71, pulse 81, temperature 98.7 °F (37.1 °C), temperature source Oral, resp. rate 17, height 5' 11\" (1.803 m), weight 213 lb 9.6 oz (96.9 kg), SpO2 96 %. General: Up in a bedside chair. Alert and talkative. Better attention to the right and left visual field. Speech is intelligible. HEENT: Mucous membranes moist.  Mild left facial droop with minimal dysarthria. Pulmonary: Clear and unlabored. Cardiac: Regular rate and rhythm. Abdomen: Patient's abdomen is soft and nondistended. Bowel sounds were present throughout. There was no rebound, guarding or masses noted. Upper extremities: Nearly symmetric . Lower extremities: Slight fatigue with ankle dorsiflexion strength testing on the left. Sitting balance was good. Standing balance was fair.     Lab Results   Component Value Date    WBC 6.3 03/02/2020    HGB 13.3 (L) 03/02/2020    HCT 45.2 03/02/2020    MCV 81.4 03/02/2020     03/02/2020     Lab Results   Component Value Date    INR 0.92 03/02/2020    INR 1.35 02/13/2019    INR 0.93

## 2020-03-17 VITALS
OXYGEN SATURATION: 95 % | HEART RATE: 66 BPM | HEIGHT: 71 IN | TEMPERATURE: 98.3 F | RESPIRATION RATE: 16 BRPM | DIASTOLIC BLOOD PRESSURE: 66 MMHG | SYSTOLIC BLOOD PRESSURE: 126 MMHG | WEIGHT: 213.6 LBS | BODY MASS INDEX: 29.9 KG/M2

## 2020-03-17 PROCEDURE — 97530 THERAPEUTIC ACTIVITIES: CPT

## 2020-03-17 PROCEDURE — 6370000000 HC RX 637 (ALT 250 FOR IP): Performed by: PHYSICAL MEDICINE & REHABILITATION

## 2020-03-17 PROCEDURE — 97112 NEUROMUSCULAR REEDUCATION: CPT

## 2020-03-17 PROCEDURE — 97116 GAIT TRAINING THERAPY: CPT

## 2020-03-17 PROCEDURE — 97535 SELF CARE MNGMENT TRAINING: CPT

## 2020-03-17 PROCEDURE — 99239 HOSP IP/OBS DSCHRG MGMT >30: CPT | Performed by: PHYSICAL MEDICINE & REHABILITATION

## 2020-03-17 PROCEDURE — 97130 THER IVNTJ EA ADDL 15 MIN: CPT

## 2020-03-17 PROCEDURE — 97129 THER IVNTJ 1ST 15 MIN: CPT

## 2020-03-17 RX ORDER — CITALOPRAM 10 MG/1
10 TABLET ORAL 2 TIMES DAILY
Qty: 60 TABLET | Refills: 0 | Status: SHIPPED | OUTPATIENT
Start: 2020-03-17

## 2020-03-17 RX ORDER — AMLODIPINE BESYLATE 10 MG/1
10 TABLET ORAL DAILY
Qty: 30 TABLET | Refills: 0 | Status: SHIPPED | OUTPATIENT
Start: 2020-03-18

## 2020-03-17 RX ORDER — GABAPENTIN 100 MG/1
100 CAPSULE ORAL 3 TIMES DAILY
Qty: 90 CAPSULE | Refills: 0 | Status: SHIPPED | OUTPATIENT
Start: 2020-03-17 | End: 2020-09-23

## 2020-03-17 RX ORDER — ATORVASTATIN CALCIUM 40 MG/1
40 TABLET, FILM COATED ORAL NIGHTLY
Qty: 30 TABLET | Refills: 0 | Status: SHIPPED | OUTPATIENT
Start: 2020-03-17

## 2020-03-17 RX ORDER — FOLIC ACID 1 MG/1
1 TABLET ORAL DAILY
Qty: 30 TABLET | Refills: 0 | Status: SHIPPED | OUTPATIENT
Start: 2020-03-18 | End: 2020-09-23

## 2020-03-17 RX ORDER — LANOLIN ALCOHOL/MO/W.PET/CERES
100 CREAM (GRAM) TOPICAL DAILY
Qty: 30 TABLET | Refills: 0 | Status: SHIPPED | OUTPATIENT
Start: 2020-03-18 | End: 2020-09-23

## 2020-03-17 RX ADMIN — GABAPENTIN 100 MG: 100 CAPSULE ORAL at 14:25

## 2020-03-17 RX ADMIN — GABAPENTIN 100 MG: 100 CAPSULE ORAL at 08:19

## 2020-03-17 RX ADMIN — ACETAMINOPHEN 650 MG: 325 TABLET ORAL at 08:28

## 2020-03-17 RX ADMIN — FOLIC ACID 1 MG: 1 TABLET ORAL at 08:20

## 2020-03-17 RX ADMIN — AMLODIPINE BESYLATE 10 MG: 10 TABLET ORAL at 08:21

## 2020-03-17 RX ADMIN — Medication 100 MG: at 08:18

## 2020-03-17 RX ADMIN — CITALOPRAM HYDROBROMIDE 10 MG: 20 TABLET ORAL at 10:00

## 2020-03-17 RX ADMIN — AZITHROMYCIN 500 MG: 250 TABLET, FILM COATED ORAL at 08:20

## 2020-03-17 ASSESSMENT — PAIN DESCRIPTION - PAIN TYPE: TYPE: CHRONIC PAIN

## 2020-03-17 ASSESSMENT — PAIN DESCRIPTION - LOCATION: LOCATION: NECK

## 2020-03-17 ASSESSMENT — PAIN DESCRIPTION - DESCRIPTORS: DESCRIPTORS: DISCOMFORT

## 2020-03-17 ASSESSMENT — PAIN SCALES - GENERAL
PAINLEVEL_OUTOF10: 1
PAINLEVEL_OUTOF10: 3
PAINLEVEL_OUTOF10: 1

## 2020-03-17 ASSESSMENT — PAIN DESCRIPTION - ORIENTATION: ORIENTATION: POSTERIOR

## 2020-03-17 ASSESSMENT — PAIN - FUNCTIONAL ASSESSMENT: PAIN_FUNCTIONAL_ASSESSMENT: ACTIVITIES ARE NOT PREVENTED

## 2020-03-17 ASSESSMENT — PAIN DESCRIPTION - FREQUENCY: FREQUENCY: INTERMITTENT

## 2020-03-17 NOTE — PROGRESS NOTES
Physical Therapy  [] daily progress note       [x] discharge       Patient Name:  Giovanna Harrington   :  1950 MRN: [id number]  Room:  [address] [place] [address] Date of Admission: 3/11/2020  Rehabilitation Diagnosis:   CVA, old, disturbances of vision [I69.398, H53.9]       Date 3/17/2020       Day of ARU Week:  7   Time IN//76055   Individual Tx Minutes 60   Group Tx Minutes    Co-Treat Minutes    Concurrent Tx Minutes    TOTAL Tx Time Mins 60   Variance Time    Variance Time []   Refusal due to:     []   Medical hold/reason:    []   Illness   []   Off Unit for test/procedure  []   Extra time needed to complete task  []   Therapeutic need  []   Other (specify):   Restrictions Restrictions/Precautions  Restrictions/Precautions: General Precautions, Fall Risk(L neglect, impulsive)      Interdisciplinary communication [x]   Cleared for therapy per nursing     []   RN notified about issues during session  []   RN updated on pt performance  []   Spoke with   []   Spoke with OT  []   Spoke with MD  []   Other:    Subjective observations and cognitive status: Pt states he is ready to go with his sister today      Pain level/location:   0 /10       Location:    Discharge recommendations  Anticipated discharge date:  3/17/20  Destination: []home alone   []home alone with assist PRN     [x] home w/ family      [] Continuous supervision  []SNF    [] Assisted living     [] Other:  Continued therapy: []HHC PT  [x]OUTPATIENT  PT   [] No Further PT  []SNF PT  Caregiver training recommended: []Yes  [] No   Equipment needs: has own straight cane     Bed Mobility:           []   Pt received out of bed   Rolling R/L: independent  Scooting:  independent  Lying --> Sit:  independent  Sit --> lying:  independent  Bed features used: [] Yes  [x] No       Transfers:    Sit--> Stand:  independent  Stand --> Sit:   independent  Chair-->Bed/Bed --> Chair:   independent  Car Transfers:  independent  Toilet Transfer (if applicable): Other:    Assistive device required for transfer:   none    Gait:    Distance:  414'  Assistance:  Supervision due to cues for safety/obstacles 10%  Device:  Straight cane  Gait Quality:  Even step length with good speed, but has to adjust speed and has difficulty at surface changes or stepping over objects due to perceptual deficits      Stairs   # Completed:  15  Assistance:  Supervision with cues for cane placement  Supportive Device:  L rail ascending and straight cane    Curb   Height:  6\"  Assistance:  supervision  Device:  Straight cane    Uneven Surfaces:       Assistance:    Supervision/CG  Device:    Straight cane  Surfaces Completed:   [x]  Green mat with bean bags beneath      []  Throw rugs       []  Ramp       []  Outdoor pavements        []  Grass             []  Loose gravel        []  Other:         Additional Therapeutic activities/exercises completed this date:     [x]   Nu-step:  Time:   6min    Level:   5  Keeping 50 rpm minimum with pt self stopping at 6 min due to fatigue:       []   Rebounder:    []  Seated     []  Standing        []   Balance training         []   Postural training    []   Supine ther ex (reps/sets):     []   Seated ther ex (reps/sets):     []   Standing ther ex (reps/sets):     []   Picking up object from floor (standing):                   []   Reacher used   [x]   Other: 1. Gait level surface _2____  Instructions: Walk at your normal speed from here to the next cheyanne (20)  Grading: Cheyanne the lowest category that applies. (3) Normal: Walks 20, no assistive devices, good speed, no evidence for imbalance, normal gait pattern. (2) Mild Impairment: Walks 20, uses assistive devices, slower speed, mild gait deviations. (1) Moderate Impairment: Walks 20, slow speed, abnormal gait pattern, evidence for imbalance. (0) Severe Impairment: Cannot walk 20 without assistance, severe gait deviations or imbalance.     2. Change in gait speed _2____  Instructions: Begin walking at group as appropriate for 60 minutes.   Treatment to include Current Treatment Recommendations: Functional Mobility Training, Neuromuscular Re-education, Home Exercise Program, Equipment Evaluation, Education, & procurement, Transfer Training, Endurance Training, Stair training, Patient/Caregiver Education & Training, Balance Training, Gait Training    Electronically signed by   Fabrizio Jones PT  3/17/2020, 9:34 AM

## 2020-03-17 NOTE — PROGRESS NOTES
Nutrition Assessment    Type and Reason for Visit: Reassess    Nutrition Recommendations:   Continue general diet with oral nutrition supplement at this time    Nutrition Assessment: Intake improving as patient feeling better overall, no more diarrhea and hiccups gone. Meal intake % and will drink supplement also. Patient also excited for discharge today. Low nutrition risk at this time with improved diet tolerance and intake.      Malnutrition Assessment:  · Malnutrition Status: No malnutrition  · Context: Acute illness or injury    Nutrition Risk Level: Low    Nutrient Needs:  · Estimated Daily Total Kcal: 3344-2403  · Estimated Daily Protein (g): 78-93 (1-1.2 g/kg IBW)   · Estimated Daily Total Fluid (ml/day): 2000 (1ml/shalini)     Nutrition Diagnosis:   · Problem: Predicted suboptimal energy intake  · Etiology: related to Other (Comment)(reduced appetite, hiccups )     Signs and symptoms:  as evidenced by Patient report of(weight loss )    Objective Information:  · Nutrition-Focused Physical Findings: sitting up in chair, hiccups   · Wound Type: None  · Current Nutrition Therapies:  · Oral Diet Orders: General   · Oral Diet intake: %, 51-75%  · Oral Nutrition Supplement (ONS) Orders: Low Calorie High Protein Supplement  · ONS intake: %  · Anthropometric Measures:  · Ht: 5' 11\" (180.3 cm)   · Current Body Wt: 213 lb 10 oz (96.9 kg)  · Admission Body Wt: 226 lb 3.1 oz (102.6 kg)  · Usual Body Wt:    · % Weight Change:  ,  reported loss, per hx no significant loss in past year   · Ideal Body Wt: 172 lb (78 kg), % [place] Body 128  · BMI Classification: BMI 30.0 - 34.9 Obese Class I(BMI-30.9)    Nutrition Interventions:   Continue current diet  Continued Inpatient Monitoring    Nutrition Evaluation:   · Evaluation: Progressing toward goals   · Goals: Patient will consume at least 75% at meals during stay     · Monitoring: Weight, Meal Intake, Pertinent Labs, Diet Tolerance      Electronically signed by Janet Reed RD, LD on 3/17/20 at 11:00 AM EDT    Contact Number: [phone]

## 2020-03-17 NOTE — PROGRESS NOTES
ARU  Discharge Summary    D/C Date: 3/17/2020    Patient discharged to: Home with family support. Transported by: Family    Referrals made to: [facility]    Additional information: No DME needed. Patient's prescriptions were taken to [facility]. Patient states that he has a cane at home and his sister can retrieve it from the house so that he can use it to get into the car. Patient states that his sister will also assist with his medication regimen.  JUHI Bella/MAR  3/17/2020, 2:45 PM

## 2020-03-17 NOTE — PROGRESS NOTES
[facility] UNIT  SPEECH/LANGUAGE PATHOLOGY      [x] Daily           [x] Discharge    Patient:Clau Baum      RRG:[birth year]  T:[id number]  Rehab Dx/Hx: CVA, old, disturbances of vision [I69.398, H53.9]   No Known Allergies  Precautions: ; Restrictions/Precautions: General Precautions, Fall Risk(L neglect, impulsive)          Home Situation/IADL:   Social/Functional History  Lives With: Alone  Type of Home: House  Home Layout: Multi-level, Laundry in basement, Bed/Bath upstairs(Full flight of stairs to each level; both flights have L ascending rail)  Home Access: Stairs to enter without rails  Entrance Stairs - Number of Steps: 2 [place]   Entrance Stairs - Rails: None  Bathroom Shower/Tub: Tub/Shower unit([place])  Bathroom Toilet: Standard  Bathroom Equipment: Grab bars in shower, Grab bars around toilet  Bathroom Accessibility: Walker accessible  Home Equipment: Rolling walker, Cane  ADL Assistance: Independent  Homemaking Assistance: Independent  Homemaking Responsibilities: Yes  Meal Prep Responsibility: Primary(Sometimes will go to sister's to eat)  Laundry Responsibility: Primary  Cleaning Responsibility: Primary  Bill Paying/Finance Responsibility: Primary  Shopping Responsibility: Primary  Dependent Care Responsibility: Primary(Has retired therapy dog Wanda Lozada))  Health Care Management: Primary  Ambulation Assistance: Independent(No AD)  Transfer Assistance: Independent  Active : Yes  Mode of Transportation: [facility]  Occupation: Retired  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 Additional Comments: Pt typically sleeps in a flat, regular bed at home. Pt reports one fall in the past year (per charting occurred on 01/18 and caused rib fractures and hemothorax).   Pt reports he has 2 sisters that solving for pen and paper tasks required mod cues with 60% acc. Pt discharging home with sister today with continued ST recommended for mild cognitive deficits with L neglect, reduced problem solving, reduced sequencing and attn, perseverations on tasks, reduced awareness, and needing compensatory strategies. Prognosis for improvement is good based on motivation and support. Alarm placed: []bed []chair   []other:   []ALEXSANDER activate  Pt refused alarm despite rationale. Barriers to progress:   [] Fatigue        [x] Cognitive Deficits   [] Memory Deficits   [x] Reduced Attn   [] Self Limiting Behaviors    [x] Reduced insight/awareness     [x] Visual Deficits   [] Premorbid Conditions  [] Impulsivity     [] Other      Education/Interventions used this date: Reviewed compensatory strategies for scanning in fx for pen and paper tasks, phone, meds, calendars, remote control. []   Progress was updated and reviewed in Rehabtracker with patient and/or family this         date. [] Attending Care Conference for pt this date. See Team Patient Care Conference Note for updates.     Interventions used this date:  [] Speech/Language Treatment    [] Instruction in HEP    Group   [] Dysphagia Treatment   [x] Cognitive Skill Ricky    Other:         Assessment / Impression                                                          Treatment/Activity Tolerance:   [x] Tolerated Treatment well:     [] Patient limited by fatigue/pain:       [] Patient limited by medical complications:    [] Adverse Reaction to Tx:   [] Significant change in status:      Electronically Signed by  Karlene Back MA, CCC-SLP    3/17/2020  11:12 AM

## 2020-03-17 NOTE — PROGRESS NOTES
Training   [x]   Endurance Training   []   Neuromuscular Re-ed   []   Nu-step:  Time:        Level:         #Steps:       []   Rebounder:    []  Seated     []  Standing        []   Supine Ther Ex (reps/sets):     []   Seated Ther Ex (reps/sets):     []   Standing Ther Ex (reps/sets):     []   Other:      Comments: All intervention performed to increase pt's endurance, ax tolerance, balance, visual/perceptual skills, and I c ADLs/IADLs and functional transfers/mobility. Patient/Caregiver Education and Training:   []   [employer] Equipment Use  [x]   Bed Mobility/Transfer Technique/Safety  []   Energy Conservation Tips  []   Family training  [x]   Postural Awareness  [x]   Safety During Functional Activities  []   Reinforced Patient's Precautions   []   Progress was updated and reviewed in Rehabtracker with patient and/or family this         date. Treatment Plan for Next Session: Planned d/c from ARU this date. Assessment:  Pt has progressed well towards goals and even surpassed some, however continuous supervision is still recommended at discharge 2* cognitive deficits and L neglect.        Treatment/Activity Tolerance:   [x] Tolerated treatment with no adverse effects    [] Patient limited by fatigue  [] Patient limited by pain   [] Patient limited by medical complications:    [] Adverse reaction to Tx:   [] Significant change in status    Safety:       []  bed alarm set    [x]  chair alarm set    []  Pt refused alarms                []  Telesitter activated      [x]  Gait belt used during tx session      []other:       Number of Minutes/Billable Intervention  Therapeutic Exercise    ADL Self-care 35   Neuro Re-Ed    Therapeutic Activity 30   Group    Other:    TOTAL 65       Social History  Social/Functional History  Lives With: Alone  Type of Home: House  Home Layout: Multi-level, Laundry in basement, Bed/Bath upstairs(Full flight of stairs to each level; both flights have L ascending rail)  Home

## 2020-03-17 NOTE — DISCHARGE SUMMARY
because he is impulsive, has some right neglect and does consistently show insight into his deficits. No driving. Outpatient occupational and physical therapy will be arranged. Andrade PORTILLO   Home Medication Instructions MLE:[id number]    Printed on:03/17/20 6539   Medication Information                      acetaminophen (TYLENOL) 325 MG tablet  Take 500 mg by mouth 2 times daily as needed              amLODIPine (NORVASC) 10 MG tablet  Take 1 tablet by mouth daily             atorvastatin (LIPITOR) 40 MG tablet  Take 1 tablet by mouth nightly             citalopram (CELEXA) 10 MG tablet  Take 1 tablet by mouth 2 times daily             docusate sodium (COLACE, DULCOLAX) 100 MG CAPS  Take 100 mg by mouth 2 times daily             esomeprazole (NEXIUM) 40 MG delayed release capsule  Take 40 mg by mouth every other day 01/16/19 Patient states he takes this every other day, took today             folic acid (FOLVITE) 1 MG tablet  Take 1 tablet by mouth daily             gabapentin (NEURONTIN) 100 MG capsule  Take 1 capsule by mouth 3 times daily for 30 days. multivitamin (THERAGRAN) per tablet  Take 1 tablet by mouth daily. OXYGEN  Inhale 2 L into the lungs nightly             traMADol (ULTRAM) 50 MG tablet  Take 50 mg by mouth every 6 hours as needed for Pain. .             vitamin B-1 100 MG tablet  Take 1 tablet by mouth daily                 CONDITION ON DISCHARGE: Stable. The prognosis is fair+ for further improvements in ADL's and safety with adapted gait/transfers. Record review, patient exam, discharge instructions, medication reconciliation and summary for this discharge visit took more than 30 minutes.

## 2020-03-20 ENCOUNTER — HOSPITAL ENCOUNTER (OUTPATIENT)
Dept: PHYSICAL THERAPY | Age: 70
Setting detail: THERAPIES SERIES
Discharge: HOME | End: 2020-03-20
Payer: MEDICARE

## 2020-03-20 ENCOUNTER — HOSPITAL ENCOUNTER (OUTPATIENT)
Dept: OCCUPATIONAL THERAPY | Age: 70
Setting detail: THERAPIES SERIES
Discharge: HOME | End: 2020-03-20
Payer: MEDICARE

## 2020-03-20 PROCEDURE — 97161 PT EVAL LOW COMPLEX 20 MIN: CPT

## 2020-03-20 PROCEDURE — 97112 NEUROMUSCULAR REEDUCATION: CPT

## 2020-03-20 PROCEDURE — 97166 OT EVAL MOD COMPLEX 45 MIN: CPT

## 2020-03-20 NOTE — PROGRESS NOTES
Peg Test Time (secs): (41)  Right 9 Hole Peg Test Time (secs): (29)           Assessment   Assessment  Performance deficits / Impairments: Decreased strength;Decreased fine motor control;Decreased ADL status  Treatment Diagnosis: lack of coordination  Prognosis: Good  Decision Making: Medium Complexity  History: expanded  Exam: 3 performance deficits  Assistance / Modification: min A  REQUIRES OT FOLLOW UP: Yes       Plan   Plan  Times per week: 2  Plan weeks: 4  Current Treatment Recommendations: Strengthening, Patient/Caregiver Education & Training, Safety Education & Training    G-Code     OutComes Score                                                  Goals  Long term goals  Time Frame for Long term goals : 4 weeks  Long term goal 1: pt. will increase overall B UE strength to 5/5  Long term goal 2: Pt. will increase left fine motor coordination as evidenced by 9 hole peg test       Therapy Time   Individual Concurrent Group Co-treatment   Time In 0400         Time Out 8854         Minutes 39                 Jessa Spencer, OTR/L        [x]Marta Galvin [address]      [facility]     [address] [facility] [address]       [facility] [address], [address] [facility], [address]       [address]     [phone]  [phone] [phone]  ________________________________________________________________________    Occupational Therapy Certification/Re-Certification Form  Dear Dr. Duran Page  The following patient has been evaluated for occupational therapy services and for therapy to continue, insurance requires physician review of the treatment plan initially and every 90 days. Please review the attached evaluation and/or summary of the patient's plan of care, and verify that you agree therapy should continue by signing the attached document and sending it back to our office.     Plan of Care/Treatment to date:  [x] Therapeutic Exercise   []

## 2020-03-21 NOTE — PROGRESS NOTES
Physical Therapy  Initial Assessment  Date: 3/21/2020- eval performed 3/20/20  Patient Name: Sharon Lane  MRN: [id number]  : 1950     Treatment Diagnosis: L field neglect/impaired mobility    Restrictions  Position Activity Restriction  Other position/activity restrictions: not driving    Subjective   General  Chart Reviewed: Yes  Patient assessed for rehabilitation services?: Yes  Additional Pertinent Hx: Has had a CVA back in ,  L/R TKA ; old L RC injury  Family / Caregiver Present: No  Referring Practitioner: Holli Huntley  Diagnosis: CVA  Follows Commands: Within Functional Limits  PT Visit Information  PT Insurance Information: Medicare Advantage  Subjective  Subjective: Patient had R CVA 3/2/20- discharged from ARU 3/17/20- primary issue is visual with L field neglect  Pain Screening  Patient Currently in Pain: No  Vital Signs  Patient Currently in Pain: No    Vision/Hearing  Vision  Vision: Impaired(L field neglect)  Hearing  Hearing: Within functional limits    Orientation  Orientation  Overall Orientation Status: Within Normal Limits    Social/Functional History  Social/Functional History  Lives With: Alone  Lives With: Spouse  Type of Home: House  Home Layout: Two level;Bed/Bath upstairs  Additional Comments: bedroom and bathroom on the second floor.  Does a step to stair pattern  Receives Help From: Family;Friend(s)  ADL Assistance: Independent  Homemaking Assistance: Independent  Ambulation Assistance: Independent  Transfer Assistance: Independent  Active : No  Occupation: Retired  Type of occupation: retired  Leisure & Hobbies: dog/TV    Objective     Observation/Palpation  Posture: Good    AROM RLE (degrees)  RLE AROM: WFL  AROM LLE (degrees)  LLE AROM : WFL  AROM RUE (degrees)  RUE AROM : WFL  AROM LUE (degrees)  LUE General AROM: limited due to old RC injury    Strength RLE  Strength RLE: WFL  Strength LLE  Strength LLE: WFL  Strength RUE  Strength RUE: WFL  Strength LUE  Strength LUE: Exception  L Shoulder Flexion: At least;4-/5  L Shoulder ABduction: At least;4-/5  L Shoulder External Rotation: At least;4-/5           Bed mobility  Rolling to Left: Independent  Rolling to Right: Independent  Supine to Sit: Independent  Sit to Supine: Independent  Transfers  Sit to Stand: Independent  Stand to sit:  Independent       Ambulation  Ambulation?: Yes  Ambulation 1  Surface: carpet  Device: No Device  Gait Deviations: Increased CRISTLE  Balance  Sitting - Static: Good  Sitting - Dynamic: Good  Standing - Static: Good  Standing - Dynamic: Fair  Single Leg Stance R Le  Single Leg Stance L Le                         Assessment   Conditions Requiring Skilled Therapeutic Intervention  Body structures, Functions, Activity limitations: Decreased balance;Decreased endurance  Assessment: 6  ft  Treatment Diagnosis: L field neglect/impaired mobility  Prognosis: Good  Decision Making: Low Complexity  History: no PF  Exam: 6 MWT/ Sl balance  Clinical Presentation: changing characteristics of function due to effects of CVA  Barriers to Learning: none  REQUIRES PT FOLLOW UP: Yes     Goals  Short term goals  Time Frame for Short term goals: defer to LTG  Long term goals  Time Frame for Long term goals : 12 weeks  20  Long term goal 1: patient's goal- maximize function with ADL/ learn techniques to deal/compensate with L field neglect  Long term goal 2: 15 sec SL balance R/L leg EO  Long term goal 3: 1000 ft  6 MWT       Therapy Time   Individual Concurrent Group Co-treatment   Time In 1515         Time Out 1555         Minutes 40         Timed Code Treatment Minutes: 20 Minutes       AMEE Bernard, PT

## 2020-03-21 NOTE — PLAN OF CARE
Training     [] Cervical Traction [] Lumbar Traction  [x] Neuromuscular Re-education [] Cold/hotpack [] Iontophoresis   [x] Instruction in HEP       [x] Manual Therapy     [] vasopneumatic            [x] Self care home management        []Dry needling trigger point point/pain management    Plan of Care Date Range:  POC planned  Till 6/12/20        Frequency/Duration:  # Days per week: [x] 1 day # Weeks: [] 1 week [] 5 weeks     [x] 2 days? [] 2 weeks [] 6 weeks     [] 3 days   [] 3 weeks [] 7 weeks     [] 4 days   [] 4 weeks [] 12 weeks  Rehab Potential: [] Excellent [x] Good [] Fair  [] Poor   Goals:  Short term goals  Time Frame for Short term goals: defer to LTG  Long term goals  Time Frame for Long term goals : 12 weeks  6/12/20  Long term goal 1: patient's goal- maximize function with ADL/ learn techniques to deal/compensate with L field neglect  Long term goal 2: 15 sec SL balance R/L leg EO  Long term goal 3: 1000 ft  6 MWT  Electronically signed by:  Syed Soni, PT, 3/21/2020, 9:03 AM        If you have any questions or concerns, please don't hesitate to call.   Thank you for your referral.    Physician Signature:_________________Date:____________Time: ________  By signing above, therapists plan is approved by physician

## 2020-03-26 ENCOUNTER — HOSPITAL ENCOUNTER (OUTPATIENT)
Age: 70
Setting detail: SPECIMEN
Discharge: HOME | End: 2020-03-26
Payer: MEDICARE

## 2020-03-26 LAB
BACTERIA: ABNORMAL /HPF
BILIRUBIN URINE: NEGATIVE MG/DL
BLOOD, URINE: ABNORMAL
CLARITY: ABNORMAL
COLOR: YELLOW
GLUCOSE, URINE: NEGATIVE MG/DL
KETONES, URINE: NEGATIVE MG/DL
LEUKOCYTE ESTERASE, URINE: ABNORMAL
NITRITE URINE, QUANTITATIVE: NEGATIVE
PH, URINE: 6 (ref 5–8)
PROTEIN UA: 30 MG/DL
RBC URINE: 9 /HPF (ref 0–3)
SPECIFIC GRAVITY UA: 1.01 (ref 1–1.03)
TRICHOMONAS: ABNORMAL /HPF
UROBILINOGEN, URINE: NORMAL MG/DL (ref 0.2–1)
WBC CLUMP: ABNORMAL /HPF
WBC UA: 894 /HPF (ref 0–2)

## 2020-03-26 PROCEDURE — 87186 SC STD MICRODIL/AGAR DIL: CPT

## 2020-03-26 PROCEDURE — 87086 URINE CULTURE/COLONY COUNT: CPT

## 2020-03-26 PROCEDURE — 87088 URINE BACTERIA CULTURE: CPT

## 2020-03-26 PROCEDURE — 81001 URINALYSIS AUTO W/SCOPE: CPT

## 2020-03-29 LAB
CULTURE: ABNORMAL
Lab: ABNORMAL
SPECIMEN: ABNORMAL
TOTAL COLONY COUNT: ABNORMAL

## 2020-04-14 ENCOUNTER — HOSPITAL ENCOUNTER (OUTPATIENT)
Age: 70
Setting detail: SPECIMEN
Discharge: HOME | End: 2020-04-14
Payer: MEDICARE

## 2020-04-14 LAB
BACTERIA: NEGATIVE /HPF
BILIRUBIN URINE: NEGATIVE MG/DL
BLOOD, URINE: NEGATIVE
CLARITY: CLEAR
COLOR: YELLOW
GLUCOSE, URINE: NEGATIVE MG/DL
HYALINE CASTS: 2 /LPF
KETONES, URINE: NEGATIVE MG/DL
LEUKOCYTE ESTERASE, URINE: NEGATIVE
MUCUS: ABNORMAL HPF
NITRITE URINE, QUANTITATIVE: NEGATIVE
PH, URINE: 6 (ref 5–8)
PROTEIN UA: NEGATIVE MG/DL
RBC URINE: 1 /HPF (ref 0–3)
SPECIFIC GRAVITY UA: 1.02 (ref 1–1.03)
SQUAMOUS EPITHELIAL: 2 /HPF
TRICHOMONAS: ABNORMAL /HPF
UROBILINOGEN, URINE: NORMAL MG/DL (ref 0.2–1)
WBC UA: 3 /HPF (ref 0–2)

## 2020-04-14 PROCEDURE — 87186 SC STD MICRODIL/AGAR DIL: CPT

## 2020-04-14 PROCEDURE — 87088 URINE BACTERIA CULTURE: CPT

## 2020-04-14 PROCEDURE — 87086 URINE CULTURE/COLONY COUNT: CPT

## 2020-04-14 PROCEDURE — 81001 URINALYSIS AUTO W/SCOPE: CPT

## 2020-04-15 LAB
CULTURE: ABNORMAL
Lab: ABNORMAL
SPECIMEN: ABNORMAL
TOTAL COLONY COUNT: ABNORMAL

## 2020-05-08 ENCOUNTER — HOSPITAL ENCOUNTER (OUTPATIENT)
Age: 70
Setting detail: SPECIMEN
Discharge: HOME | End: 2020-05-08
Payer: MEDICARE

## 2020-05-08 LAB
BACTERIA: NEGATIVE /HPF
BILIRUBIN URINE: NEGATIVE MG/DL
BLOOD, URINE: ABNORMAL
CLARITY: CLEAR
COLOR: YELLOW
GLUCOSE, URINE: 50 MG/DL
HYALINE CASTS: 2 /LPF
KETONES, URINE: NEGATIVE MG/DL
LEUKOCYTE ESTERASE, URINE: NEGATIVE
MUCUS: ABNORMAL HPF
NITRITE URINE, QUANTITATIVE: NEGATIVE
PH, URINE: 7 (ref 5–8)
PROTEIN UA: NEGATIVE MG/DL
RBC URINE: 1 /HPF (ref 0–3)
SPECIFIC GRAVITY UA: 1.01 (ref 1–1.03)
SQUAMOUS EPITHELIAL: <1 /HPF
TRICHOMONAS: ABNORMAL /HPF
UROBILINOGEN, URINE: NORMAL MG/DL (ref 0.2–1)
WBC UA: 1 /HPF (ref 0–2)

## 2020-05-08 PROCEDURE — 87086 URINE CULTURE/COLONY COUNT: CPT

## 2020-05-08 PROCEDURE — 81001 URINALYSIS AUTO W/SCOPE: CPT

## 2020-05-10 LAB
CULTURE: NORMAL
Lab: NORMAL
SPECIMEN: NORMAL

## 2020-06-16 ENCOUNTER — HOSPITAL ENCOUNTER (OUTPATIENT)
Dept: SLEEP CENTER | Age: 70
Discharge: HOME | End: 2020-06-16
Payer: MEDICARE

## 2020-06-16 VITALS — WEIGHT: 218 LBS | BODY MASS INDEX: 30.52 KG/M2 | HEIGHT: 71 IN

## 2020-06-16 PROCEDURE — 99211 OFF/OP EST MAY X REQ PHY/QHP: CPT

## 2020-06-16 ASSESSMENT — SLEEP AND FATIGUE QUESTIONNAIRES
HOW LIKELY ARE YOU TO NOD OFF OR FALL ASLEEP WHEN YOU ARE A PASSENGER IN A CAR FOR AN HOUR WITHOUT A BREAK: 1
HOW LIKELY ARE YOU TO NOD OFF OR FALL ASLEEP WHILE SITTING AND READING: 2
HOW LIKELY ARE YOU TO NOD OFF OR FALL ASLEEP WHILE WATCHING TV: 3
HOW LIKELY ARE YOU TO NOD OFF OR FALL ASLEEP WHILE SITTING AND TALKING TO SOMEONE: 1
HOW LIKELY ARE YOU TO NOD OFF OR FALL ASLEEP WHILE SITTING QUIETLY AFTER LUNCH WITHOUT ALCOHOL: 3
HOW LIKELY ARE YOU TO NOD OFF OR FALL ASLEEP WHILE SITTING INACTIVE IN A PUBLIC PLACE: 2
ESS TOTAL SCORE: 16
HOW LIKELY ARE YOU TO NOD OFF OR FALL ASLEEP WHILE LYING DOWN TO REST IN THE AFTERNOON WHEN CIRCUMSTANCES PERMIT: 3
HOW LIKELY ARE YOU TO NOD OFF OR FALL ASLEEP IN A CAR, WHILE STOPPED FOR A FEW MINUTES IN TRAFFIC: 1

## 2020-06-16 NOTE — PROGRESS NOTES
Jenae Vail MD, Maria Guadalupe Dumas MD, Augustina Salcedo MD, Rebecca Ivan MD, [facility]      [address], [address] [facility]   [place] [address]: [phone]  F: [phone]     Subjective:     Patient ID: Merle Rajan is a 72 y.o. male, referred to the sleep center for   Chief Complaint   Patient presents with    Sleep Apnea   . Merle Rajan is a 72 y.o. male being evaluated by a Virtual Visit (video visit) encounter to address concerns as mentioned above. A caregiver was present when appropriate. Due to this being a TeleHealth encounter (During [address] emergency), evaluation of the following organ systems was limited: Vitals/Constitutional/EENT/Resp/CV/GI//MS/Neuro/Skin/Heme-Lymph-Imm. Pursuant to the emergency declaration under the [address] [place] [address] authority and the [employer] and [employer] Act, this Virtual Visit was conducted with patient's (and/or legal guardian's) consent, to reduce the patient's risk of exposure to COVID-19 and provide necessary medical care. The patient (and/or legal guardian) has also been advised to contact this office for worsening conditions or problems, and seek emergency medical treatment and/or call 911 if deemed necessary. .    .    Services were provided through a video synchronous discussion virtually to substitute for in-person clinic visit. Patient and provider were located at their individual homes. --Reinier Young MD on 6/16/2020 at 10:52 AM    An electronic signature was used to authenticate this note. Referring physician:  Rhys Crystal year old male with loud snoring and witnessed to stop breathing. He does not feel fresh when wakes up and feels tired and fatigued as day goes by.he has EDS.     Social History     Socioeconomic History    Marital status: Single     Spouse name: Not on file    Number of children: Not on file    Years of education: Not on file    Highest education level: Not on file   Occupational History    Not on file   Social Needs    Financial resource strain: Not on file    Food insecurity     Worry: Not on file     Inability: Not on file    Transportation needs     Medical: Not on file     Non-medical: Not on file   Tobacco Use    Smoking status: Former Smoker     Packs/day: 0.50     Years: 30.00     Pack years: 15.00     Types: Cigarettes     Last attempt to quit: 1/27/2010     Years since quitting: 10.3    Smokeless tobacco: Never Used   Substance and Sexual Activity    Alcohol use: Yes    Drug use: No    Sexual activity: Not on file   Lifestyle    Physical activity     Days per week: Not on file     Minutes per session: Not on file    Stress: Not on file   Relationships    Social connections     Talks on phone: Not on file     Gets together: Not on file     Attends Buddhism service: Not on file     Active member of club or organization: Not on file     Attends meetings of clubs or organizations: Not on file     Relationship status: Not on file    Intimate partner violence     Fear of current or ex partner: Not on file     Emotionally abused: Not on file     Physically abused: Not on file     Forced sexual activity: Not on file   Other Topics Concern    Not on file   Social History Narrative    Not on file       Prior to Admission medications    Medication Sig Start Date End Date Taking? Authorizing Provider   gabapentin (NEURONTIN) 100 MG capsule Take 1 capsule by mouth 3 times daily for 30 days.  3/17/20 6/16/20 Yes C Babs Erwin MD   citalopram (CELEXA) 10 MG tablet Take 1 tablet by mouth 2 times daily 3/17/20  Yes C Babs Erwin MD   atorvastatin (LIPITOR) 40 MG tablet Take 1 tablet by mouth nightly 3/17/20  Yes KARLA Erwin MD   amLODIPine (NORVASC) 10 MG tablet Take 1 tablet by mouth daily 3/18/20  Yes KARLA Kevin Sinha MD   folic acid (FOLVITE) 1 MG tablet Take 1 tablet by mouth daily 3/18/20  Yes C Kevin Sinha MD   traMADol (ULTRAM) 50 MG tablet Take 50 mg by mouth every 6 hours as needed for Pain. .   Yes Historical Provider, MD   esomeprazole (NEXIUM) 40 MG delayed release capsule Take 40 mg by mouth every other day 01/16/19 Patient states he takes this every other day, took today   Yes Historical Provider, MD   acetaminophen (TYLENOL) 325 MG tablet Take 500 mg by mouth 2 times daily as needed    Yes Historical Provider, MD   vitamin B-1 100 MG tablet Take 1 tablet by mouth daily  Patient not taking: Reported on 6/16/2020 3/18/20   C Kevin Sinha MD   docusate sodium (COLACE, DULCOLAX) 100 MG CAPS Take 100 mg by mouth 2 times daily  Patient not taking: Reported on 6/16/2020 1/18/19   Donte Patel MD   OXYGEN Inhale 2 L into the lungs nightly  Patient not taking: Reported on 6/16/2020 1/18/19   Donte Patel MD   multivitamin [facility]) per tablet Take 1 tablet by mouth daily.       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    Essential hypertension    Hyperlipidemia    Gait disturbance    Stroke due to intracerebral hemorrhage (HCC)       Past Medical History:   Diagnosis Date    Arthritis     Former smoker 2/27/2019    GERD (gastroesophageal reflux disease)     Hx of fall     per old chart pt in ER 1/18/2019 after fall - fx 4 ribs and small hemothorax    CPAP      []  More Than 4 Hours      []  Less Than 4 Hours  []  CPAP/BPAP/ASV Pressure Readings   []  CPAP Pressure      cm H20   []  BPAP Pressure       cm H20   []  ASV Pressure         cm H20      Assessment:      Diagnosis:  fariha       Patient Active Problem List    Diagnosis Date Noted    Stroke due to intracerebral hemorrhage ([facility] 03/14/2020    Hemorrhagic stroke (HCC)     Hemiparesis of left nondominant side as late effect of nontraumatic intracerebral hemorrhage (HCC)     Dysarthria due to acute stroke ([facility]     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     Essential hypertension     Hyperlipidemia     Gait disturbance     CVA, old, disturbances of vision 03/11/2020    Former smoker 02/27/2019    Shortness of breath 02/27/2019    Obstructive sleep apnea 02/27/2019    Rotator cuff strain, left, initial encounter 02/14/2019    Hemothorax on right 02/13/2019    Falls frequently 02/13/2019    Hemothorax 01/16/2019     Plan:        Sleep Study:     []  Sleep hygiene/ relaxation methods & CBTi principles review with patient     []  HST - Home Sleep Study   [x]  PSG - Overnight Diagnostic Polysomnogram     []  CPAP Titration    [] Split Night Study    [] BiLevel Titration    [] ASV - Auto-Servo Ventilation Titration       []  MSLT - Multiple Sleep Latency Test   []  MWT - Maintenance of Wakefulness Test    CPAP Therapy:     []  Patient to be seen for new mask fitting/desensitization   []  AutoPAP Titration    []  CPAP supplies and equipment at ________cmH2O    []  Continue same CPAP pressure   []  Change CPAP pressure to _______cm H2O   []  CPAP supplies only, no pressure change   []  Refer for an oral appliance       Medications:       [x]  Continue current medication    []  Add Medication:  ________________    Follow-Up:     []  No follow up required. Patient to return as needed.     []  2 weeks   []  4 weeks   []  2 months   []  4 months   []  6 months   []  1 year for CPAP compliance

## 2020-06-17 ENCOUNTER — HOSPITAL ENCOUNTER (OUTPATIENT)
Dept: ULTRASOUND IMAGING | Age: 70
Discharge: HOME | End: 2020-06-17
Payer: MEDICARE

## 2020-06-17 ENCOUNTER — HOSPITAL ENCOUNTER (OUTPATIENT)
Dept: ULTRASOUND IMAGING | Age: 70
End: 2020-06-17
Payer: MEDICARE

## 2020-06-17 PROCEDURE — 76770 US EXAM ABDO BACK WALL COMP: CPT

## 2020-07-21 NOTE — DISCHARGE SUMMARY
Outpatient Physical Therapy        [x] Phone: [phone]   Fax: [phone]  Physician: Referring Practitioner: Renee Page        From: Ron Valencia PT     Patient: Shaniqua Santiago                    : 1950  Diagnosis: CVA  Treatment Diagnosis: L field neglect/impaired mobility  Date: 2020    []  Progress Note                [x]  Discharge Note    Total Visits to date:    1- patient was to schedule if he felt he needed further PT   Cancels/No-shows to date:       Subjective:  Refer to eval      Prominent Objective Findings:  Refer to eval      Assessment:        Goal Status:  [] Achieved [] Partially Achieved  [x] Not Assessed   Short term goals  Time Frame for Short term goals: defer to LTG  Long term goals  Time Frame for Long term goals : 12 weeks  20  Long term goal 1: patient's goal- maximize function with ADL/ learn techniques to deal/compensate with L field neglect  Long term goal 2: 15 sec SL balance R/L leg EO  Long term goal 3: 1000 ft  6 MWT  Changes to goals:    Planned Services:  [x] Therapeutic Exercise    [] Modalities:  [x] Therapeutic Activity     [] Ultrasound  [] Electric Stimulation  [x] Gait Training      [] Cervical Traction    [] Lumbar Traction  [x] Neuromuscular Re-education  [] Cold/hotpack [] Iontophoresis  [x] Instruction in HEP      Other:  [x] Manual Therapy       []  Vasopneumatic  [x] Self care management                           [] Dry needling trigger point/pain management                ? [x] Patient now discharged-has not scheduled further OP PT      Electronically signed by:  Ron Valencia PT, 2020, 10:29 AM    If you have any questions or concerns, please don't hesitate to call.   Thank you for your referral.

## 2020-07-30 ENCOUNTER — HOSPITAL ENCOUNTER (OUTPATIENT)
Dept: CT IMAGING | Age: 70
Discharge: HOME | End: 2020-07-30
Payer: MEDICARE

## 2020-07-30 LAB
GFR AFRICAN AMERICAN: >60 ML/MIN/1.73M2
GFR NON-AFRICAN AMERICAN: >60 ML/MIN/1.73M2
POC CREATININE: 1 MG/DL (ref 0.9–1.3)

## 2020-07-30 PROCEDURE — 6360000004 HC RX CONTRAST MEDICATION: Performed by: SPECIALIST

## 2020-07-30 PROCEDURE — 74178 CT ABD&PLV WO CNTR FLWD CNTR: CPT

## 2020-07-30 PROCEDURE — 70450 CT HEAD/BRAIN W/O DYE: CPT

## 2020-07-30 PROCEDURE — 2580000003 HC RX 258: Performed by: SPECIALIST

## 2020-07-30 RX ORDER — SODIUM CHLORIDE 0.9 % (FLUSH) 0.9 %
10 SYRINGE (ML) INJECTION ONCE
Status: COMPLETED | OUTPATIENT
Start: 2020-07-30 | End: 2020-07-30

## 2020-07-30 RX ADMIN — IOPAMIDOL 75 ML: 755 INJECTION, SOLUTION INTRAVENOUS at 11:16

## 2020-07-30 RX ADMIN — Medication 10 ML: at 11:16

## 2020-08-17 ENCOUNTER — OFFICE VISIT (OUTPATIENT)
Dept: SURGERY | Age: 70
End: 2020-08-17
Payer: MEDICARE

## 2020-08-17 VITALS
OXYGEN SATURATION: 94 % | WEIGHT: 222.6 LBS | HEIGHT: 71 IN | TEMPERATURE: 96.7 F | BODY MASS INDEX: 31.16 KG/M2 | SYSTOLIC BLOOD PRESSURE: 130 MMHG | DIASTOLIC BLOOD PRESSURE: 70 MMHG | HEART RATE: 74 BPM

## 2020-08-17 PROCEDURE — 4040F PNEUMOC VAC/ADMIN/RCVD: CPT | Performed by: SURGERY

## 2020-08-17 PROCEDURE — 99214 OFFICE O/P EST MOD 30 MIN: CPT | Performed by: SURGERY

## 2020-08-17 PROCEDURE — 1036F TOBACCO NON-USER: CPT | Performed by: SURGERY

## 2020-08-17 PROCEDURE — 3017F COLORECTAL CA SCREEN DOC REV: CPT | Performed by: SURGERY

## 2020-08-17 PROCEDURE — G8419 CALC BMI OUT NRM PARAM NOF/U: HCPCS | Performed by: SURGERY

## 2020-08-17 PROCEDURE — 1123F ACP DISCUSS/DSCN MKR DOCD: CPT | Performed by: SURGERY

## 2020-08-17 PROCEDURE — G8427 DOCREV CUR MEDS BY ELIG CLIN: HCPCS | Performed by: SURGERY

## 2020-08-17 RX ORDER — TAMSULOSIN HYDROCHLORIDE 0.4 MG/1
0.4 CAPSULE ORAL DAILY
COMMUNITY

## 2020-08-17 ASSESSMENT — ENCOUNTER SYMPTOMS
ANAL BLEEDING: 0
CHOKING: 0
SORE THROAT: 0
ABDOMINAL PAIN: 1
BACK PAIN: 0
EYE ITCHING: 0
CONSTIPATION: 0
APNEA: 0
EYE REDNESS: 0
COLOR CHANGE: 0
PHOTOPHOBIA: 0
STRIDOR: 0
RECTAL PAIN: 0

## 2020-08-17 NOTE — PROGRESS NOTES
Chief Complaint   Patient presents with    Other     PP 2017 Possible Hernia       SUBJECTIVE:  HPI: Patient presents for evaluation of bilateral inguinal hernia and umbilical hernia. Symptoms were first noted 2 years ago. Pain is progressive and worsening . Lump is reducible. Pt denies nausea vomiting. Pt with no previous history of abdominal surgery. Patient's past medical history is complicated with recent bleeding stroke leaving evacuation. Patient is currently not on blood thinners. He has an early onset dementia. Patient has minimal discomfort with the hernias. He does complain of bloating. I have reviewed the patient's(pertinent information to this visit) medical history, family history(scanned in  theMedia tab under \"patient questioner\"), social history and review of systems with the patient today in the office.        Past Surgical History:   Procedure Laterality Date    BUNIONECTOMY      right great toe    COLONOSCOPY      JOINT REPLACEMENT      rigth total knee     Past Medical History:   Diagnosis Date    Arthritis     Former smoker 2/27/2019    GERD (gastroesophageal reflux disease)     Hx of fall     per old chart pt in ER 1/18/2019 after fall - fx 4 ribs and small hemothorax    Hyperlipidemia     Hypertension     Obstructive sleep apnea 2/27/2019    Pleural effusion     per old chart pt had CXR done 2/13/2019 noted large pleural effusion for thoracentesis 2/14/2019    Shortness of breath 2/27/2019    Unspecified cerebral artery occlusion with cerebral infarction 1997    no residual effects        Family History   Problem Relation Age of Onset    Clotting Disorder Mother     Heart Disease Mother     Heart Disease Father     Hypertension Maternal Grandfather      Social History     Socioeconomic History    Marital status: Single     Spouse name: Not on file    Number of children: Not on file    Years of education: Not on file    Highest education level: Not on file Occupational History    Not on file   Social Needs    Financial resource strain: Not on file    Food insecurity     Worry: Not on file     Inability: Not on file    Transportation needs     Medical: Not on file     Non-medical: Not on file   Tobacco Use    Smoking status: Former Smoker     Packs/day: 0.50     Years: 30.00     Pack years: 15.00     Types: Cigarettes     Last attempt to quit: 1/27/2010     Years since quitting: 10.5    Smokeless tobacco: Never Used   Substance and Sexual Activity    Alcohol use: Yes    Drug use: No    Sexual activity: Not on file   Lifestyle    Physical activity     Days per week: Not on file     Minutes per session: Not on file    Stress: Not on file   Relationships    Social connections     Talks on phone: Not on file     Gets together: Not on file     Attends Hinduism service: Not on file     Active member of club or organization: Not on file     Attends meetings of clubs or organizations: Not on file     Relationship status: Not on file    Intimate partner violence     Fear of current or ex partner: Not on file     Emotionally abused: Not on file     Physically abused: Not on file     Forced sexual activity: Not on file   Other Topics Concern    Not on file   Social History Narrative    Not on file       Current Outpatient Medications   Medication Sig Dispense Refill    tamsulosin (FLOMAX) 0.4 MG capsule Take 0.4 mg by mouth daily      citalopram (CELEXA) 10 MG tablet Take 1 tablet by mouth 2 times daily 60 tablet 0    atorvastatin (LIPITOR) 40 MG tablet Take 1 tablet by mouth nightly 30 tablet 0    amLODIPine (NORVASC) 10 MG tablet Take 1 tablet by mouth daily 30 tablet 0    folic acid (FOLVITE) 1 MG tablet Take 1 tablet by mouth daily 30 tablet 0    vitamin B-1 100 MG tablet Take 1 tablet by mouth daily 30 tablet 0    traMADol (ULTRAM) 50 MG tablet Take 50 mg by mouth every 6 hours as needed for Pain. Ana Rosa Mule docusate sodium (COLACE, DULCOLAX) 100 MG CAPS Take 100 mg by mouth 2 times daily 60 capsule 0    OXYGEN Inhale 2 L into the lungs nightly 1 Container 0    esomeprazole (NEXIUM) 40 MG delayed release capsule Take 40 mg by mouth every other day 01/16/19 Patient states he takes this every other day, took today      multivitamin [facility]) per tablet Take 1 tablet by mouth daily.  acetaminophen (TYLENOL) 325 MG tablet Take 500 mg by mouth 2 times daily as needed       gabapentin (NEURONTIN) 100 MG capsule Take 1 capsule by mouth 3 times daily for 30 days. 90 capsule 0     No current facility-administered medications for this visit. No Known Allergies    Review of Systems:         Review of Systems   Constitutional: Negative for chills and fever. HENT: Negative for ear pain, mouth sores, sore throat and tinnitus. Eyes: Negative for photophobia, redness and itching. Respiratory: Negative for apnea, choking and stridor. Cardiovascular: Negative for chest pain and palpitations. Gastrointestinal: Positive for abdominal pain. Negative for anal bleeding, constipation and rectal pain. Endocrine: Negative for polydipsia. Genitourinary: Negative for enuresis, flank pain and hematuria. Musculoskeletal: Negative for back pain, joint swelling and myalgias. Skin: Negative for color change and pallor. Allergic/Immunologic: Negative for environmental allergies. Neurological: Negative for syncope and speech difficulty. Psychiatric/Behavioral: Negative for confusion and hallucinations. OBJECTIVE:  Physical Exam:    /70   Pulse 74   Temp 96.7 °F (35.9 °C)   Ht 5' 11\" (1.803 m)   Wt 222 lb 9.6 oz (101 kg)   SpO2 94%   BMI 31.05 kg/m²      Physical Exam  Constitutional:       Appearance: He is well-developed. HENT:      Head: Normocephalic. Eyes:      Pupils: Pupils are equal, round, and reactive to light. Neck:      Musculoskeletal: Normal range of motion and neck supple.    Cardiovascular:      Rate and Rhythm: Normal rate. Pulmonary:      Effort: Pulmonary effort is normal.   Abdominal:      General: There is no distension. Palpations: Abdomen is soft. There is no mass. Tenderness: There is abdominal tenderness. There is no guarding or rebound. Hernia: A hernia (BIH and UH) is present. Musculoskeletal: Normal range of motion. Skin:     General: Skin is warm. Neurological:      Mental Status: He is alert and oriented to person, place, and time. ASSESSMENT:  1. Non-recurrent bilateral inguinal hernia without obstruction or gangrene          PLAN:  Treatment: Pt to see cardiology for the pericardial effusion. He will call us to proceed with repair for umbilical and BIHs. Patient counseled on risks, benefits, and alternatives of treatment plan at length. Patient states anunderstanding and willingness to proceed with plan. No orders of the defined types were placed in this encounter. No orders of the defined types were placed in this encounter. Follow Up:  No follow-ups on file.       Sosa Hoyt MD

## 2020-08-26 ENCOUNTER — TELEPHONE (OUTPATIENT)
Dept: CARDIOLOGY CLINIC | Age: 70
End: 2020-08-26

## 2020-09-04 ENCOUNTER — TELEPHONE (OUTPATIENT)
Dept: CARDIOLOGY CLINIC | Age: 70
End: 2020-09-04

## 2020-09-04 NOTE — TELEPHONE ENCOUNTER
Left message to call the office to schedule Echo test patient states call April Douglas to schedule future testing [phone].  States to call on Tuesday and prefer appointment after 10am

## 2020-09-08 ENCOUNTER — TELEPHONE (OUTPATIENT)
Dept: CARDIOLOGY CLINIC | Age: 70
End: 2020-09-08

## 2020-09-22 ENCOUNTER — PROCEDURE VISIT (OUTPATIENT)
Dept: CARDIOLOGY CLINIC | Age: 70
End: 2020-09-22
Payer: MEDICARE

## 2020-09-22 ENCOUNTER — NURSE ONLY (OUTPATIENT)
Dept: CARDIOLOGY CLINIC | Age: 70
End: 2020-09-22
Payer: MEDICARE

## 2020-09-22 LAB
LV EF: 53 %
LVEF MODALITY: NORMAL

## 2020-09-22 PROCEDURE — 93000 ELECTROCARDIOGRAM COMPLETE: CPT | Performed by: NURSE PRACTITIONER

## 2020-09-22 PROCEDURE — 99999 PR OFFICE/OUTPT VISIT,PROCEDURE ONLY: CPT | Performed by: NURSE PRACTITIONER

## 2020-09-22 PROCEDURE — 93306 TTE W/DOPPLER COMPLETE: CPT | Performed by: INTERNAL MEDICINE

## 2020-09-22 NOTE — PROGRESS NOTES
EKG completed and reviewed with Dr. Elvia Ernst who confirmed atrial fibrillation rate controlled. Patient is unable to take anticoagulation due to hemorrhagic stroke in the past.  Scheduled to follow-up with Dr. Deanna Childs tomorrow.

## 2020-09-23 ENCOUNTER — PROCEDURE VISIT (OUTPATIENT)
Dept: CARDIOLOGY CLINIC | Age: 70
End: 2020-09-23
Payer: MEDICARE

## 2020-09-23 ENCOUNTER — OFFICE VISIT (OUTPATIENT)
Dept: CARDIOLOGY CLINIC | Age: 70
End: 2020-09-23
Payer: MEDICARE

## 2020-09-23 VITALS
HEART RATE: 64 BPM | HEIGHT: 71 IN | WEIGHT: 217.9 LBS | SYSTOLIC BLOOD PRESSURE: 94 MMHG | DIASTOLIC BLOOD PRESSURE: 66 MMHG | BODY MASS INDEX: 30.51 KG/M2

## 2020-09-23 LAB
LV EF: 31 %
LVEF MODALITY: NORMAL

## 2020-09-23 PROCEDURE — 93018 CV STRESS TEST I&R ONLY: CPT | Performed by: INTERNAL MEDICINE

## 2020-09-23 PROCEDURE — 1123F ACP DISCUSS/DSCN MKR DOCD: CPT | Performed by: INTERNAL MEDICINE

## 2020-09-23 PROCEDURE — 4040F PNEUMOC VAC/ADMIN/RCVD: CPT | Performed by: INTERNAL MEDICINE

## 2020-09-23 PROCEDURE — 1036F TOBACCO NON-USER: CPT | Performed by: INTERNAL MEDICINE

## 2020-09-23 PROCEDURE — G8427 DOCREV CUR MEDS BY ELIG CLIN: HCPCS | Performed by: INTERNAL MEDICINE

## 2020-09-23 PROCEDURE — 99214 OFFICE O/P EST MOD 30 MIN: CPT | Performed by: INTERNAL MEDICINE

## 2020-09-23 PROCEDURE — A9500 TC99M SESTAMIBI: HCPCS | Performed by: INTERNAL MEDICINE

## 2020-09-23 PROCEDURE — 93016 CV STRESS TEST SUPVJ ONLY: CPT | Performed by: INTERNAL MEDICINE

## 2020-09-23 PROCEDURE — 3017F COLORECTAL CA SCREEN DOC REV: CPT | Performed by: INTERNAL MEDICINE

## 2020-09-23 PROCEDURE — 78452 HT MUSCLE IMAGE SPECT MULT: CPT | Performed by: INTERNAL MEDICINE

## 2020-09-23 PROCEDURE — G8417 CALC BMI ABV UP PARAM F/U: HCPCS | Performed by: INTERNAL MEDICINE

## 2020-09-23 PROCEDURE — 93017 CV STRESS TEST TRACING ONLY: CPT | Performed by: INTERNAL MEDICINE

## 2020-09-23 RX ORDER — RIVASTIGMINE 9.5 MG/24H
1 PATCH, EXTENDED RELEASE TRANSDERMAL DAILY
COMMUNITY

## 2020-09-23 NOTE — PROGRESS NOTES
CARDIOLOGY NOTE      9/23/2020    RE: Edil Palmer  (1950)                               TO:  Dr. Pato Bryant MD            CHIEF COMPLAINT   Cary Amador is a 72 y.o. male who was seen today for management of  A fib                 Here for FU on echo                   HPI:                   The patient does not have cardiac complaints  H/o htn, hyperlipidimea, IC bleed 3/20,   Has been having increasing weakness and sob for a few days  Patient also seen  for    - Hypertension,is  well controlled, pt is  compliant with medicines  - Hyperlipidimea, importance of hyperlipidimea discussed with pt. - Atrial fibrillation, pt is  compliant with meds. 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     Essential hypertension     Hyperlipidemia     Gait disturbance     CVA, old, disturbances of vision 03/11/2020    Former smoker 02/27/2019    Shortness of breath 02/27/2019    Obstructive sleep apnea 02/27/2019    Rotator cuff strain, left, initial encounter 02/14/2019    Hemothorax on right 02/13/2019    Falls frequently 02/13/2019    Hemothorax 01/16/2019     Current Outpatient Medications   Medication Sig Dispense Refill    rivastigmine (EXELON) 9.5 MG/24HR Place 1 patch onto the skin daily      tamsulosin (FLOMAX) 0.4 MG capsule Take 0.4 mg by mouth daily      gabapentin (NEURONTIN) 100 MG capsule Take 1 capsule by mouth 3 times daily for 30 days. (Patient taking differently: Take 100 mg by mouth 2 times daily.  1 in the A.M. 2 in the P.M.) 90 capsule 0    citalopram (CELEXA) 10 MG tablet Take 1 tablet by mouth 2 times daily 60 tablet 0    atorvastatin (LIPITOR) 40 MG tablet Take 1 tablet by mouth nightly 30 tablet 0    amLODIPine (NORVASC) 10 MG tablet Take 1 tablet by mouth daily 30 tablet 0    traMADol (ULTRAM) 50 MG tablet Take 50 mg by mouth every 6 hours as needed for Pain. José Luis Arsenio esomeprazole (NEXIUM) 40 MG delayed release capsule Take 40 mg by mouth every other day 01/16/19 Patient states he takes this every other day, took today      acetaminophen (TYLENOL) 325 MG tablet Take 500 mg by mouth 2 times daily as needed       OXYGEN Inhale 2 L into the lungs nightly (Patient not taking: Reported on 9/23/2020) 1 Container 0    multivitamin (THERAGRAN) per tablet Take 1 tablet by mouth daily. No current facility-administered medications for this visit. Allergies: Patient has no known allergies. Past Medical History:   Diagnosis Date    Arthritis     Former smoker 2/27/2019    GERD (gastroesophageal reflux disease)     H/O echocardiogram 09/22/2020    EF 50-55%, There is a small (trivial) posterior pericardial effusion.  No change since last echo 3/4/20, Mod LVH, Mild MR & TR.    Hx of fall     per old chart pt in ER 1/18/2019 after fall - fx 4 ribs and small hemothorax    Hyperlipidemia     Hypertension     Obstructive sleep apnea 2/27/2019    Pleural effusion     per old chart pt had CXR done 2/13/2019 noted large pleural effusion for thoracentesis 2/14/2019    Shortness of breath 2/27/2019    Unspecified cerebral artery occlusion with cerebral infarction 1997    no residual effects     Past Surgical History:   Procedure Laterality Date    BUNIONECTOMY      right great toe    COLONOSCOPY      JOINT REPLACEMENT      rigth total knee      As reviewed   Family History   Problem Relation Age of Onset    Clotting Disorder Mother     Heart Disease Mother     Heart Disease Father     Hypertension Maternal Grandfather      Social History     Tobacco Use  Smoking status: Former Smoker     Packs/day: 0.50     Years: 30.00     Pack years: 15.00     Types: Cigarettes     Last attempt to quit: 1/27/2010     Years since quitting: 10.6    Smokeless tobacco: Never Used   Substance Use Topics    Alcohol use: Yes     Comment: caffeine 2-3 pops a day      Review of Systems:    Constitutional: Negative for diaphoresis and fatigue  Psychological:Negative for anxiety or depression  HENT: Negative for headaches, nasal congestion, sinus pain or vertigo  Eyes: Negative for visual disturbance. Endocrine: Negative for polydipsia/polyuria  Respiratory: Negative for shortness of breath  Cardiovascular: SOB  Gastrointestinal: Negative for abdominal pain or heartburn  Genito-Urinary: Negative for urinary frequency/urgency  Musculoskeletal: Negative for muscle pain, muscular weakness, negative for pain in arm and leg or swelling in foot and leg  Neurological: Negative for dizziness, headaches, memory loss, numbness/tingling, visual changes, syncope  Dermatological: Negative for rash    Objective:    Vitals:    09/23/20 1237   BP: 94/66   Pulse: 64   Weight: 217 lb 14.4 oz (98.8 kg)   Height: 5' 11\" (1.803 m)     BP 94/66   Pulse 64 Comment: skipped  Ht 5' 11\" (1.803 m)   Wt 217 lb 14.4 oz (98.8 kg)   BMI 30.39 kg/m²     No flowsheet data found. Wt Readings from Last 3 Encounters:   09/23/20 217 lb 14.4 oz (98.8 kg)   08/17/20 222 lb 9.6 oz (101 kg)   06/16/20 218 lb (98.9 kg)     Body mass index is 30.39 kg/m². GENERAL - Alert, oriented, pleasant, in no apparent distress. EYES: No jaundice, no conjunctival pallor. SKIN: It is warm & dry. No rashes. No Echhymosis    HEENT - No clinically significant abnormalities seen. Neck - Supple. No jugular venous distention noted. No carotid bruits. Cardiovascular - Normal S1 and S2 without obvious murmur or gallop. Extremities - No cyanosis, clubbing, or significant edema. Pulmonary - No respiratory distress.   No wheezes or rales. Abdomen - No masses, tenderness, or organomegaly. Musculoskeletal - No significant edema. No joint deformities. No muscle wasting. Neurologic - Cranial nerves II through XII are grossly intact. There were no gross focal neurologic abnormalities. Lab Review   Lab Results   Component Value Date    TROPONINT 0.011 03/02/2020     BNP:  No results found for: BNP  PT/INR:    Lab Results   Component Value Date    INR 0.92 03/02/2020     Lab Results   Component Value Date    LABA1C 5.7 07/29/2012     Lab Results   Component Value Date    WBC 6.3 03/02/2020    HCT 45.2 03/02/2020    MCV 81.4 03/02/2020     03/02/2020     Lab Results   Component Value Date    CHOL 178 07/29/2012    TRIG 73 07/29/2012    HDL 59 (L) 07/29/2012    LDLDIRECT 115 (H) 07/29/2012     Lab Results   Component Value Date    ALT 20 03/02/2020    AST 23 03/02/2020     BMP:    Lab Results   Component Value Date     03/02/2020    K 4.0 03/02/2020    CL 97 03/02/2020    CO2 28 03/02/2020    BUN 10 03/02/2020    CREATININE 1.0 07/30/2020    CREATININE 0.8 03/02/2020     CMP:   Lab Results   Component Value Date     03/02/2020    K 4.0 03/02/2020    CL 97 03/02/2020    CO2 28 03/02/2020    BUN 10 03/02/2020    PROT 7.6 03/02/2020    PROT 7.5 07/29/2012     TSH:    Lab Results   Component Value Date    TSHHS 1.853 07/29/2012         Impression:    1.  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 Patient does not have symptoms from atrial fibrillation  EP consult    -  LIPID MANAGEMENT:  Importance of lipid levels discussed with patient   and patient was given dietary advice. NCEP- ATP III guidelines reviewed with patient. -   Changes  in medicines made: No       -  Hypertension: Patients blood pressure is normal. Patient is advised about low sodium diet. Present medical regimen will not be changed.                                                           Jess Louis MD    [facility] [place]

## 2020-09-25 ENCOUNTER — INITIAL CONSULT (OUTPATIENT)
Dept: CARDIOLOGY CLINIC | Age: 70
End: 2020-09-25
Payer: MEDICARE

## 2020-09-25 VITALS
RESPIRATION RATE: 20 BRPM | DIASTOLIC BLOOD PRESSURE: 56 MMHG | BODY MASS INDEX: 30.52 KG/M2 | HEIGHT: 71 IN | HEART RATE: 87 BPM | SYSTOLIC BLOOD PRESSURE: 98 MMHG | OXYGEN SATURATION: 90 % | WEIGHT: 218 LBS | TEMPERATURE: 97.2 F

## 2020-09-25 PROCEDURE — 1036F TOBACCO NON-USER: CPT | Performed by: INTERNAL MEDICINE

## 2020-09-25 PROCEDURE — G8427 DOCREV CUR MEDS BY ELIG CLIN: HCPCS | Performed by: INTERNAL MEDICINE

## 2020-09-25 PROCEDURE — 93000 ELECTROCARDIOGRAM COMPLETE: CPT | Performed by: INTERNAL MEDICINE

## 2020-09-25 PROCEDURE — G8417 CALC BMI ABV UP PARAM F/U: HCPCS | Performed by: INTERNAL MEDICINE

## 2020-09-25 PROCEDURE — 4040F PNEUMOC VAC/ADMIN/RCVD: CPT | Performed by: INTERNAL MEDICINE

## 2020-09-25 PROCEDURE — 3017F COLORECTAL CA SCREEN DOC REV: CPT | Performed by: INTERNAL MEDICINE

## 2020-09-25 PROCEDURE — 1123F ACP DISCUSS/DSCN MKR DOCD: CPT | Performed by: INTERNAL MEDICINE

## 2020-09-25 PROCEDURE — 99203 OFFICE O/P NEW LOW 30 MIN: CPT | Performed by: INTERNAL MEDICINE

## 2020-09-25 RX ORDER — ASPIRIN 81 MG/1
81 TABLET ORAL DAILY
Qty: 90 TABLET | Refills: 1 | Status: SHIPPED | OUTPATIENT
Start: 2020-09-25

## 2020-09-25 NOTE — PROGRESS NOTES
Electrophysiology Consult Note      Reason for consultation: ATRIAL FIBRILLATION    Chief complaint : TIREDNESS    Referring physician: Alexx Galaviz      Primary care physician: Chirag Avila MD      History of Present Illness:     Chief Complaint   Patient presents with    New Patient     referral from Dr. Emilia Wright for afib management, new onset. PCP Dr. Mag Reddy.  Atrial Fibrillation     denies chest pain, sob, dizziness, syncope. no edema. reports tired all the time. Patient is unaware of when he goes in and out of rhythm.  Other     Hx hemmoragic CVA 2/2020, Not currently on anticoagulation. Hx sleep apnea but does not use CPAP    Fatigue    Discuss Medications     Daughter April is a nurse and would like to discuss \"baby Aspirin\".  Tachycardia     Daughter April reports patient has Hx of 6 beat run of Vtach       Pastmedical history:   Past Medical History:   Diagnosis Date    Arthritis     Former smoker 2/27/2019    GERD (gastroesophageal reflux disease)     H/O echocardiogram 09/22/2020    EF 50-55%, There is a small (trivial) posterior pericardial effusion.  No change since last echo 3/4/20, Mod LVH, Mild MR & TR.    Hx of fall     per old chart pt in ER 1/18/2019 after fall - fx 4 ribs and small hemothorax    Hyperlipidemia     Hypertension     Obstructive sleep apnea 2/27/2019    Pleural effusion     per old chart pt had CXR done 2/13/2019 noted large pleural effusion for thoracentesis 2/14/2019    Shortness of breath 2/27/2019    Unspecified cerebral artery occlusion with cerebral infarction 1997    no residual effects       Surgical history :   Past Surgical History:   Procedure Laterality Date    BUNIONECTOMY      right great toe    COLONOSCOPY      JOINT REPLACEMENT      rigth total knee       Family history:   Family History   Problem Relation Age of Onset    Clotting Disorder Mother     Heart Disease Mother     Heart Disease Father     Hypertension Maternal Grandfather        Social history :  reports that he quit smoking about 10 years ago. His smoking use included cigarettes. He has a 15.00 pack-year smoking history. He has never used smokeless tobacco. He reports previous alcohol use. He reports that he does not use drugs. No Known Allergies    Current Outpatient Medications on File Prior to Visit   Medication Sig Dispense Refill    rivastigmine (EXELON) 9.5 MG/24HR Place 1 patch onto the skin daily      tamsulosin (FLOMAX) 0.4 MG capsule Take 0.4 mg by mouth daily      citalopram (CELEXA) 10 MG tablet Take 1 tablet by mouth 2 times daily 60 tablet 0    atorvastatin (LIPITOR) 40 MG tablet Take 1 tablet by mouth nightly 30 tablet 0    amLODIPine (NORVASC) 10 MG tablet Take 1 tablet by mouth daily 30 tablet 0    traMADol (ULTRAM) 50 MG tablet Take 50 mg by mouth every 6 hours as needed for Pain. Melene Liter esomeprazole (NEXIUM) 40 MG delayed release capsule Take 40 mg by mouth every other day 01/16/19 Patient states he takes this every other day, took today      acetaminophen (TYLENOL) 325 MG tablet Take 500 mg by mouth 2 times daily as needed       gabapentin (NEURONTIN) 100 MG capsule Take 1 capsule by mouth 3 times daily for 30 days. (Patient taking differently: Take 100 mg by mouth 2 times daily. 1 in the A.M. 2 in the P.M.) 90 capsule 0    OXYGEN Inhale 2 L into the lungs nightly (Patient not taking: Reported on 9/25/2020) 1 Container 0    multivitamin (THERAGRAN) per tablet Take 1 tablet by mouth daily. No current facility-administered medications on file prior to visit. Review of Systems:   Review of Systems   Constitutional: Positive for fatigue. Negative for activity change, chills and fever. HENT: Negative for congestion, ear pain and tinnitus. Eyes: Negative for photophobia, pain and visual disturbance. Respiratory: Negative for cough, chest tightness, shortness of breath and wheezing. daughter does not want to take risk. Thanks again for allowing me to participate in care of this patient. Please call me if you have any questions. With best regards. Chelita Martinez MD, 9/25/2020 11:28 AM     Please note this report has been partially produced using speech recognition software and may contain errors related to that system including errors in grammar, punctuation, and spelling, as well as words and phrases that may be inappropriate. If there are any questions or concerns please feel free to contact the dictating provider for clarification.

## 2020-09-28 ENCOUNTER — TELEPHONE (OUTPATIENT)
Dept: CARDIOLOGY CLINIC | Age: 70
End: 2020-09-28

## 2020-09-28 NOTE — TELEPHONE ENCOUNTER
CHAD on April's . Patient had EP consult on Friday. F/u OV with Lulú scheduled for 10/21. Can move sooner after Lulú's GC week. Supervising physician Dr. Fatuma Callahan . Normal tracer uptake in all segments of myocardium on stress ans rest images. Left ventricular global function is moderately reduced suggestive of nonischemic cardiomyopathy. Normal Lexiscan nuclear scintigraphic study suggestive of normal myocardial perfusion. Gated images demonstrate abnormal left ventricular systolic function with EF of 31 %. Recommendation Suggest office visit to discuss results .

## 2020-10-11 ASSESSMENT — ENCOUNTER SYMPTOMS
CONSTIPATION: 0
WHEEZING: 0
DIARRHEA: 0
EYE PAIN: 0
VOMITING: 0
ABDOMINAL PAIN: 0
BLOOD IN STOOL: 0
SHORTNESS OF BREATH: 0
COUGH: 0
NAUSEA: 0
PHOTOPHOBIA: 0
COLOR CHANGE: 0
CHEST TIGHTNESS: 0
BACK PAIN: 0

## 2020-12-09 ENCOUNTER — HOSPITAL ENCOUNTER (OUTPATIENT)
Dept: SLEEP CENTER | Age: 70
Discharge: HOME | End: 2020-12-09
Payer: MEDICARE

## 2020-12-09 VITALS — BODY MASS INDEX: 30.52 KG/M2 | HEIGHT: 71 IN | WEIGHT: 218 LBS

## 2020-12-09 PROCEDURE — 95810 POLYSOM 6/> YRS 4/> PARAM: CPT

## 2020-12-15 LAB — STATUS: NORMAL

## 2022-12-05 ENCOUNTER — HOSPITAL ENCOUNTER (OUTPATIENT)
Age: 72
Discharge: HOME | DRG: 286 | End: 2022-12-05
Payer: MEDICARE

## 2022-12-05 LAB
ANION GAP SERPL CALCULATED.3IONS-SCNC: 9 MMOL/L (ref 4–16)
APTT: 38.9 SECONDS (ref 25.1–37.1)
BASOPHILS ABSOLUTE: 0 K/CU MM
BASOPHILS RELATIVE PERCENT: 0.2 % (ref 0–1)
BUN BLDV-MCNC: 12 MG/DL (ref 6–23)
CALCIUM SERPL-MCNC: 9 MG/DL (ref 8.3–10.6)
CHLORIDE BLD-SCNC: 102 MMOL/L (ref 99–110)
CO2: 30 MMOL/L (ref 21–32)
CREAT SERPL-MCNC: 0.7 MG/DL (ref 0.9–1.3)
DIFFERENTIAL TYPE: ABNORMAL
EOSINOPHILS ABSOLUTE: 0 K/CU MM
EOSINOPHILS RELATIVE PERCENT: 0.9 % (ref 0–3)
GFR SERPL CREATININE-BSD FRML MDRD: >60 ML/MIN/1.73M2
GLUCOSE BLD-MCNC: 92 MG/DL (ref 70–99)
HCT VFR BLD CALC: 44.5 % (ref 42–52)
HEMOGLOBIN: 13.6 GM/DL (ref 13.5–18)
IMMATURE NEUTROPHIL %: 0.2 % (ref 0–0.43)
INR BLD: 1.51 INDEX
LYMPHOCYTES ABSOLUTE: 0.9 K/CU MM
LYMPHOCYTES RELATIVE PERCENT: 21.4 % (ref 24–44)
MCH RBC QN AUTO: 30 PG (ref 27–31)
MCHC RBC AUTO-ENTMCNC: 30.6 % (ref 32–36)
MCV RBC AUTO: 98.2 FL (ref 78–100)
MONOCYTES ABSOLUTE: 0.6 K/CU MM
MONOCYTES RELATIVE PERCENT: 13.8 % (ref 0–4)
NUCLEATED RBC %: 0 %
PDW BLD-RTO: 14 % (ref 11.7–14.9)
PLATELET # BLD: 146 K/CU MM (ref 140–440)
PMV BLD AUTO: 10.9 FL (ref 7.5–11.1)
POTASSIUM SERPL-SCNC: 4.8 MMOL/L (ref 3.5–5.1)
PROTHROMBIN TIME: 19.6 SECONDS (ref 11.7–14.5)
RBC # BLD: 4.53 M/CU MM (ref 4.6–6.2)
SEGMENTED NEUTROPHILS ABSOLUTE COUNT: 2.7 K/CU MM
SEGMENTED NEUTROPHILS RELATIVE PERCENT: 63.5 % (ref 36–66)
SODIUM BLD-SCNC: 141 MMOL/L (ref 135–145)
TOTAL IMMATURE NEUTOROPHIL: 0.01 K/CU MM
TOTAL NUCLEATED RBC: 0 K/CU MM
WBC # BLD: 4.3 K/CU MM (ref 4–10.5)

## 2022-12-05 PROCEDURE — 80048 BASIC METABOLIC PNL TOTAL CA: CPT

## 2022-12-05 PROCEDURE — 36415 COLL VENOUS BLD VENIPUNCTURE: CPT

## 2022-12-05 PROCEDURE — 85610 PROTHROMBIN TIME: CPT

## 2022-12-05 PROCEDURE — 85730 THROMBOPLASTIN TIME PARTIAL: CPT

## 2022-12-05 PROCEDURE — 85025 COMPLETE CBC W/AUTO DIFF WBC: CPT

## 2022-12-06 ENCOUNTER — APPOINTMENT (OUTPATIENT)
Dept: CT IMAGING | Age: 72
DRG: 286 | End: 2022-12-06
Attending: INTERNAL MEDICINE
Payer: MEDICARE

## 2022-12-06 ENCOUNTER — HOSPITAL ENCOUNTER (INPATIENT)
Dept: CARDIAC CATH/INVASIVE PROCEDURES | Age: 72
LOS: 1 days | Discharge: HOME HEALTH | DRG: 286 | End: 2022-12-07
Attending: INTERNAL MEDICINE | Admitting: INTERNAL MEDICINE
Payer: MEDICARE

## 2022-12-06 LAB
ALBUMIN SERPL-MCNC: 4 GM/DL (ref 3.4–5)
ALP BLD-CCNC: 113 IU/L (ref 40–129)
ALT SERPL-CCNC: 16 U/L (ref 10–40)
AST SERPL-CCNC: 26 IU/L (ref 15–37)
BASE EXCESS MIXED: 3.8 (ref 0–1.2)
BILIRUB SERPL-MCNC: 0.7 MG/DL (ref 0–1)
BILIRUBIN DIRECT: 0.2 MG/DL (ref 0–0.3)
BILIRUBIN, INDIRECT: 0.5 MG/DL (ref 0–0.7)
CARBON MONOXIDE, BLOOD: 3.2 % (ref 0–5)
CO2 CONTENT: 32.6 MMOL/L (ref 19–24)
COMMENT: ABNORMAL
HCO3 ARTERIAL: 30.9 MMOL/L (ref 18–23)
METHEMOGLOBIN ARTERIAL: 1.4 %
O2 SATURATION: 82.5 % (ref 96–97)
PCO2 ARTERIAL: 56 MMHG (ref 32–45)
PH BLOOD: 7.35 (ref 7.34–7.45)
PO2 ARTERIAL: 56 MMHG (ref 75–100)
T4 FREE: 1.07 NG/DL (ref 0.9–1.8)
TOTAL PROTEIN: 6.4 GM/DL (ref 6.4–8.2)
TSH HIGH SENSITIVITY: 2.85 UIU/ML (ref 0.27–4.2)

## 2022-12-06 PROCEDURE — 2580000003 HC RX 258: Performed by: INTERNAL MEDICINE

## 2022-12-06 PROCEDURE — 93460 R&L HRT ART/VENTRICLE ANGIO: CPT

## 2022-12-06 PROCEDURE — 4A023N6 MEASUREMENT OF CARDIAC SAMPLING AND PRESSURE, RIGHT HEART, PERCUTANEOUS APPROACH: ICD-10-PCS | Performed by: INTERNAL MEDICINE

## 2022-12-06 PROCEDURE — C1887 CATHETER, GUIDING: HCPCS

## 2022-12-06 PROCEDURE — 2700000000 HC OXYGEN THERAPY PER DAY

## 2022-12-06 PROCEDURE — 84439 ASSAY OF FREE THYROXINE: CPT

## 2022-12-06 PROCEDURE — 80076 HEPATIC FUNCTION PANEL: CPT

## 2022-12-06 PROCEDURE — 6360000002 HC RX W HCPCS

## 2022-12-06 PROCEDURE — 94761 N-INVAS EAR/PLS OXIMETRY MLT: CPT

## 2022-12-06 PROCEDURE — 82803 BLOOD GASES ANY COMBINATION: CPT

## 2022-12-06 PROCEDURE — 6370000000 HC RX 637 (ALT 250 FOR IP): Performed by: PHYSICIAN ASSISTANT

## 2022-12-06 PROCEDURE — 71260 CT THORAX DX C+: CPT | Performed by: INTERNAL MEDICINE

## 2022-12-06 PROCEDURE — 84443 ASSAY THYROID STIM HORMONE: CPT

## 2022-12-06 PROCEDURE — C1751 CATH, INF, PER/CENT/MIDLINE: HCPCS

## 2022-12-06 PROCEDURE — 2709999900 HC NON-CHARGEABLE SUPPLY

## 2022-12-06 PROCEDURE — B2111ZZ FLUOROSCOPY OF MULTIPLE CORONARY ARTERIES USING LOW OSMOLAR CONTRAST: ICD-10-PCS | Performed by: INTERNAL MEDICINE

## 2022-12-06 PROCEDURE — 6370000000 HC RX 637 (ALT 250 FOR IP): Performed by: INTERNAL MEDICINE

## 2022-12-06 PROCEDURE — 6360000004 HC RX CONTRAST MEDICATION

## 2022-12-06 PROCEDURE — 6360000004 HC RX CONTRAST MEDICATION: Performed by: INTERNAL MEDICINE

## 2022-12-06 PROCEDURE — C1894 INTRO/SHEATH, NON-LASER: HCPCS

## 2022-12-06 PROCEDURE — 2140000000 HC CCU INTERMEDIATE R&B

## 2022-12-06 PROCEDURE — 1200000000 HC SEMI PRIVATE

## 2022-12-06 RX ORDER — ASPIRIN 81 MG/1
81 TABLET ORAL DAILY
Status: DISCONTINUED | OUTPATIENT
Start: 2022-12-07 | End: 2022-12-07 | Stop reason: HOSPADM

## 2022-12-06 RX ORDER — DIAZEPAM 5 MG/1
5 TABLET ORAL ONCE
Status: COMPLETED | OUTPATIENT
Start: 2022-12-06 | End: 2022-12-06

## 2022-12-06 RX ORDER — ATROPINE SULFATE 0.4 MG/ML
0.5 AMPUL (ML) INJECTION
Status: ACTIVE | OUTPATIENT
Start: 2022-12-06 | End: 2022-12-07

## 2022-12-06 RX ORDER — ESOMEPRAZOLE MAGNESIUM 40 MG/1
40 CAPSULE, DELAYED RELEASE ORAL EVERY OTHER DAY
Status: DISCONTINUED | OUTPATIENT
Start: 2022-12-06 | End: 2022-12-06

## 2022-12-06 RX ORDER — SODIUM CHLORIDE 9 MG/ML
INJECTION, SOLUTION INTRAVENOUS CONTINUOUS
Status: DISCONTINUED | OUTPATIENT
Start: 2022-12-06 | End: 2022-12-06

## 2022-12-06 RX ORDER — TAMSULOSIN HYDROCHLORIDE 0.4 MG/1
0.4 CAPSULE ORAL DAILY
Status: DISCONTINUED | OUTPATIENT
Start: 2022-12-06 | End: 2022-12-07 | Stop reason: HOSPADM

## 2022-12-06 RX ORDER — GABAPENTIN 100 MG/1
100 CAPSULE ORAL 2 TIMES DAILY
Status: DISCONTINUED | OUTPATIENT
Start: 2022-12-06 | End: 2022-12-07 | Stop reason: HOSPADM

## 2022-12-06 RX ORDER — FUROSEMIDE 40 MG/1
40 TABLET ORAL DAILY
Qty: 30 TABLET | Refills: 5 | Status: SHIPPED | OUTPATIENT
Start: 2022-12-06

## 2022-12-06 RX ORDER — FUROSEMIDE 20 MG/1
40 TABLET ORAL DAILY
Status: DISCONTINUED | OUTPATIENT
Start: 2022-12-07 | End: 2022-12-07 | Stop reason: HOSPADM

## 2022-12-06 RX ORDER — TRAMADOL HYDROCHLORIDE 50 MG/1
50 TABLET ORAL DAILY PRN
Status: DISCONTINUED | OUTPATIENT
Start: 2022-12-06 | End: 2022-12-07 | Stop reason: HOSPADM

## 2022-12-06 RX ORDER — ATORVASTATIN CALCIUM 40 MG/1
40 TABLET, FILM COATED ORAL NIGHTLY
Status: DISCONTINUED | OUTPATIENT
Start: 2022-12-06 | End: 2022-12-07 | Stop reason: HOSPADM

## 2022-12-06 RX ORDER — SODIUM CHLORIDE 0.9 % (FLUSH) 0.9 %
5-40 SYRINGE (ML) INJECTION PRN
Status: DISCONTINUED | OUTPATIENT
Start: 2022-12-06 | End: 2022-12-07 | Stop reason: HOSPADM

## 2022-12-06 RX ORDER — DIPHENHYDRAMINE HCL 25 MG
25 TABLET ORAL ONCE
Status: COMPLETED | OUTPATIENT
Start: 2022-12-06 | End: 2022-12-06

## 2022-12-06 RX ORDER — SODIUM CHLORIDE 9 MG/ML
INJECTION, SOLUTION INTRAVENOUS PRN
Status: DISCONTINUED | OUTPATIENT
Start: 2022-12-06 | End: 2022-12-07 | Stop reason: HOSPADM

## 2022-12-06 RX ORDER — ACETAMINOPHEN 325 MG/1
650 TABLET ORAL EVERY 4 HOURS PRN
Status: DISCONTINUED | OUTPATIENT
Start: 2022-12-06 | End: 2022-12-07 | Stop reason: HOSPADM

## 2022-12-06 RX ORDER — PANTOPRAZOLE SODIUM 40 MG/1
40 TABLET, DELAYED RELEASE ORAL EVERY OTHER DAY
Status: DISCONTINUED | OUTPATIENT
Start: 2022-12-06 | End: 2022-12-07 | Stop reason: HOSPADM

## 2022-12-06 RX ORDER — CITALOPRAM 20 MG/1
10 TABLET ORAL 2 TIMES DAILY
Status: DISCONTINUED | OUTPATIENT
Start: 2022-12-06 | End: 2022-12-07 | Stop reason: HOSPADM

## 2022-12-06 RX ORDER — SODIUM CHLORIDE 0.9 % (FLUSH) 0.9 %
5-40 SYRINGE (ML) INJECTION EVERY 12 HOURS SCHEDULED
Status: DISCONTINUED | OUTPATIENT
Start: 2022-12-06 | End: 2022-12-07 | Stop reason: HOSPADM

## 2022-12-06 RX ADMIN — SODIUM CHLORIDE, PRESERVATIVE FREE 10 ML: 5 INJECTION INTRAVENOUS at 22:01

## 2022-12-06 RX ADMIN — SODIUM CHLORIDE, PRESERVATIVE FREE 10 ML: 5 INJECTION INTRAVENOUS at 14:11

## 2022-12-06 RX ADMIN — DIAZEPAM 5 MG: 5 TABLET ORAL at 07:39

## 2022-12-06 RX ADMIN — SODIUM CHLORIDE: 9 INJECTION, SOLUTION INTRAVENOUS at 07:39

## 2022-12-06 RX ADMIN — GABAPENTIN 100 MG: 100 CAPSULE ORAL at 22:01

## 2022-12-06 RX ADMIN — CITALOPRAM HYDROBROMIDE 10 MG: 20 TABLET ORAL at 22:01

## 2022-12-06 RX ADMIN — TRAMADOL HYDROCHLORIDE 50 MG: 50 TABLET, COATED ORAL at 14:08

## 2022-12-06 RX ADMIN — DIPHENHYDRAMINE HYDROCHLORIDE 25 MG: 25 TABLET ORAL at 07:39

## 2022-12-06 RX ADMIN — ATORVASTATIN CALCIUM 40 MG: 40 TABLET, FILM COATED ORAL at 22:01

## 2022-12-06 RX ADMIN — TAMSULOSIN HYDROCHLORIDE 0.4 MG: 0.4 CAPSULE ORAL at 14:08

## 2022-12-06 RX ADMIN — PANTOPRAZOLE SODIUM 40 MG: 40 TABLET, DELAYED RELEASE ORAL at 14:08

## 2022-12-06 RX ADMIN — IOPAMIDOL 75 ML: 755 INJECTION, SOLUTION INTRAVENOUS at 17:30

## 2022-12-06 ASSESSMENT — PAIN DESCRIPTION - LOCATION
LOCATION: HAND
LOCATION: HAND

## 2022-12-06 ASSESSMENT — PAIN DESCRIPTION - DESCRIPTORS
DESCRIPTORS: ACHING
DESCRIPTORS: ACHING

## 2022-12-06 ASSESSMENT — PAIN SCALES - GENERAL
PAINLEVEL_OUTOF10: 3
PAINLEVEL_OUTOF10: 6

## 2022-12-06 ASSESSMENT — PAIN DESCRIPTION - ONSET: ONSET: ON-GOING

## 2022-12-06 ASSESSMENT — PAIN DESCRIPTION - ORIENTATION
ORIENTATION: RIGHT;LEFT
ORIENTATION: RIGHT

## 2022-12-06 ASSESSMENT — PAIN DESCRIPTION - PAIN TYPE: TYPE: ACUTE PAIN

## 2022-12-06 ASSESSMENT — PAIN - FUNCTIONAL ASSESSMENT
PAIN_FUNCTIONAL_ASSESSMENT: ACTIVITIES ARE NOT PREVENTED
PAIN_FUNCTIONAL_ASSESSMENT: ACTIVITIES ARE NOT PREVENTED

## 2022-12-06 ASSESSMENT — PAIN DESCRIPTION - FREQUENCY: FREQUENCY: CONTINUOUS

## 2022-12-06 NOTE — PROGRESS NOTES
Received from cath lab. Monitor and alarms on. Call Light in reach. Bed in the lowest position. Family at bedside. Procedure site assessment completed per Zandra Combs RT and Luis Daniel Vasquez RN. No hematoma or bleeding noted.

## 2022-12-06 NOTE — PLAN OF CARE
Problem: Chronic Conditions and Co-morbidities  Goal: Patient's chronic conditions and co-morbidity symptoms are monitored and maintained or improved  12/6/2022 1743 by Mo Ugalde RN  Outcome: Progressing  12/6/2022 1613 by Mo Ugalde RN  Outcome: Progressing     Problem: Discharge Planning  Goal: Discharge to home or other facility with appropriate resources  12/6/2022 1743 by Mo Ugalde RN  Outcome: Progressing  12/6/2022 1613 by Mo Ugalde RN  Outcome: Progressing     Problem: Safety - Adult  Goal: Free from fall injury  12/6/2022 1743 by Mo Ugalde RN  Outcome: Progressing  12/6/2022 1613 by Mo Ugalde RN  Outcome: Progressing     Problem: Pain  Goal: Verbalizes/displays adequate comfort level or baseline comfort level  12/6/2022 1743 by Mo Ugalde RN  Outcome: Progressing  12/6/2022 1613 by Mo Ugalde RN  Outcome: Progressing

## 2022-12-06 NOTE — H&P
[address] [place] [address], [address] [place] [address]                              HISTORY AND PHYSICAL    PATIENT NAME: Chris Rogers                      :        1950  MED REC NO:   [id number]                          ROOM:  ACCOUNT NO:   [de-identified]                           ADMIT DATE: 2022  PROVIDER:     Eliana Bryan MD    INDICATIONS:  The patient here for right and left heart catheterization. HISTORY OF PRESENT ILLNESS:  This is a 73-year-old male patient of Dr. Hector Ozuna. The patient has been having shortness of breath present. The patient has atrial fibrillation noted and history of stroke present,  but he is functional.  He is on anticoagulation also. The patient was  found to have nonsustained ventricular tachycardia also present. The patient had an echo done in 2022. LV dysfunction was present. EF was 22%. Moderate AR was noted and left atrium was dilated. The  patient's Holter monitor showed atrial fibrillation noted throughout the  scanning, 100%. The patient's stress test showed that EF was 25% with mild-to-moderate  reversible apical perfusion defect noted. PAST MEDICAL HISTORY:  History of having stroke present. He is having  hypertension and atrial fibrillation. PAST SURGICAL HISTORY:  Left knee surgery, right bunionectomy, and right  total knee replacement. SOCIAL HISTORY:  He does not smoke. He does not drink. MEDICATIONS AT HOME:  He is on aspirin 81 mg once a day, Coreg 6.25 mg  b.i.d., Celexa, Eliquis 5 mg b.i.d., Lasix 20 mg once and day,  Neurontin, and Lipitor. PHYSICAL EXAMINATION:  GENERAL:  The patient is awake, alert, and answering questions, not in  acute distress. VITAL SIGNS:  Temperature is afebrile. Pulse is 65, atrial  fibrillation. Blood pressure is 118/76. HEENT:  Head is atraumatic. Pupils are equal and reactive. CHEST:  Equal expansion.   LUNGS:  Clear to auscultation. No wheezing or rhonchi present. HEART:  Irregular rhythm. ABDOMEN:  Soft and nontender. Bowel sounds are present. No  hepatosplenomegaly or guarding appreciated. EXTREMITIES:  No cyanosis noted. NEUROLOGIC:  Cranial nerves are grossly intact. LABORATORY DATA:  BUN is 12, creatinine is 0.7. CBC is normal.    IMPRESSION AND PLAN:  This is a 18-year-old male patient, who has a  stroke and atrial fibrillation, on anticoagulation. His recent stress  shows significant LV dysfunction present. His EF is progressively  getting worse and he has never had a heart catheterization done. So, the plan is for right and left heart catheterization and we will  make further recommendations based on that. His echo was back in  09/2020. It showed LV dysfunction was preserved at that time. Now, EF  is down to 20%. We will make further recommendations based on heart  catheterization.         Dia Crespo MD    D: 12/06/2022 7:32:26       T: 12/06/2022 10:28:31     [id number]  Job#: [id number]     Doc#: [id number]    CC:

## 2022-12-06 NOTE — FLOWSHEET NOTE
Pt transported to 3115 on cart/monitor/O2 per RNx2.  Right radial/brachial sites free of bleeding/hematoma at time of transfer

## 2022-12-06 NOTE — H&P
Dictated  Right and left heart cath  No prior cath   Hx of afib and CVA  He is on [facility]  Stress test shows EF 25% with moderate reversible perfusion defect      No hx of diabetes  Hx of HTN/afib and CVA      Pcp DR. Jose Vo       Electronically signed by Moni Khan MD on 12/6/22 at 7:33 AM EST

## 2022-12-06 NOTE — PROGRESS NOTES
Outpatient Pharmacy Progress Note for Meds-to-Beds    Total number of Prescriptions Filled: 1  The following medications were dispensed to the patient during the discharge process:  Furosemide   Carvedilol  Stiolto Inhaler  Losartan    Additional Documentation: The following prescription(s) were refilled to soon per insurance (patient has some at home): Eliquis   Patient picked-up the medication(s) in the OP Pharmacy      Thank you for letting us serve your patients.   [facility]    [address], [address] [facility]    Phone: [phone]    Fax: [phone]

## 2022-12-06 NOTE — PROCEDURES
[address], [address] [place] [address]                            CARDIAC CATHETERIZATION    PATIENT NAME: Dennie Law                      :        1950  MED REC NO:   [id number]                          ROOM:  ACCOUNT NO:   [de-identified]                           ADMIT DATE: 2022  PROVIDER:     Jory Cabot, MD    DATE OF PROCEDURE:  2022    INDICATION:  Chest pain, shortness of breath, and heart failure. PROCEDURE:  This is a 71-year-old female patient, with a history of  stroke, hypertension, and atrial fibrillation present. The patient is  here for heart catheterization. The patient was brought to the cath lab today. Informed consent was  obtained from the patient. The patient was prepped and draped in the  usual sterile fashion. The patient was injected with 5 mL of 2%  lidocaine in the right radial artery region. Using a radial needle, the  right radial artery was cannulized. A 5/6-Beninese sheath was placed in  the right radial artery and a 5-Beninese sheath was placed in the right  brachial vein. Right heart catheterization was performed. RA pressure is 26/25 with a  mean of 22. RV pressure is 57/15 with a mean of 24. PA pressure is  55/27 with a mean of 39. Pulmonary capillary wedge pressure is 22/28  with a mean of 26 present. EDP is 25 mmHg present also. Using a TIG catheter, left coronary angiography was performed. The left  coronary angiogram revealed the left main is patent. It trifurcates  into the LAD, circ, and ramus branch. Circ is a large-sized vessel and it gives off the large OM1 branch. There was mild disease noted. Ramus is a medium-sized vessel, mild  disease noted. LAD is a medium-sized vessel and LAD has mild disease present. It gives  off the large septal branch. It gives off the small diagonal branch as  well. LAD reaches and wraps the apex.   There is a collateral  circulation filling the distal right coronary artery. It fills the PD  and the PL branch. Using an AL-1 catheter, right coronary angiography was performed. The  right coronary angiogram revealed the right coronary artery mid 100%  occluded with filling right-to-right and left-to-right collateral  circulation. A pigtail catheter was used to measure EDP. No gradient was noted on  the pullback. IMPRESSION:  1. Left main is patent. 2.  LAD has mild disease present. 3.  Ramus has mild disease noted. 4.  Circ has mild disease noted. 5.  Right coronary artery is proximally 100% occluded with  right-to-right and left-to-right collateral circulation present. 6.  EDP is around 25 mmHg present. 7.  Right heart catheter pressure shows the right pressure is elevated  and mean wedge is 26 and mean PA 40 mmHg. 8.  The patient was hypoxic. His stats, PA was 46% and O2 sat was 76%. ASSESSMENT:  1. The patient had RCA disease present with collateral circulation  filling right-to-right and left-to-right. It appeared . We will  treat him medically. 2.  The patient has significant pulmonary hypertension present. 3.  The patient is hypoxic. The patient needs oxygen and I think he has oxygen at home. He has not  been using it. We will reinforce that he needs to use that. I will start him on Lasix 40 mg once a day and continue to use aspirin  and statins for CAD. 1.  We will maximize medical treatment for his CAD. 2.  He has heart failure with reduced ejection fraction present. We  will maximize medical treatment as outpatient. 3.  Pulmonary hypertension present and some possible sleep apnea. The patient tolerated the procedure well. No complications were noted.   Blood loss Ian Lua MD    D: 12/06/2022 8:43:44       T: 12/06/2022 10:16:00     [id number]  Job#: [id number]     Doc#: [id number]    CC:

## 2022-12-06 NOTE — BRIEF OP NOTE
Brief Postoperative Note      Patient: Yamilex Arguello  YOB: 1950  MRN: [id number]    Date of Procedure: 12/6/22    Pre-Op Diagnosis: chest pain and dyspnea    Post-Op Diagnosis: Same         Estimated Blood Loss (mL): Minimal    Complications: None    Findings:   DICTATED--[id number]  LEFT MAIN PATENT  LAD MILD DX  RAMUS MILD DX  LCX MILD DX  RCA % WITH RIGHT TO RIGHT AND LEFT TO RIGHT COLLATERAL   LVEDP 25  RA=26/25/22  RV-57/15/24  PA-55/27/39  PCWP-22/28/26    ONE VESSEL CAD MEDICAL TREATMENT  HFrEF-OPTIMIZE MEDICAL TREATMENT  HYPOXIA NEEDS OXYGEN  PULMONARY HTN-MAY NEED SLEEP STUDY ALSO  ADD LASIX 40MG DAILY   HOME LATER TODAY WITH A CLOSE FOLLOW UP  RIGHT RADIAL AND BRACHIAL APPROACH  NO COMPLICATIONS      Electronically signed by Ju Pearson MD on 12/6/2022 at 8:39 AM

## 2022-12-06 NOTE — CONSULTS
History and Physical      Name:  Gumaro Swanson /Age/Sex: 1950  (71 y.o. male)   MRN & CSN:  [id number] & [id number] Encounter Date/Time: 2022 9:23 AM EST   Location:  Cath Lab/NONE PCP: Deisy Marrero MD       Hospital Day: 1    Assessment and Plan:     Gumaro Swanson is a 71 y.o. male who presents for planned left and right heart cath procedure and was found to be hypoxic postprocedure, unable to wean off of nasal cannula oxygen. Medical decision making:  Acute hypoxic and hypercapnic respiratory failure  No baseline oxygen requirement at home, unable to wean off to 2 L after heart cath. In the setting of untreated GABRIEL and pulmonary hypertension found on heart cath  ABG revealing hypercapnia and hypoxia  Given 40 mg of Lasix post heart cath  Dr. Audrey Perez to manage cardiac medications  Wean off oxygen as able  May need home oxygen evaluation and case management   Heart failure reduced ejection fraction  Last stress test in care everywhere from  with EF 31%  Start Lasix per cardiology  CAD  Status post left heart cath on 2022 with one-vessel CAD, plan for medical management  Obstructive sleep apnea  He was told previously that he needs to be on a CPAP machine, but did not follow-up and currently does not use CPAP  Previous hemorrhagic CVA ()  CVA  Continue daily aspirin  Hyperlipidemia  Continue home statin  Essential hypertension  Patient states he was recently put on coreg.  Dr. Audrey Perez to determine cardiac medications post-cath  History of atrial fibrillation  NSR currently  GERD  Continue PPI  Previous nicotine use      Disposition:   Current Living situation: Home  Expected Disposition: Same  Estimated D/C: TBD    Diet regular   DVT Prophylaxis [x] Lovenox, []  Heparin, [] SCDs, [] Ambulation,  [] Eliquis, [] Xarelto   Code Status full   Surrogate Decision Maker/ POA family     History from:   Patient and EMR and family at bedside  History of Present Illness:   Chief Complaint: Hypoxia  Henry Del Real is a 71 y.o. male with pmh of atrial fibrillation, hemorrhagic CVA and CVA, hypertension, hyperlipidemia, heart failure, GERD, depression who presents for a planned left and right heart cath by Dr. Pritesh Bryan on 12/6/2022. He was found to have one-vessel disease and pulmonary hypertension with plans for medical management and further testing for obstructive sleep apnea. However, post heart cath, he was hypoxic and ABG revealed both hypercapnia and hypoxia. He was placed on 2 L of oxygen which improved his oxygenation. He was admitted for further work-up with CTA PE, pulmonology consult, and weaning off oxygen if able. Patient himself denies chest pain. He states for the past month he has had progressive exertional dyspnea. He also has dyspnea lying flat and usually has to lay on his side. He denies fevers, chills, cough, congestion, abdominal pain, dysuria, hematuria. Previous history of stroke with intermittent dysphagia, however he typically tolerates a regular diet. Patient's past medical, social, and family history have been reviewed. Review of Systems:    10 point system reviewed, negative, unless as noted in above HPI. Objective:   No intake or output data in the 24 hours ending 12/06/22 0923   Vitals:   Vitals:    12/06/22 0628 12/06/22 0855   BP: 118/76 126/74   Pulse: 62 68   Resp: 21 18   Temp: (!) 95 °F (35 °C)    TempSrc: Temporal    SpO2: 98% 92%   Weight: 198 lb (89.8 kg)    Height: 6' (1.829 m)      Medications Prior to Admission     Prior to Admission medications    Medication Sig Start Date End Date Taking?  Authorizing Provider   furosemide (LASIX) 40 MG tablet Take 1 tablet by mouth daily 12/6/22  Yes Jory Cabot, MD   aspirin EC 81 MG EC tablet Take 1 tablet by mouth daily 9/25/20   Balwinder Albrecht MD   rivastigmine (EXELON) 9.5 MG/24HR Place 1 patch onto the skin daily    Historical Provider, MD   tamsulosin (FLOMAX) 0.4 MG capsule Take 0.4 mg by mouth daily    Historical Provider, MD   gabapentin (NEURONTIN) 100 MG capsule Take 1 capsule by mouth 3 times daily for 30 days. Patient taking differently: Take 100 mg by mouth 2 times daily. 1 in the A.M. 2 in the P.M. 3/17/20 12/6/22  KARLA Bolden MD   citalopram (CELEXA) 10 MG tablet Take 1 tablet by mouth 2 times daily 3/17/20   KARLA Bolden MD   atorvastatin (LIPITOR) 40 MG tablet Take 1 tablet by mouth nightly 3/17/20   KARLA Bolden MD   amLODIPine (NORVASC) 10 MG tablet Take 1 tablet by mouth daily 3/18/20   KARLA Bolden MD   traMADol (ULTRAM) 50 MG tablet Take 50 mg by mouth every 6 hours as needed for Pain. Ibrahima Pereira Historical Provider, MD   OXYGEN Inhale 2 L into the lungs nightly  Patient not taking: No sig reported 1/18/19   Shine Mccarthy MD   esomeprazole ([employer]) 40 MG delayed release capsule Take 40 mg by mouth every other day 01/16/19 Patient states he takes this every other day, took today    Historical Provider, MD   multivitamin [facility]) per tablet Take 1 tablet by mouth daily. Historical Provider, MD   acetaminophen (TYLENOL) 325 MG tablet Take 500 mg by mouth 2 times daily as needed     Historical Provider, MD     Physical Exam:      GEN -Awake. NAD. Appears given age. EYES -PERRLA. No scleral erythema, discharge, or conjunctivitis. HENT -MM are moist. Oral pharynx without exudates, no evidence of thrush. NECK -Supple, no apparent thyromegaly or masses. RESP -CTA, no wheezes, rales or rhonchi. Symmetric chest movement while on 2L NC.   C/V -S1/S2 auscultated. RRR without appreciable M/R/G. No JVD or carotid bruits. Peripheral pulses equal bilaterally and palpable. Cap refill <3 sec. No peripheral edema. Right TR band in place over heart cath radial site  GI -Abdomen is soft non distended and without significant tenderness to palpation. + BS. No masses or guarding.  -No CVA/ flank tenderness. Garrett catheter is not present.   LYMPH-No palpable cervical lymphadenopathy and no hepatosplenomegaly. No petechiae or ecchymoses. MS -No gross joint deformities. SKIN -Normal coloration, warm, dry. Matt Dimmer, alert, oriented x  person, place, time, situation. Cranial nerves appear grossly intact, normal speech, no lateralizing weakness. PSYC- Appropriate affect. Past Medical History:   PMHx   Past Medical History:   Diagnosis Date    Arthritis     Former smoker 2019    GERD (gastroesophageal reflux disease)     H/O echocardiogram 2020    EF 50-55%, There is a small (trivial) posterior pericardial effusion. No change since last echo 3/4/20, Mod LVH, Mild MR & TR. History of nuclear stress test 2020    EF 31%, Normal tracer uptake in all segments of myocardium on stress and rest images, Left ventricular global function is moderately reduced suggestive of nonischemic cardiomyopathy, EF 31%    Hx of fall     per old chart pt in ER 2019 after fall - fx 4 ribs and small hemothorax    Hyperlipidemia     Hypertension     Obstructive sleep apnea 2019    Pleural effusion     per old chart pt had CXR done 2019 noted large pleural effusion for thoracentesis 2019    Shortness of breath 2019    Unspecified cerebral artery occlusion with cerebral infarction 1997    no residual effects     PSHX:  has a past surgical history that includes joint replacement; Colonoscopy; and Bunionectomy. Allergies: No Known Allergies  Fam HX: family history includes Clotting Disorder in his mother; Heart Disease in his father and mother; Hypertension in his maternal grandfather.   Soc HX:   Social History     Socioeconomic History    Marital status: Single   Tobacco Use    Smoking status: Former     Packs/day: 0.50     Years: 30.00     Pack years: 15.00     Types: Cigarettes     Quit date: 2010     Years since quittin.8    Smokeless tobacco: Never   Vaping Use    Vaping Use: Never used   Substance and Sexual Activity    Alcohol use: Not Currently     Comment: caffeine 2-3 pop/cofee a day    Drug use: No     Medications:   Medications:    Infusions:   PRN Meds:   Labs    No results found. CBC:   Recent Labs     12/05/22  1159   WBC 4.3   HGB 13.6        BMP:    Recent Labs     12/05/22  1159      K 4.8      CO2 30   BUN 12   CREATININE 0.7*   GLUCOSE 92     Hepatic: No results for input(s): AST, ALT, ALB, BILITOT, ALKPHOS in the last 72 hours. Lipids:   Lab Results   Component Value Date/Time    CHOL 178 07/29/2012 09:00 AM    HDL 59 07/29/2012 09:00 AM    TRIG 73 07/29/2012 09:00 AM     Hemoglobin A1C:   Lab Results   Component Value Date/Time    LABA1C 5.7 07/29/2012 09:00 AM     TSH: No results found for: TSH  Troponin:   Lab Results   Component Value Date/Time    TROPONINT 0.011 03/02/2020 07:20 PM     Lactic Acid: No results for input(s): LACTA in the last 72 hours. BNP: No results for input(s): PROBNP in the last 72 hours. UA:  Lab Results   Component Value Date/Time    NITRU NEGATIVE 05/08/2020 12:35 PM    COLORU YELLOW 05/08/2020 12:35 PM    WBCUA 1 05/08/2020 12:35 PM    RBCUA 1 05/08/2020 12:35 PM    MUCUS RARE 05/08/2020 12:35 PM    TRICHOMONAS NONE SEEN 05/08/2020 12:35 PM    YEAST FEW 02/13/2019 08:15 PM    BACTERIA NEGATIVE 05/08/2020 12:35 PM    CLARITYU CLEAR 05/08/2020 12:35 PM    SPECGRAV 1.011 05/08/2020 12:35 PM    LEUKOCYTESUR NEGATIVE 05/08/2020 12:35 PM    UROBILINOGEN NORMAL 05/08/2020 12:35 PM    BILIRUBINUR NEGATIVE 05/08/2020 12:35 PM    BLOODU SMALL 05/08/2020 12:35 PM    KETUA NEGATIVE 05/08/2020 12:35 PM     Urine Cultures: No results found for: LABURIN  Blood Cultures: No results found for: BC  No results found for: BLOODCULT2  Organism: No results found for: ORG  Imaging/Diagnostics Last 24 Hours   No results found.     Personally reviewed Lab Studies, Imaging, and discussed case with Dr. Familia Ugalde PA-C  Hospitalist  12/6/2022, 9:23 AM

## 2022-12-06 NOTE — PROGRESS NOTES
Cancel discharge  Admit to medicine for hypoxia  Patient agrees for admission  Consult pulmonary and hospitalist  Discussed with family

## 2022-12-06 NOTE — PROGRESS NOTES
4 Eyes Skin Assessment     NAME:  Nichelle Cortez  YOB: 1950  MEDICAL RECORD NUMBER:  [id number]    The patient is being assessed for  Cath Lab Post-Op    I agree that One RN have performed a thorough Head to Toe Skin Assessment on the patient. ALL assessment sites listed below have been assessed. Areas assessed by both nurses:    Head, Face, Ears, Shoulders, Back, Chest, Arms, Elbows, Hands, Sacrum. Buttock, Coccyx, Ischium, and Legs. Feet and Heels        Does the Patient have a Wound?  No noted wound(s)       Prudencio Prevention initiated by RN: Yes   Wound Care Orders initiated by RN: Yes    Pressure Injury (Stage 3,4, Unstageable, DTI, NWPT, and Complex wounds) if present place referral order by RN under : No    New and Established Ostomies, if present place, referral order under : No      Nurse 1 eSignature: Electronically signed by Mata Perez RN on 12/6/22 at 5:43 PM EST    **SHARE this note so that the co-signing nurse is able to place an eSignature**    Nurse 2 eSignature: {Esignature:[id number]}

## 2022-12-07 VITALS
BODY MASS INDEX: 26.82 KG/M2 | RESPIRATION RATE: 16 BRPM | WEIGHT: 198 LBS | SYSTOLIC BLOOD PRESSURE: 119 MMHG | DIASTOLIC BLOOD PRESSURE: 78 MMHG | TEMPERATURE: 97.9 F | HEART RATE: 88 BPM | HEIGHT: 72 IN | OXYGEN SATURATION: 95 %

## 2022-12-07 LAB
ANION GAP SERPL CALCULATED.3IONS-SCNC: 7 MMOL/L (ref 4–16)
BUN BLDV-MCNC: 13 MG/DL (ref 6–23)
CALCIUM SERPL-MCNC: 8.6 MG/DL (ref 8.3–10.6)
CHLORIDE BLD-SCNC: 105 MMOL/L (ref 99–110)
CO2: 29 MMOL/L (ref 21–32)
CREAT SERPL-MCNC: 0.6 MG/DL (ref 0.9–1.3)
GFR SERPL CREATININE-BSD FRML MDRD: >60 ML/MIN/1.73M2
GLUCOSE BLD-MCNC: 105 MG/DL (ref 70–99)
POTASSIUM SERPL-SCNC: 4.4 MMOL/L (ref 3.5–5.1)
SODIUM BLD-SCNC: 141 MMOL/L (ref 135–145)

## 2022-12-07 PROCEDURE — 6370000000 HC RX 637 (ALT 250 FOR IP): Performed by: INTERNAL MEDICINE

## 2022-12-07 PROCEDURE — 80048 BASIC METABOLIC PNL TOTAL CA: CPT

## 2022-12-07 PROCEDURE — 6370000000 HC RX 637 (ALT 250 FOR IP): Performed by: PHYSICIAN ASSISTANT

## 2022-12-07 PROCEDURE — 36415 COLL VENOUS BLD VENIPUNCTURE: CPT

## 2022-12-07 PROCEDURE — 6360000002 HC RX W HCPCS: Performed by: INTERNAL MEDICINE

## 2022-12-07 PROCEDURE — 94761 N-INVAS EAR/PLS OXIMETRY MLT: CPT

## 2022-12-07 PROCEDURE — 2580000003 HC RX 258: Performed by: INTERNAL MEDICINE

## 2022-12-07 PROCEDURE — 94618 PULMONARY STRESS TESTING: CPT

## 2022-12-07 PROCEDURE — 2700000000 HC OXYGEN THERAPY PER DAY

## 2022-12-07 PROCEDURE — 6370000000 HC RX 637 (ALT 250 FOR IP): Performed by: STUDENT IN AN ORGANIZED HEALTH CARE EDUCATION/TRAINING PROGRAM

## 2022-12-07 RX ORDER — CARVEDILOL 12.5 MG/1
12.5 TABLET ORAL 2 TIMES DAILY WITH MEALS
Qty: 60 TABLET | Refills: 3 | Status: SHIPPED | OUTPATIENT
Start: 2022-12-07

## 2022-12-07 RX ORDER — ONDANSETRON 2 MG/ML
4 INJECTION INTRAMUSCULAR; INTRAVENOUS EVERY 6 HOURS PRN
Status: DISCONTINUED | OUTPATIENT
Start: 2022-12-07 | End: 2022-12-07 | Stop reason: HOSPADM

## 2022-12-07 RX ORDER — LOSARTAN POTASSIUM 25 MG/1
25 TABLET ORAL DAILY
Qty: 30 TABLET | Refills: 3 | Status: SHIPPED | OUTPATIENT
Start: 2022-12-08

## 2022-12-07 RX ORDER — LOSARTAN POTASSIUM 25 MG/1
25 TABLET ORAL DAILY
Status: DISCONTINUED | OUTPATIENT
Start: 2022-12-07 | End: 2022-12-07 | Stop reason: HOSPADM

## 2022-12-07 RX ORDER — CARVEDILOL 6.25 MG/1
12.5 TABLET ORAL 2 TIMES DAILY WITH MEALS
Status: DISCONTINUED | OUTPATIENT
Start: 2022-12-07 | End: 2022-12-07 | Stop reason: HOSPADM

## 2022-12-07 RX ORDER — ONDANSETRON 2 MG/ML
4 INJECTION INTRAMUSCULAR; INTRAVENOUS EVERY 6 HOURS PRN
Status: DISCONTINUED | OUTPATIENT
Start: 2022-12-07 | End: 2022-12-07

## 2022-12-07 RX ADMIN — ASPIRIN 81 MG: 81 TABLET, COATED ORAL at 08:20

## 2022-12-07 RX ADMIN — SODIUM CHLORIDE, PRESERVATIVE FREE 10 ML: 5 INJECTION INTRAVENOUS at 08:19

## 2022-12-07 RX ADMIN — LOSARTAN POTASSIUM 25 MG: 25 TABLET, FILM COATED ORAL at 08:20

## 2022-12-07 RX ADMIN — APIXABAN 5 MG: 5 TABLET, FILM COATED ORAL at 12:17

## 2022-12-07 RX ADMIN — GABAPENTIN 100 MG: 100 CAPSULE ORAL at 08:20

## 2022-12-07 RX ADMIN — FUROSEMIDE 40 MG: 20 TABLET ORAL at 08:20

## 2022-12-07 RX ADMIN — CITALOPRAM HYDROBROMIDE 10 MG: 20 TABLET ORAL at 08:20

## 2022-12-07 RX ADMIN — CARVEDILOL 12.5 MG: 6.25 TABLET, FILM COATED ORAL at 08:20

## 2022-12-07 RX ADMIN — TAMSULOSIN HYDROCHLORIDE 0.4 MG: 0.4 CAPSULE ORAL at 08:19

## 2022-12-07 RX ADMIN — TRAMADOL HYDROCHLORIDE 50 MG: 50 TABLET, COATED ORAL at 12:17

## 2022-12-07 RX ADMIN — ONDANSETRON 4 MG: 2 INJECTION INTRAMUSCULAR; INTRAVENOUS at 05:33

## 2022-12-07 ASSESSMENT — PAIN SCALES - GENERAL: PAINLEVEL_OUTOF10: 0

## 2022-12-07 ASSESSMENT — PAIN DESCRIPTION - LOCATION: LOCATION: HEAD

## 2022-12-07 NOTE — CARE COORDINATION
[facility] Liaison spoke with pt POA and is aware of discharge & will initiate [facility]. Will have [facility] call Deanna for appts. No wounds or IVs noted.

## 2022-12-07 NOTE — PROGRESS NOTES
12/7/2022 10:21 AM  Patient Room #: 3572/0870-H  Patient Name: Lubna Monroe    (Step 1 Done by RN if possible otherwise call Pulmonary Diagnostics)  Place patient on room air at rest for at least 30 minutes. If patient falls below 88% before 30 minutes then you can record the level and stop. Record room air saturation level _83_ %. If patient is at 88% or below, they will qualify for home oxygen and you can stop. If level does not fall below 88%, fill in level above. If indicated continue to Step 2. Signature:_Patricia Keller RRT____ Date: __12/07/2022_  (Step 2&3 Done by P)  Ambulate patient on room air until saturation falls below 89%. Record level of room air saturation with ambulation___ %. Next, place patient back on ___lpm oxygen and ambulate, record level __%. (Note:  this level must show improvement from room air level done with ambulation.)  If patients saturation on room air with ambulation is 88% or below AND patient shows improvement with oxygen during ambulation, they will qualify for home oxygen and you can stop. If patient does not drop below 89%, then patient should have an overnight oximetry trending on room air to see if level falls below 88%. Complete level in Step 3 below. Room air overnight oximetry level 88 % for___  cumulative minutes. If patients room air oxygen level is < 89% for at least 5 cumulative minutes, patient will qualify for home oxygen and you can stop. (Attach Night Trending Report)    Complete order below: Diagnosis:__CAD__  Home oxygen at:  Length of Need: ? Lifetime ?  3 Months     _3__lpm or __%   via  [x] nasal cannula  []mask  [] other         [x]continuous [x]  with activity  [x]  Nocturnal   [x] Portable Tanks [x]  Concentrator  [] Conserving Device        Therapist Signature:__Patricia Keller, RRT ___     Date:  _12/07/2022__  Physician Signature:  __Electronically Signed in EMR_    Date:___  Ordering User: Marcell Esquivel

## 2022-12-07 NOTE — PLAN OF CARE
Problem: Chronic Conditions and Co-morbidities  Goal: Patient's chronic conditions and co-morbidity symptoms are monitored and maintained or improved  12/7/2022 0507 by Luis Alberto Mcdonald RN  Outcome: Progressing  12/6/2022 1743 by Nichelle Velarde RN  Outcome: Progressing  12/6/2022 1613 by Nichelle Velarde RN  Outcome: Progressing     Problem: Discharge Planning  Goal: Discharge to home or other facility with appropriate resources  12/7/2022 0507 by Luis Alberto Mcdonald RN  Outcome: Progressing  12/6/2022 1743 by Nichelle Velarde RN  Outcome: Progressing  12/6/2022 1613 by Nichelle Velarde RN  Outcome: Progressing     Problem: Safety - Adult  Goal: Free from fall injury  12/7/2022 0507 by Luis Alberto Mcdonald RN  Outcome: Progressing  12/6/2022 1743 by Nichelle Velarde RN  Outcome: Progressing  12/6/2022 1613 by Nichelle Velarde RN  Outcome: Progressing     Problem: Pain  Goal: Verbalizes/displays adequate comfort level or baseline comfort level  12/7/2022 0507 by Luis Alberto Mcdonald RN  Outcome: Progressing  12/6/2022 1743 by Nichelle Velarde RN  Outcome: Progressing  12/6/2022 1613 by Nichelle Velarde RN  Outcome: Progressing

## 2022-12-07 NOTE — PROGRESS NOTES
Patient c/o just \"not feeling good\". SBP noted to be elevated, and states nauseated this am. Cath sites in right brachial and right radial are good. Zofran ordered.

## 2022-12-07 NOTE — PROGRESS NOTES
Daily Progress Note  Subjective:    Pt. Awake, alert and feeling ok  Hr stable, AF in the 80's, BP stable  No CP, SOB stable per pt. This am, he has NC on    Attending Note:    Patient is awake alert   Feeling better   Pulmonary HTN and hypoxia  Need home oxygen on d/c  Keep on diuretics  HTN started on coreg and ARB  Ok to d/c home  Hospitalist to do the discharge summary and discharge orders  Hx of CAD-RCA occluded medical treatment  Chronic afib on coreg -watch heart rate -resume Eliquis   F/u in office in one  week  HFrEF  EF 22% max medical treatment   Max as outpatients med slowly as BP tolerates     Impression and Plan:     Acute on Chronic HFrEF    EF 25% on recent stress test OP    Appeared to show mild-mod reversible per fusion defect so LHC was ordered    100% occlusion of RCA with collaterals noted-appears more  so no intervention done    Severe PAH noted- apparently he has been Dx with GABRIEL previously but he never wore the CPAP    Pulm to see this am for PAH and GABRIEL- may need Home O2 as well    OMT for HF-on coreg and losartan now, will add aldactone and SGLT2 OP as able    Permanent AF    On coreg and AC    Rate controlled currently    Will optimize BB OP    Increase activity-stable from cardiac standpoint  If ok with pulm and primary can D/C later today   Hospitalist to D/C pt. Today  F/u at office in one week    Most Recent Heart Cath  12/6/22  IMPRESSION:  1. Left main is patent. 2.  LAD has mild disease present. 3.  Ramus has mild disease noted. 4.  Circ has mild disease noted. 5.  Right coronary artery is proximally 100% occluded with  right-to-right and left-to-right collateral circulation present. 6.  EDP is around 25 mmHg present. 7.  Right heart catheter pressure shows the right pressure is elevated  and mean wedge is 26 and mean PA 40 mmHg. 8.  The patient was hypoxic. His stats, PA was 46% and O2 sat was 76%. PAST MEDICAL HISTORY:  History of having stroke present.   He is having  hypertension and atrial fibrillation. PAST SURGICAL HISTORY:  Left knee surgery, right bunionectomy, and right  total knee replacement. SOCIAL HISTORY:  He does not smoke. He does not drink. MEDICATIONS AT HOME:  He is on aspirin 81 mg once a day, Coreg 6.25 mg  b.i.d., Celexa, Eliquis 5 mg b.i.d., Lasix 20 mg once and day,  Neurontin, and Lipitor. Objective:   /73   Pulse 83   Temp 97.8 °F (36.6 °C) (Oral)   Resp 17   Ht 6' (1.829 m)   Wt 198 lb (89.8 kg)   SpO2 95%   BMI 26.85 kg/m²     Intake/Output Summary (Last 24 hours) at 12/7/2022 9465  Last data filed at 12/6/2022 2201  Gross per 24 hour   Intake 215 ml   Output --   Net 215 ml       Medications:   Scheduled Meds:   losartan  25 mg Oral Daily    carvedilol  12.5 mg Oral BID WC    apixaban  5 mg Oral BID    sodium chloride flush  5-40 mL IntraVENous 2 times per day    atorvastatin  40 mg Oral Nightly    citalopram  10 mg Oral BID    tamsulosin  0.4 mg Oral Daily    gabapentin  100 mg Oral BID    aspirin EC  81 mg Oral Daily    furosemide  40 mg Oral Daily    pantoprazole  40 mg Oral Every Other Day      Infusions:   sodium chloride        PRN Meds:  ondansetron, sodium chloride flush, sodium chloride, acetaminophen, atropine, traMADol     Physical Exam:  Vitals:    12/07/22 0818   BP: 109/73   Pulse: 83   Resp: 17   Temp: 97.8 °F (36.6 °C)   SpO2: 95%        General: AAO, NAD  Chest: Nontender  Cardiac: First and Second Heart Sounds are Normal, No Murmurs or Gallops noted  Lungs:Clear to auscultation and percussion. Abdomen: Soft, NT, ND, +BS  Extremities: No clubbing, no edema  Vascular:  Equal 2+ peripheral pulses.     Lab Data:  CBC:   Recent Labs     12/05/22  1159   WBC 4.3   HGB 13.6   HCT 44.5   MCV 98.2        BMP:   Recent Labs     12/05/22  1159 12/07/22  0714    141   K 4.8 4.4    105   CO2 30 29   BUN 12 13   CREATININE 0.7* 0.6*     LIVER PROFILE:   Recent Labs     12/06/22  0918   AST 26 ALT 16   BILIDIR 0.2   BILITOT 0.7   ALKPHOS 113     PT/INR:   Recent Labs     12/05/22  1159   PROTIME 19.6*   INR 1.51     APTT:   Recent Labs     12/05/22  1159   APTT 38.9*     BNP:  No results for input(s): BNP in the last 72 hours.       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

## 2022-12-07 NOTE — CONSULTS
[address] [place] [address], [address] [place] [address]                                  CONSULTATION    PATIENT NAME: Dennie Law                      :        1950  MED REC NO:   [id number]                          ROOM:       7339  ACCOUNT NO:   [de-identified]                           ADMIT DATE: 2022  PROVIDER:     Mohsen Armenta MD    CONSULT DATE:  2022    HISTORY OF PRESENT ILLNESS:  The patient is a 78-year-old gentleman with  multiple medical problems including obstructive sleep apnea not on CPAP,  hypertension, hyperlipidemia, gastroesophageal reflux disease, history  of smoking in the past and possible underlying COPD, history of stroke,  who was admitted through the emergency room with complaints of  increasing shortness of breath. He denied any significant cough. He  denies any fever or chills. He denied any nausea or vomiting. He  denied any chest pains. He was found to be in atrial fibrillation. He  has an ejection fraction of 25%. The patient underwent cardiac  catheterization and was found to have reduced ejection fraction and had  an elevated wedge pressure of 26. The patient was started on diuretic  therapy. The patient does have a history of sleep apnea but is not on  CPAP. PAST MEDICAL HISTORY:  Significant for congestive heart failure,  hypertension, hyperlipidemia, obstructive sleep apnea not on CPAP,  pleural effusion, arthritis. PAST SURGICAL HISTORY:  Remarkable for bunionectomy, colonoscopy, joint  replacement surgery. FAMILY HISTORY:  Reveals that his mother had heart disease. Father had  heart disease. SOCIAL HISTORY:  Reveals that he quit smoking in  but used to smoke  half pack per day for 30 years prior to that. No history of alcohol or  drug abuse. MEDICATIONS:  Were reviewed. ALLERGIES:  He has no known allergies.     REVIEW OF SYSTEMS:  A 10- to 14-point review of systems were reviewed  and are negative except for what is mentioned in the history of present  illness. PHYSICAL EXAMINATION:  GENERAL:  The patient is alert, oriented x3, in no acute respiratory  distress. VITAL SIGNS:  His blood pressure is 109/73 mmHg, pulse of 83 per minute,  and respiratory rate of 17 per minute. He is afebrile. His saturation  is 95% on 2 liters nasal cannula. HEENT:  Exam reveals mild JVD. No lymphadenopathy. NECK:  Supple. LUNGS:  Exam revealed diminished breath sounds at the bases and  occasional basilar crackles. HEART:  Exam showed normal S1, S2.  ABDOMEN:  Benign. There is no evidence of any organomegaly. The bowel  sounds are present. NEUROLOGIC:  Exam reveals that he is awake and responsive, answering  questions appropriately, and moving his extremities. LABORATORY AND DIAGNOSTIC DATA:  His CAT scan of the chest showed no  evidence of PE, marked cardiomegaly, mild bilateral pleural effusion and  basilar atelectasis and some pericardial effusion and mild abdominal  ascites. His electrolytes were unremarkable. His CBC showed a white  count of 4.3, hemoglobin 13.6, and hematocrit 44.5. IMPRESSION:  1. Decompensated congestive heart failure. 2.  Possible underlying COPD. 3.  Obstructive sleep apnea not on CPAP. PLAN:  1. Gentle diuresis. 2.  Home oxygen evaluation. 3.  The patient will need outpatient sleep study and subsequently to  arrange the CPAP machine. Discussed with the patient in detail about  the importance of followup in the office. 4.  Check mag and phos. 5.  The patient has been started on diuretic therapy. Followup chest  x-ray in three to four weeks' time. 6.  Follow up in the office after the home oxygen is arranged in 10-14  days. 7.  Discussed with the patient in detail. 8.  We will start the patient on Stiolto. 9. PFTs as outpatient. 10.  As per orders.         Micheline Weiner MD    D: 12/07/2022 9:50:19       T: 12/07/2022 9:53:47     /[email or web address]  Job#: [id number]     Doc#: [id number]    CC:

## 2022-12-07 NOTE — PROGRESS NOTES
Patient was seen in hospital for    CAD  . I am prescribing oxygen because the diagnosis and testing requires the patient to have oxygen in the home. Conditions will improve or be benefited by oxygen use. The patient is able to perform good mobility and therefore requires the use of a portable oxygen system for ambulation.

## 2022-12-07 NOTE — DISCHARGE SUMMARY
V2.0  Discharge Summary    Name:  Alex Heller /Age/Sex: 1950 (71 y.o. male)   Admit Date: 2022  Discharge Date: 22    MRN & CSN:  [id number] & [id number] Encounter Date and Time 22 10:25 AM EST    Attending:  Xiao Chi MD Discharging Provider: Rubina Kern MD       Hospital Course:     Brief HPI:  Mr. Elisabet Greco is a 80-year-old male with PMH A. fib, HFrEF, HTN, HLD who presents for [facility] however was unable to be weaned off oxygen following the procedure. Does endorse a cough productive of yellow sputum for the past 6 months, had a couple round of antibiotics. Also reports progressive RIOS and orthopnea over the past few months. No other complaints at this time    Brief Problem Based Course:   Acute hypoxic  respiratory failure  No baseline oxygen requirement at home, unable to wean off to 2-3 L after heart cath. In the setting of untreated GABRIEL and pulmonary hypertension found on heart cath  Given 40 mg of Lasix post heart cath  Home oxygen eval ordered, will send patient with 3 days oxygen via nasal cannula upon discharge and follow-up with pulmonology as outpatient. Acute on Chronic HFrEF   EF 25% on recent stress test OP  Appeared to show mild-mod reversible per fusion defect so LHC was ordered 100% occlusion of RCA with collaterals noted-appears more  so no intervention done, Severe PAH noted- apparently he has been Dx with GABRIEL previously but he never wore the CPAPPulm to see this am for PAH and GABRIEL- may need Home O2 as well GDMT for HF-on coreg and losartan now, Plan to add aldactone and SGLT 2 inhibitors as Outpatient    Obstructive sleep apnea  He was told previously that he needs to be on a CPAP machine, but did not follow-up and currently does not use CPAP, Outpatient pulm follow up advised and getting a sleep study if CPAP needed    The patient expressed appropriate understanding of, and agreement with the discharge recommendations, medications, and plan.      Consults this admission:  IP CONSULT TO PULMONOLOGY  IP CONSULT TO HOSPITALIST  IP CONSULT TO CASE MANAGEMENT    Discharge Diagnosis:   Shortness of breath      Discharge Instruction:   Follow up appointments:    Primary care physician: Bryce Wall MD within 2 weeks  Diet: cardiac diet   Activity: activity as tolerated  Disposition: Discharged to:   []Home, [facility], []SNF, []Acute Rehab, []Hospice   Condition on discharge: Stable  Labs and Tests to be Followed up as an outpatient by PCP or Specialist:     Discharge Medications:        Medication List        START taking these medications      apixaban 5 MG Tabs tablet  Commonly known as: ELIQUIS  Take 1 tablet by mouth 2 times daily     carvedilol 12.5 MG tablet  Commonly known as: COREG  Take 1 tablet by mouth 2 times daily (with meals)     furosemide 40 MG tablet  Commonly known as: LASIX  Take 1 tablet by mouth daily     losartan 25 MG tablet  Commonly known as: COZAAR  Take 1 tablet by mouth daily  Start taking on: December 8, 2022     tiotropium-olodaterol 2.5-2.5 MCG/ACT Aers  Commonly known as: STIOLTO  Inhale 2 puffs into the lungs daily            CONTINUE taking these medications      acetaminophen 325 MG tablet  Commonly known as: TYLENOL     aspirin EC 81 MG EC tablet  Take 1 tablet by mouth daily     atorvastatin 40 MG tablet  Commonly known as: LIPITOR  Take 1 tablet by mouth nightly     citalopram 10 MG tablet  Commonly known as: CELEXA  Take 1 tablet by mouth 2 times daily     esomeprazole 40 MG delayed release capsule  Commonly known as: NEXIUM     multivitamin per tablet     rivastigmine 9.5 MG/24HR  Commonly known as: EXELON     tamsulosin 0.4 MG capsule  Commonly known as: FLOMAX     traMADol 50 MG tablet  Commonly known as: ULTRAM            STOP taking these medications      amLODIPine 10 MG tablet  Commonly known as: NORVASC            ASK your doctor about these medications      gabapentin 100 MG capsule  Commonly known as: NEURONTIN  Take 1 capsule by mouth 3 times daily for 30 days. OXYGEN  Inhale 2 L into the lungs nightly               Where to Get Your Medications        These medications were sent to [address] [place] [address], [address] [place] [address] 25, [address] [facility] [address]      Phone: [phone]   apixaban 5 MG Tabs tablet  carvedilol 12.5 MG tablet  furosemide 40 MG tablet  losartan 25 MG tablet  tiotropium-olodaterol 2.5-2.5 MCG/ACT Aers        Objective Findings at Discharge:   /73   Pulse 83   Temp 97.8 °F (36.6 °C) (Oral)   Resp 17   Ht 6' (1.829 m)   Wt 198 lb (89.8 kg)   SpO2 95%   BMI 26.85 kg/m²       Physical Exam:   Physical Exam      General: No acute distress, alert and awake  Chest: Nontender  Cardiac: Irregularly irregular S1-S2, rate controlled, no murmur rubs or gallop. Lungs:Clear to auscultation and percussion. Abdomen: Soft, NT, ND, +BS  Extremities: No clubbing, no edema  Vascular:  Equal 2+ peripheral pulses  Labs and Imaging   CT CHEST PULMONARY EMBOLISM W CONTRAST    Result Date: 12/6/2022  EXAMINATION: CTA OF THE CHEST 12/6/2022 5:29 pm TECHNIQUE: CTA of the chest was performed after the administration of intravenous contrast.  Multiplanar reformatted images are provided for review. MIP images are provided for review. Automated exposure control, iterative reconstruction, and/or weight based adjustment of the mA/kV was utilized to reduce the radiation dose to as low as reasonably achievable. COMPARISON: February 13, 2019 HISTORY: ORDERING SYSTEM PROVIDED HISTORY: hypoxia TECHNOLOGIST PROVIDED HISTORY: Reason for exam:->hypoxia Reason for Exam: hypoxia Additional signs and symptoms: 75 mL Isovue 370 FINDINGS: Pulmonary Arteries: Pulmonary arteries are adequately opacified for evaluation. No evidence of intraluminal filling defect to suggest pulmonary embolism. Main pulmonary artery is normal in caliber.  Mediastinum: The heart is markedly enlarged. There is a pericardial effusion, which measures up to 2 cm in thickness. The thoracic aorta is normal in caliber. Moderate atherosclerotic vascular calcifications are present. No intrathoracic lymphadenopathy is present by CT size criteria. Lungs/pleura: Mild to moderate bilateral pleural effusions are present. There is no evidence of pneumothorax. Atelectatic changes are noted at the bilateral lung bases. There are some increased interstitial opacities with a lower lobe predominance. Mild background emphysema. The central airways are patent. Upper Abdomen: Mild ascites noted within the upper abdomen. Soft Tissues/Bones: No acute osseous abnormality. 1. No evidence of acute pulmonary embolism. 2. Marked cardiomegaly. There is a pericardial effusion which measures up to 2 cm in thickness. 3. Mild to moderate bilateral pleural effusions with associated relaxation atelectasis. Mild interstitial pulmonary edema, lower lobe predominant. 4. Mild upper abdominal ascites. CBC:   Recent Labs     12/05/22  1159   WBC 4.3   HGB 13.6        BMP:    Recent Labs     12/05/22  1159 12/07/22  0714    141   K 4.8 4.4    105   CO2 30 29   BUN 12 13   CREATININE 0.7* 0.6*   GLUCOSE 92 105*     Hepatic:   Recent Labs     12/06/22  0918   AST 26   ALT 16   BILITOT 0.7   ALKPHOS 113     Lipids:   Lab Results   Component Value Date/Time    CHOL 178 07/29/2012 09:00 AM    HDL 59 07/29/2012 09:00 AM    TRIG 73 07/29/2012 09:00 AM     Hemoglobin A1C:   Lab Results   Component Value Date/Time    LABA1C 5.7 07/29/2012 09:00 AM     TSH: No results found for: TSH  Troponin:   Lab Results   Component Value Date/Time    TROPONINT 0.011 03/02/2020 07:20 PM     Lactic Acid: No results for input(s): LACTA in the last 72 hours. BNP: No results for input(s): PROBNP in the last 72 hours.   UA:  Lab Results   Component Value Date/Time    NITRU NEGATIVE 05/08/2020 12:35 PM    COLORU YELLOW 05/08/2020 12:35 PM    WBCUA 1 05/08/2020 12:35 PM    RBCUA 1 05/08/2020 12:35 PM    MUCUS RARE 05/08/2020 12:35 PM    TRICHOMONAS NONE SEEN 05/08/2020 12:35 PM    YEAST FEW 02/13/2019 08:15 PM    BACTERIA NEGATIVE 05/08/2020 12:35 PM    CLARITYU CLEAR 05/08/2020 12:35 PM    SPECGRAV 1.011 05/08/2020 12:35 PM    LEUKOCYTESUR NEGATIVE 05/08/2020 12:35 PM    UROBILINOGEN NORMAL 05/08/2020 12:35 PM    BILIRUBINUR NEGATIVE 05/08/2020 12:35 PM    BLOODU SMALL 05/08/2020 12:35 PM    KETUA NEGATIVE 05/08/2020 12:35 PM     Urine Cultures: No results found for: LABURIN  Blood Cultures: No results found for: BC  No results found for: BLOODCULT2  Organism: No results found for: ORG    Time Spent Discharging patient 35 minutes    Electronically signed by Sherry Card MD on 12/7/2022 at 10:25 AM

## 2022-12-07 NOTE — PROGRESS NOTES
12/7/2022 9:58 AM  Patient Room #: 7885/6777-B  Patient Name: Cameron Zavala    (Step 1 Done by RN if possible otherwise call Pulmonary Diagnostics)  Place patient on room air at rest for at least 30 minutes. If patient falls below 88% before 30 minutes then you can record the level and stop. Record room air saturation level _83_ %. If patient is at 88% or below, they will qualify for home oxygen and you can stop. If level does not fall below 88%, fill in level above. If indicated continue to Step 2. Signature:_Patricia Mckee RRT____ Date: __12/07/2022_  (Step 2&3 Done by RCP)  Ambulate patient on room air until saturation falls below 89%. Record level of room air saturation with ambulation___ %. Next, place patient back on ___lpm oxygen and ambulate, record level __%. (Note:  this level must show improvement from room air level done with ambulation.)  If patients saturation on room air with ambulation is 88% or below AND patient shows improvement with oxygen during ambulation, they will qualify for home oxygen and you can stop. If patient does not drop below 89%, then patient should have an overnight oximetry trending on room air to see if level falls below 88%. Complete level in Step 3 below. Room air overnight oximetry level 88 % for___  cumulative minutes. If patients room air oxygen level is < 89% for at least 5 cumulative minutes, patient will qualify for home oxygen and you can stop. (Attach Night Trending Report)    Complete order below: Diagnosis:__CAD__  Home oxygen at:  Length of Need: ? Lifetime ?  3 Months     _3__lpm or __%   via  [x] nasal cannula  []mask  [] other         [x]continuous [x]  with activity  [x]  Nocturnal   [x] Portable Tanks [x]  Concentrator  [] Conserving Device        Therapist Signature:__Patricia Mckee RRT ___     Date:  _12/07/2022__  Physician Signature:  __Electronically Signed in EMR_    Date:___  Ordering User: Noa Schroeder MD  Provider ID: [id number]  NPI:  [id number]    [x] Patient Qualifies      [] Patient Does NOT qualify

## 2022-12-07 NOTE — CARE COORDINATION
Noted that pt has  legal guardian/Blanquita Cole/Daughter. .CM called April for d/c planning. Introduced self and explained reason for call. Daughter states that she did not know that pt was in the hospital.  She states that she is his guardian d/t pt has dementia. She states that  pt lives with a 24/7 caregiver/family friend/Deanna NIKO Jimenez. Caregiver provides assistance with ADL's and provides his transportation. Pt has a PCP, has insurance, and is able to afford his medication. Daughter states that pt has a cane. CM informed her that someone has requested [facility] for pt. She states that it was most likely the caregiver. April is agreeable to [facility] and has requested [facility]. She denies any other d/c needs at this time. D/c plan is home with 24/7 caregiver and [facility]. Referral made to [facility] Sofi harris/Marilyn via PS and informed her that pt has a d/c order. Notify CM if any new d/c needs arise.  CRISTINA

## 2022-12-07 NOTE — PROGRESS NOTES
Pt qualified for home oxygen. Paperwork faxed to [employer]. Please do not discharge pt without oxygen. This testing will  and have to be repeated if pt has not discharged 48 hours from time testing was ordered. Please call [employer] @ [phone] if oxygen has not been delivered prior to pt discharging. Thanks.

## 2023-01-13 ENCOUNTER — HOSPITAL ENCOUNTER (OUTPATIENT)
Age: 73
Discharge: HOME | End: 2023-01-13
Payer: MEDICARE

## 2023-01-13 LAB
ANION GAP SERPL CALCULATED.3IONS-SCNC: 10 MMOL/L (ref 4–16)
BUN BLDV-MCNC: 20 MG/DL (ref 6–23)
CALCIUM SERPL-MCNC: 9.2 MG/DL (ref 8.3–10.6)
CHLORIDE BLD-SCNC: 103 MMOL/L (ref 99–110)
CO2: 28 MMOL/L (ref 21–32)
CREAT SERPL-MCNC: 0.7 MG/DL (ref 0.9–1.3)
GFR SERPL CREATININE-BSD FRML MDRD: >60 ML/MIN/1.73M2
GLUCOSE BLD-MCNC: 168 MG/DL (ref 70–99)
POTASSIUM SERPL-SCNC: 4.3 MMOL/L (ref 3.5–5.1)
SODIUM BLD-SCNC: 141 MMOL/L (ref 135–145)

## 2023-01-13 PROCEDURE — 36415 COLL VENOUS BLD VENIPUNCTURE: CPT

## 2023-01-13 PROCEDURE — 80048 BASIC METABOLIC PNL TOTAL CA: CPT

## 2023-01-17 NOTE — FLOWSHEET NOTE
Outpatient Physical Therapy  [facility]           [x] Phone: [phone]   Fax: [phone]  [facility]           [] Phone: [phone]   Fax: [phone]        Physical Therapy Daily Treatment Note  Date:  3/21/2020    Patient Name:  Sharon Lane    :  1950  MRN: [id number]  Restrictions/Precautions:  Other position/activity restrictions: not driving  Diagnosis:   Diagnosis: CVA  Date of Injury/Surgery: na  Treatment Diagnosis: Treatment Diagnosis: L field neglect/impaired mobility    Insurance/Certification information: PT Insurance Information: Medicare Advantage   Referring Physician:  Referring Practitioner: Holli Huntley  Next Doctor Visit:    Plan of care signed (Y/N):    Outcome Measure: 6  ft  / SL balance 2 sec EO  Visit# / total visits:  1 /  Pain level: 0/10   Goals:       Short term goals  Time Frame for Short term goals: defer to LTG  Long term goals  Time Frame for Long term goals : 12 weeks  20  Long term goal 1: patient's goal- maximize function with ADL/ learn techniques to deal/compensate with L field neglect  Long term goal 2: 15 sec SL balance R/L leg EO  Long term goal 3: 1000 ft  6 MWT    Summary of Evaluation:   Patient primary complaints: effects of CVA L field neglect/impaired mobility   History of condition:CVA : R CVA 3/2/20- discharged from ARU 3/17/20- primary issue is visual with L field neglect; ADL impacted A  Current functional limitations:  ADL impacted by L field neglect- patient has been instructed in compensatory techniques   Clinical findings:6  ft; I with sit to stands; impaired Sl balance b legs  PLOF: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

## 2023-02-19 ENCOUNTER — HOSPITAL ENCOUNTER (OUTPATIENT)
Dept: SLEEP CENTER | Age: 73
Discharge: HOME | End: 2023-02-19
Payer: MEDICARE

## 2023-02-19 DIAGNOSIS — R06.83 SNORING: ICD-10-CM

## 2023-02-19 DIAGNOSIS — G47.10 HYPERSOMNOLENCE: ICD-10-CM

## 2023-02-19 PROCEDURE — 95810 POLYSOM 6/> YRS 4/> PARAM: CPT

## 2023-02-20 NOTE — PROGRESS NOTES
2/19/2023  sleep study  for Pamela Ashton  1950 is complete. Results are pending physician review.     Electronically signed by Judith Aviles on 2/19/2023 at 8:56 PM

## 2023-07-18 ENCOUNTER — HOSPITAL ENCOUNTER (OUTPATIENT)
Age: 73
Discharge: HOME | End: 2023-07-18
Payer: MEDICARE

## 2023-07-18 ENCOUNTER — HOSPITAL ENCOUNTER (OUTPATIENT)
Dept: GENERAL RADIOLOGY | Age: 73
Discharge: HOME | End: 2023-07-18
Payer: MEDICARE

## 2023-07-18 DIAGNOSIS — J43.9 PULMONARY EMPHYSEMA, UNSPECIFIED EMPHYSEMA TYPE (HCC): ICD-10-CM

## 2023-07-18 DIAGNOSIS — J96.11 CHRONIC RESPIRATORY FAILURE WITH HYPOXIA (HCC): ICD-10-CM

## 2023-07-18 LAB
ANION GAP SERPL CALCULATED.3IONS-SCNC: 9 MMOL/L (ref 4–16)
BUN SERPL-MCNC: 11 MG/DL (ref 6–23)
CALCIUM SERPL-MCNC: 8.9 MG/DL (ref 8.3–10.6)
CHLORIDE BLD-SCNC: 108 MMOL/L (ref 99–110)
CO2: 28 MMOL/L (ref 21–32)
CREAT SERPL-MCNC: 0.8 MG/DL (ref 0.9–1.3)
GFR SERPL CREATININE-BSD FRML MDRD: >60 ML/MIN/1.73M2
GLUCOSE SERPL-MCNC: 107 MG/DL (ref 70–99)
POTASSIUM SERPL-SCNC: 4.4 MMOL/L (ref 3.5–5.1)
SODIUM BLD-SCNC: 145 MMOL/L (ref 135–145)

## 2023-07-18 PROCEDURE — 80048 BASIC METABOLIC PNL TOTAL CA: CPT

## 2023-07-18 PROCEDURE — 36415 COLL VENOUS BLD VENIPUNCTURE: CPT

## 2023-07-18 PROCEDURE — 71046 X-RAY EXAM CHEST 2 VIEWS: CPT

## 2023-07-19 ENCOUNTER — HOSPITAL ENCOUNTER (OUTPATIENT)
Age: 73
Discharge: HOME | End: 2023-07-19
Payer: MEDICARE

## 2023-07-19 DIAGNOSIS — R06.00 DYSPNEA, UNSPECIFIED TYPE: ICD-10-CM

## 2023-07-19 LAB — PRO-BNP: 5141 PG/ML

## 2023-07-19 PROCEDURE — 83880 ASSAY OF NATRIURETIC PEPTIDE: CPT

## 2023-07-19 PROCEDURE — 36415 COLL VENOUS BLD VENIPUNCTURE: CPT

## 2023-10-09 NOTE — PROGRESS NOTES
12/10/2020  sleep study  for Keagan Harvey  1950 is complete. Results are pending physician review.     Electronically signed by Pau Bryan on 12/10/2020 at 7:01 AM 6 mo f/u scheduled with pt. She will call [facility] to r/s her EMG.

## 2024-02-01 ENCOUNTER — HOSPITAL ENCOUNTER (OUTPATIENT)
Dept: GENERAL RADIOLOGY | Age: 74
Discharge: HOME | End: 2024-02-01
Payer: MEDICARE

## 2024-02-01 ENCOUNTER — HOSPITAL ENCOUNTER (OUTPATIENT)
Age: 74
Discharge: HOME | End: 2024-02-01
Payer: MEDICARE

## 2024-02-01 DIAGNOSIS — J96.11 CHRONIC RESPIRATORY FAILURE WITH HYPOXIA (HCC): ICD-10-CM

## 2024-02-01 DIAGNOSIS — J43.9 PULMONARY EMPHYSEMA, UNSPECIFIED EMPHYSEMA TYPE (HCC): ICD-10-CM

## 2024-02-01 PROCEDURE — 71046 X-RAY EXAM CHEST 2 VIEWS: CPT

## 2024-03-10 ENCOUNTER — HOSPITAL ENCOUNTER (EMERGENCY)
Age: 74
Discharge: ANOTHER HOSPITAL | End: 2024-03-10
Attending: EMERGENCY MEDICINE
Payer: MEDICARE

## 2024-03-10 VITALS
OXYGEN SATURATION: 100 % | HEIGHT: 72 IN | BODY MASS INDEX: 28.71 KG/M2 | SYSTOLIC BLOOD PRESSURE: 136 MMHG | RESPIRATION RATE: 20 BRPM | WEIGHT: 212 LBS | TEMPERATURE: 98 F | HEART RATE: 63 BPM | DIASTOLIC BLOOD PRESSURE: 78 MMHG

## 2024-03-10 DIAGNOSIS — R04.0 EPISTAXIS: Primary | ICD-10-CM

## 2024-03-10 DIAGNOSIS — J96.11 CHRONIC RESPIRATORY FAILURE WITH HYPOXIA (HCC): ICD-10-CM

## 2024-03-10 LAB
ALBUMIN SERPL-MCNC: 3.9 GM/DL (ref 3.4–5)
ALP BLD-CCNC: 109 IU/L (ref 40–129)
ALT SERPL-CCNC: 6 U/L (ref 10–40)
ANION GAP SERPL CALCULATED.3IONS-SCNC: 10 MMOL/L (ref 7–16)
AST SERPL-CCNC: 14 IU/L (ref 15–37)
BASOPHILS ABSOLUTE: 0 K/CU MM
BASOPHILS RELATIVE PERCENT: 0.2 % (ref 0–1)
BILIRUB SERPL-MCNC: 0.7 MG/DL (ref 0–1)
BUN SERPL-MCNC: 27 MG/DL (ref 6–23)
CALCIUM SERPL-MCNC: 9 MG/DL (ref 8.3–10.6)
CHLORIDE BLD-SCNC: 105 MMOL/L (ref 99–110)
CO2: 31 MMOL/L (ref 21–32)
CREAT SERPL-MCNC: 1.1 MG/DL (ref 0.9–1.3)
DIFFERENTIAL TYPE: ABNORMAL
EOSINOPHILS ABSOLUTE: 0.1 K/CU MM
EOSINOPHILS RELATIVE PERCENT: 2.1 % (ref 0–3)
GFR SERPL CREATININE-BSD FRML MDRD: >60 ML/MIN/1.73M2
GLUCOSE SERPL-MCNC: 110 MG/DL (ref 70–99)
HCT VFR BLD CALC: 34.5 % (ref 42–52)
HEMOGLOBIN: 10.4 GM/DL (ref 13.5–18)
IMMATURE NEUTROPHIL %: 0.2 % (ref 0–0.43)
LYMPHOCYTES ABSOLUTE: 1.1 K/CU MM
LYMPHOCYTES RELATIVE PERCENT: 22 % (ref 24–44)
MCH RBC QN AUTO: 31 PG (ref 27–31)
MCHC RBC AUTO-ENTMCNC: 30.1 % (ref 32–36)
MCV RBC AUTO: 102.7 FL (ref 78–100)
MONOCYTES ABSOLUTE: 0.6 K/CU MM
MONOCYTES RELATIVE PERCENT: 12.8 % (ref 0–4)
NUCLEATED RBC %: 0 %
PDW BLD-RTO: 14.4 % (ref 11.7–14.9)
PLATELET # BLD: 124 K/CU MM (ref 140–440)
PMV BLD AUTO: 9.9 FL (ref 7.5–11.1)
POTASSIUM SERPL-SCNC: 4.4 MMOL/L (ref 3.5–5.1)
RBC # BLD: 3.36 M/CU MM (ref 4.6–6.2)
SEGMENTED NEUTROPHILS ABSOLUTE COUNT: 3.1 K/CU MM
SEGMENTED NEUTROPHILS RELATIVE PERCENT: 62.7 % (ref 36–66)
SODIUM BLD-SCNC: 146 MMOL/L (ref 135–145)
TOTAL IMMATURE NEUTOROPHIL: 0.01 K/CU MM
TOTAL NUCLEATED RBC: 0 K/CU MM
TOTAL PROTEIN: 7.2 GM/DL (ref 6.4–8.2)
WBC # BLD: 4.9 K/CU MM (ref 4–10.5)

## 2024-03-10 PROCEDURE — 80053 COMPREHEN METABOLIC PANEL: CPT

## 2024-03-10 PROCEDURE — 6370000000 HC RX 637 (ALT 250 FOR IP): Performed by: PHYSICIAN ASSISTANT

## 2024-03-10 PROCEDURE — 99285 EMERGENCY DEPT VISIT HI MDM: CPT

## 2024-03-10 PROCEDURE — 85025 COMPLETE CBC W/AUTO DIFF WBC: CPT

## 2024-03-10 PROCEDURE — 30903 CONTROL OF NOSEBLEED: CPT

## 2024-03-10 PROCEDURE — 2500000003 HC RX 250 WO HCPCS: Performed by: PHYSICIAN ASSISTANT

## 2024-03-10 RX ORDER — OXYMETAZOLINE HYDROCHLORIDE 0.05 G/100ML
2 SPRAY NASAL ONCE
Status: COMPLETED | OUTPATIENT
Start: 2024-03-10 | End: 2024-03-10

## 2024-03-10 RX ORDER — LIDOCAINE HYDROCHLORIDE AND EPINEPHRINE BITARTRATE 20; .01 MG/ML; MG/ML
20 INJECTION, SOLUTION SUBCUTANEOUS ONCE
Status: COMPLETED | OUTPATIENT
Start: 2024-03-10 | End: 2024-03-10

## 2024-03-10 RX ORDER — TRANEXAMIC ACID 100 MG/ML
1000 INJECTION, SOLUTION INTRAVENOUS ONCE
Status: COMPLETED | OUTPATIENT
Start: 2024-03-10 | End: 2024-03-10

## 2024-03-10 RX ADMIN — OXYMETAZOLINE HYDROCHLORIDE 2 SPRAY: 0.05 SPRAY NASAL at 10:30

## 2024-03-10 RX ADMIN — LIDOCAINE HYDROCHLORIDE,EPINEPHRINE BITARTRATE 20 ML: 20; .01 INJECTION, SOLUTION INFILTRATION; PERINEURAL at 10:30

## 2024-03-10 RX ADMIN — TRANEXAMIC ACID 1000 MG: 100 INJECTION, SOLUTION INTRAVENOUS at 10:30

## 2024-03-10 ASSESSMENT — PAIN - FUNCTIONAL ASSESSMENT: PAIN_FUNCTIONAL_ASSESSMENT: 0-10

## 2024-03-10 ASSESSMENT — PAIN SCALES - GENERAL: PAINLEVEL_OUTOF10: 0

## 2024-03-10 NOTE — ED PROVIDER NOTES
on Eliquis, vitals are stable, he is maintaining airway, plan to transfer          Transfer condition: stable    I am the Primary Clinician of Record.        All diagnostic, treatment, and disposition decisions were made by myself in conjunction with the advanced practice provider.    I personally saw the patient and made/approved the management plan and take responsibility for the patient management      For all further details of the patient's emergency department visit, please see the advanced practice provider's documentation.    Comment: Please note this report has been produced using speech recognition software and may contain errors related to that system including errors in grammar, punctuation, and spelling, as well as words and phrases that may be inappropriate. If there are any questions or concerns please feel free to contact the dictating provider for clarification.        Vandana Zamora MD  03/10/24 9108      Bleeding has slowed, still little bit in oropharynx, right 7.5cm Rhino Rocket in place.  Rechecked.  He is not hypoxic, not short of breath.  Hemoglobin is lower than his typical, we will continue to monitor, he is not hypotensive.  Transport pending     Vandana Zamora MD  03/10/24 0588    
with patient and agreed with work up and disposition.  Discussion with Other Profesionals : Admitting Team evaluate hospitalist Dr. Florez      Records Reviewed : None    Escalation of care, including admission/OBS considered : Patient to be transferred         Disposition Considerations (tests considered but not done, Shared Decision Making, Pt Expectation of Test or Tx.): none (unless indicated in ED Course, Summary, Reassessment, or MDM     Chronic conditions affecting care:    has a past medical history of Arthritis, Former smoker (2/27/2019), GERD (gastroesophageal reflux disease), H/O echocardiogram (09/22/2020), History of nuclear stress test (09/23/2020), fall, Hyperlipidemia, Hypertension, Obstructive sleep apnea (2/27/2019), Pleural effusion, Shortness of breath (2/27/2019), and Unspecified cerebral artery occlusion with cerebral infarction (1997).    Social Determinants Significantly Affecting  Health:  Social Determinants of Health : None         I am the Primary Clinician of Record.       Is this patient to be included in the SEP-1 Core Measure due to severe sepsis or septic shock?   No   Exclusion criteria - the patient is NOT to be included for SEP-1 Core Measure due to:  2+ SIRS criteria are not met    PROCEDURES   Unless otherwise noted  none     Epistaxis Mgmt    Date/Time: 3/10/2024 8:58 PM    Performed by: Saul Phelps PA-C  Authorized by: Vandana Zamora MD    Consent:     Consent obtained:  Verbal    Consent given by:  Patient    Risks, benefits, and alternatives were discussed: yes      Risks discussed:  Bleeding, infection, nasal injury and pain    Alternatives discussed:  Referral and delayed treatment  Universal protocol:     Procedure explained and questions answered to patient or proxy's satisfaction: yes      Patient identity confirmed:  Verbally with patient  Anesthesia:     Anesthesia method:  Topical application    Topical anesthesia: 2% lidocaine with epi.  Procedure details:

## 2024-03-10 NOTE — ED TRIAGE NOTES
Pt arrives from home with complaint of nose bleed. No active bleeding on arrival. Pt has hx of dementia. Wears O2 @ baseline @ 2L/min via NC, arrives on basleine O2.

## 2024-03-10 NOTE — ED NOTES
Pt care transferred to [facility] at this time, pt ambulated with assistance to [place] cot.

## 2024-03-10 NOTE — ED NOTES
Spoke with Lashanda at [facility] transfer center - transferred call to Sha PHILLIPS. @1114    Lashanda called back with hospitalist on the line for Sha PHILLIPS @1120. Waiting on bed assignment.     Lashanda called back with bed assignment. Pt accepted to [facility] by Dr. Florez. Going to [facility], room 6536. Nurse to nurse report number is [phone]. Pending transport.     [facility] ETA [id number]

## 2024-03-24 ENCOUNTER — HOSPITAL ENCOUNTER (INPATIENT)
Age: 74
LOS: 2 days | Discharge: HOME HEALTH | DRG: 871 | End: 2024-03-26
Attending: EMERGENCY MEDICINE | Admitting: INTERNAL MEDICINE
Payer: MEDICARE

## 2024-03-24 ENCOUNTER — APPOINTMENT (OUTPATIENT)
Dept: CT IMAGING | Age: 74
DRG: 871 | End: 2024-03-24
Payer: MEDICARE

## 2024-03-24 ENCOUNTER — APPOINTMENT (OUTPATIENT)
Dept: GENERAL RADIOLOGY | Age: 74
DRG: 871 | End: 2024-03-24
Payer: MEDICARE

## 2024-03-24 DIAGNOSIS — N10 ACUTE PYELONEPHRITIS: Primary | ICD-10-CM

## 2024-03-24 DIAGNOSIS — N13.9 URINARY OBSTRUCTION: ICD-10-CM

## 2024-03-24 DIAGNOSIS — R06.02 SHORTNESS OF BREATH: ICD-10-CM

## 2024-03-24 PROBLEM — A41.9 SEPSIS (HCC): Status: ACTIVE | Noted: 2024-03-24

## 2024-03-24 LAB
ALBUMIN SERPL-MCNC: 4 GM/DL (ref 3.4–5)
ALP BLD-CCNC: 120 IU/L (ref 40–129)
ALT SERPL-CCNC: 11 U/L (ref 10–40)
ANION GAP SERPL CALCULATED.3IONS-SCNC: 9 MMOL/L (ref 7–16)
AST SERPL-CCNC: 13 IU/L (ref 15–37)
B PARAP IS1001 DNA NPH QL NAA+NON-PROBE: NOT DETECTED
B PERT.PT PRMT NPH QL NAA+NON-PROBE: NOT DETECTED
BASOPHILS ABSOLUTE: 0 K/CU MM
BASOPHILS RELATIVE PERCENT: 0 % (ref 0–1)
BILIRUB SERPL-MCNC: 0.7 MG/DL (ref 0–1)
BILIRUBIN URINE: NEGATIVE MG/DL
BLOOD, URINE: NEGATIVE
BUN SERPL-MCNC: 26 MG/DL (ref 6–23)
C PNEUM DNA NPH QL NAA+NON-PROBE: NOT DETECTED
CALCIUM SERPL-MCNC: 9.1 MG/DL (ref 8.3–10.6)
CHLORIDE BLD-SCNC: 101 MMOL/L (ref 99–110)
CLARITY: ABNORMAL
CO2: 30 MMOL/L (ref 21–32)
COLOR: YELLOW
CREAT SERPL-MCNC: 1.3 MG/DL (ref 0.9–1.3)
CRP SERPL HS-MCNC: 30 MG/L
DIFFERENTIAL TYPE: ABNORMAL
EOSINOPHILS ABSOLUTE: 0 K/CU MM
EOSINOPHILS RELATIVE PERCENT: 0.2 % (ref 0–3)
FLUAV H1 2009 PAN RNA NPH NAA+NON-PROBE: NOT DETECTED
FLUAV H1 RNA NPH QL NAA+NON-PROBE: NOT DETECTED
FLUAV H3 RNA NPH QL NAA+NON-PROBE: NOT DETECTED
FLUAV RNA NPH QL NAA+NON-PROBE: NOT DETECTED
FLUBV RNA NPH QL NAA+NON-PROBE: NOT DETECTED
GFR SERPL CREATININE-BSD FRML MDRD: 58 ML/MIN/1.73M2
GLUCOSE SERPL-MCNC: 133 MG/DL (ref 70–99)
GLUCOSE, URINE: NEGATIVE MG/DL
HADV DNA NPH QL NAA+NON-PROBE: NOT DETECTED
HCOV 229E RNA NPH QL NAA+NON-PROBE: NOT DETECTED
HCOV HKU1 RNA NPH QL NAA+NON-PROBE: NOT DETECTED
HCOV NL63 RNA NPH QL NAA+NON-PROBE: NOT DETECTED
HCOV OC43 RNA NPH QL NAA+NON-PROBE: NOT DETECTED
HCT VFR BLD CALC: 32 % (ref 42–52)
HEMOGLOBIN: 9.6 GM/DL (ref 13.5–18)
HMPV RNA NPH QL NAA+NON-PROBE: NOT DETECTED
HPIV1 RNA NPH QL NAA+NON-PROBE: NOT DETECTED
HPIV2 RNA NPH QL NAA+NON-PROBE: NOT DETECTED
HPIV3 RNA NPH QL NAA+NON-PROBE: NOT DETECTED
HPIV4 RNA NPH QL NAA+NON-PROBE: NOT DETECTED
IMMATURE NEUTROPHIL %: 0.4 % (ref 0–0.43)
KETONES, URINE: NEGATIVE MG/DL
LACTATE: 1.3 MMOL/L (ref 0.5–1.9)
LEUKOCYTE ESTERASE, URINE: ABNORMAL
LYMPHOCYTES ABSOLUTE: 0.5 K/CU MM
LYMPHOCYTES RELATIVE PERCENT: 5.2 % (ref 24–44)
M PNEUMO DNA NPH QL NAA+NON-PROBE: NOT DETECTED
MCH RBC QN AUTO: 30.3 PG (ref 27–31)
MCHC RBC AUTO-ENTMCNC: 30 % (ref 32–36)
MCV RBC AUTO: 100.9 FL (ref 78–100)
MONOCYTES ABSOLUTE: 0.7 K/CU MM
MONOCYTES RELATIVE PERCENT: 7.5 % (ref 0–4)
NITRITE URINE, QUANTITATIVE: NEGATIVE
NUCLEATED RBC %: 0 %
PDW BLD-RTO: 14.5 % (ref 11.7–14.9)
PH, URINE: 6.5 (ref 5–8)
PLATELET # BLD: 193 K/CU MM (ref 140–440)
PMV BLD AUTO: 10.4 FL (ref 7.5–11.1)
POTASSIUM SERPL-SCNC: 4.5 MMOL/L (ref 3.5–5.1)
PROCALCITONIN SERPL-MCNC: 0.07 NG/ML
PROTEIN UA: ABNORMAL MG/DL
RBC # BLD: 3.17 M/CU MM (ref 4.6–6.2)
RSV RNA NPH QL NAA+NON-PROBE: NOT DETECTED
RV+EV RNA NPH QL NAA+NON-PROBE: NOT DETECTED
SARS-COV-2 RNA NPH QL NAA+NON-PROBE: NOT DETECTED
SEGMENTED NEUTROPHILS ABSOLUTE COUNT: 8 K/CU MM
SEGMENTED NEUTROPHILS RELATIVE PERCENT: 86.7 % (ref 36–66)
SODIUM BLD-SCNC: 140 MMOL/L (ref 135–145)
SPECIFIC GRAVITY UA: 1 (ref 1–1.03)
TOTAL IMMATURE NEUTOROPHIL: 0.04 K/CU MM
TOTAL NUCLEATED RBC: 0 K/CU MM
TOTAL PROTEIN: 7.5 GM/DL (ref 6.4–8.2)
UROBILINOGEN, URINE: 0.2 MG/DL (ref 0.2–1)
WBC # BLD: 9.2 K/CU MM (ref 4–10.5)

## 2024-03-24 PROCEDURE — 6360000002 HC RX W HCPCS: Performed by: PHYSICIAN ASSISTANT

## 2024-03-24 PROCEDURE — 2580000003 HC RX 258: Performed by: PHYSICIAN ASSISTANT

## 2024-03-24 PROCEDURE — 99285 EMERGENCY DEPT VISIT HI MDM: CPT

## 2024-03-24 PROCEDURE — 84145 PROCALCITONIN (PCT): CPT

## 2024-03-24 PROCEDURE — 86140 C-REACTIVE PROTEIN: CPT

## 2024-03-24 PROCEDURE — 80053 COMPREHEN METABOLIC PANEL: CPT

## 2024-03-24 PROCEDURE — 51702 INSERT TEMP BLADDER CATH: CPT

## 2024-03-24 PROCEDURE — 96365 THER/PROPH/DIAG IV INF INIT: CPT

## 2024-03-24 PROCEDURE — 87186 SC STD MICRODIL/AGAR DIL: CPT

## 2024-03-24 PROCEDURE — 6360000004 HC RX CONTRAST MEDICATION: Performed by: EMERGENCY MEDICINE

## 2024-03-24 PROCEDURE — 71045 X-RAY EXAM CHEST 1 VIEW: CPT

## 2024-03-24 PROCEDURE — 87040 BLOOD CULTURE FOR BACTERIA: CPT

## 2024-03-24 PROCEDURE — 87077 CULTURE AEROBIC IDENTIFY: CPT

## 2024-03-24 PROCEDURE — 81003 URINALYSIS AUTO W/O SCOPE: CPT

## 2024-03-24 PROCEDURE — 6370000000 HC RX 637 (ALT 250 FOR IP): Performed by: EMERGENCY MEDICINE

## 2024-03-24 PROCEDURE — 6370000000 HC RX 637 (ALT 250 FOR IP): Performed by: PHYSICIAN ASSISTANT

## 2024-03-24 PROCEDURE — 74177 CT ABD & PELVIS W/CONTRAST: CPT

## 2024-03-24 PROCEDURE — 87086 URINE CULTURE/COLONY COUNT: CPT

## 2024-03-24 PROCEDURE — 2580000003 HC RX 258: Performed by: EMERGENCY MEDICINE

## 2024-03-24 PROCEDURE — 0202U NFCT DS 22 TRGT SARS-COV-2: CPT

## 2024-03-24 PROCEDURE — 85025 COMPLETE CBC W/AUTO DIFF WBC: CPT

## 2024-03-24 PROCEDURE — 6360000002 HC RX W HCPCS: Performed by: EMERGENCY MEDICINE

## 2024-03-24 PROCEDURE — 1200000000 HC SEMI PRIVATE

## 2024-03-24 PROCEDURE — 83605 ASSAY OF LACTIC ACID: CPT

## 2024-03-24 PROCEDURE — 2700000000 HC OXYGEN THERAPY PER DAY

## 2024-03-24 RX ORDER — GABAPENTIN 100 MG/1
100 CAPSULE ORAL 4 TIMES DAILY
Status: DISCONTINUED | OUTPATIENT
Start: 2024-03-24 | End: 2024-03-26 | Stop reason: HOSPADM

## 2024-03-24 RX ORDER — ASPIRIN 81 MG/1
81 TABLET, CHEWABLE ORAL DAILY
Status: DISCONTINUED | OUTPATIENT
Start: 2024-03-24 | End: 2024-03-26

## 2024-03-24 RX ORDER — METOPROLOL SUCCINATE 50 MG/1
50 TABLET, EXTENDED RELEASE ORAL DAILY
Status: DISCONTINUED | OUTPATIENT
Start: 2024-03-24 | End: 2024-03-25

## 2024-03-24 RX ORDER — ACETAMINOPHEN 650 MG/1
650 SUPPOSITORY RECTAL EVERY 6 HOURS PRN
Status: DISCONTINUED | OUTPATIENT
Start: 2024-03-24 | End: 2024-03-26 | Stop reason: HOSPADM

## 2024-03-24 RX ORDER — ACETAMINOPHEN 325 MG/1
650 TABLET ORAL EVERY 6 HOURS PRN
Status: DISCONTINUED | OUTPATIENT
Start: 2024-03-24 | End: 2024-03-26 | Stop reason: HOSPADM

## 2024-03-24 RX ORDER — SODIUM CHLORIDE 0.9 % (FLUSH) 0.9 %
5-40 SYRINGE (ML) INJECTION EVERY 12 HOURS SCHEDULED
Status: DISCONTINUED | OUTPATIENT
Start: 2024-03-24 | End: 2024-03-26 | Stop reason: HOSPADM

## 2024-03-24 RX ORDER — FINASTERIDE 5 MG/1
5 TABLET, FILM COATED ORAL DAILY
Status: DISCONTINUED | OUTPATIENT
Start: 2024-03-24 | End: 2024-03-26 | Stop reason: HOSPADM

## 2024-03-24 RX ORDER — DIAZEPAM 2 MG/1
2 TABLET ORAL EVERY 12 HOURS PRN
Status: DISCONTINUED | OUTPATIENT
Start: 2024-03-24 | End: 2024-03-26 | Stop reason: HOSPADM

## 2024-03-24 RX ORDER — PREGABALIN 100 MG/1
100 CAPSULE ORAL 2 TIMES DAILY
COMMUNITY

## 2024-03-24 RX ORDER — DIAZEPAM 5 MG/1
5 TABLET ORAL EVERY 12 HOURS PRN
Status: ON HOLD | COMMUNITY
End: 2024-03-26 | Stop reason: HOSPADM

## 2024-03-24 RX ORDER — ALBUTEROL SULFATE 90 UG/1
2 AEROSOL, METERED RESPIRATORY (INHALATION) EVERY 4 HOURS PRN
Status: DISCONTINUED | OUTPATIENT
Start: 2024-03-24 | End: 2024-03-26 | Stop reason: HOSPADM

## 2024-03-24 RX ORDER — FINASTERIDE 5 MG/1
5 TABLET, FILM COATED ORAL DAILY
COMMUNITY

## 2024-03-24 RX ORDER — ACETAMINOPHEN 500 MG
1000 TABLET ORAL ONCE
Status: DISCONTINUED | OUTPATIENT
Start: 2024-03-24 | End: 2024-03-24 | Stop reason: SDUPTHER

## 2024-03-24 RX ORDER — CITALOPRAM 20 MG/1
10 TABLET ORAL 2 TIMES DAILY
Status: DISCONTINUED | OUTPATIENT
Start: 2024-03-24 | End: 2024-03-26 | Stop reason: HOSPADM

## 2024-03-24 RX ORDER — ACETAMINOPHEN 500 MG
1000 TABLET ORAL ONCE
Status: COMPLETED | OUTPATIENT
Start: 2024-03-24 | End: 2024-03-24

## 2024-03-24 RX ORDER — METOPROLOL SUCCINATE 100 MG/1
100 TABLET, EXTENDED RELEASE ORAL DAILY
Status: ON HOLD | COMMUNITY
End: 2024-03-26 | Stop reason: HOSPADM

## 2024-03-24 RX ORDER — ATORVASTATIN CALCIUM 40 MG/1
40 TABLET, FILM COATED ORAL NIGHTLY
Status: DISCONTINUED | OUTPATIENT
Start: 2024-03-24 | End: 2024-03-26 | Stop reason: HOSPADM

## 2024-03-24 RX ORDER — SODIUM CHLORIDE 9 MG/ML
INJECTION, SOLUTION INTRAVENOUS PRN
Status: DISCONTINUED | OUTPATIENT
Start: 2024-03-24 | End: 2024-03-26 | Stop reason: HOSPADM

## 2024-03-24 RX ORDER — SODIUM CHLORIDE 0.9 % (FLUSH) 0.9 %
5-40 SYRINGE (ML) INJECTION PRN
Status: DISCONTINUED | OUTPATIENT
Start: 2024-03-24 | End: 2024-03-26 | Stop reason: HOSPADM

## 2024-03-24 RX ORDER — 0.9 % SODIUM CHLORIDE 0.9 %
500 INTRAVENOUS SOLUTION INTRAVENOUS ONCE
Status: COMPLETED | OUTPATIENT
Start: 2024-03-24 | End: 2024-03-24

## 2024-03-24 RX ORDER — PANTOPRAZOLE SODIUM 40 MG/1
40 TABLET, DELAYED RELEASE ORAL
Status: DISCONTINUED | OUTPATIENT
Start: 2024-03-25 | End: 2024-03-26 | Stop reason: HOSPADM

## 2024-03-24 RX ADMIN — ACETAMINOPHEN 650 MG: 325 TABLET ORAL at 16:55

## 2024-03-24 RX ADMIN — CEFTRIAXONE 1000 MG: 1 INJECTION, POWDER, FOR SOLUTION INTRAMUSCULAR; INTRAVENOUS at 08:52

## 2024-03-24 RX ADMIN — VANCOMYCIN HYDROCHLORIDE 2000 MG: 5 INJECTION, POWDER, LYOPHILIZED, FOR SOLUTION INTRAVENOUS at 14:25

## 2024-03-24 RX ADMIN — IOPAMIDOL 75 ML: 755 INJECTION, SOLUTION INTRAVENOUS at 09:13

## 2024-03-24 RX ADMIN — SODIUM CHLORIDE 500 ML: 9 INJECTION, SOLUTION INTRAVENOUS at 11:45

## 2024-03-24 RX ADMIN — ACETAMINOPHEN 650 MG: 325 TABLET ORAL at 00:22

## 2024-03-24 RX ADMIN — DIAZEPAM 2 MG: 2 TABLET ORAL at 17:12

## 2024-03-24 RX ADMIN — ACETAMINOPHEN 1000 MG: 500 TABLET ORAL at 08:24

## 2024-03-24 ASSESSMENT — PAIN - FUNCTIONAL ASSESSMENT: PAIN_FUNCTIONAL_ASSESSMENT: NONE - DENIES PAIN

## 2024-03-24 ASSESSMENT — PAIN SCALES - GENERAL: PAINLEVEL_OUTOF10: 0

## 2024-03-24 NOTE — ED PROVIDER NOTES
Exam:  Triage VS:    ED Triage Vitals [03/24/24 3273]   Enc Vitals Group      BP (!) 142/130      Pulse (!) 101      Respirations 22      Temp 98 °F (36.7 °C)      Temp Source Oral      SpO2 96 %      Weight - Scale 100.2 kg (221 lb)      Height       Head Circumference       Peak Flow       Pain Score       Pain Loc       Pain Edu?       Excl. in GC?        My pulse ox interpretation is - normal    General appearance:  No acute distress.   Skin:  Warm. Dry.   Eye:  Extraocular movements intact.     Ears, nose, mouth and throat:  Oral mucosa moist   Neck: Neck supple with no meningismal signs.  Trachea midline.   Extremity:  No swelling.  Normal ROM     Heart:  Regular rate and rhythm, normal S1 & S2, no extra heart sounds.    Perfusion:  intact  Respiratory:  Lungs clear to auscultation bilaterally.  Respirations nonlabored.     Abdominal:  Normal bowel sounds.  Soft.  Nontender.  Non distended.  Back:  No CVA tenderness to palpation     Neurological:  Alert and oriented times 3.  No focal neuro deficits.             Psychiatric:  Appropriate    I have reviewed and interpreted all of the currently available lab results from this visit (if applicable):  Results for orders placed or performed during the hospital encounter of 03/24/24   Urinalysis   Result Value Ref Range    Color, UA YELLOW (A) YELLOW    Clarity, UA TURBID (A) CLEAR    Glucose, Urine NEGATIVE NEGATIVE MG/DL    Bilirubin Urine NEGATIVE NEGATIVE MG/DL    Ketones, Urine NEGATIVE NEGATIVE MG/DL    Specific Gravity, UA 1.005 1.001 - 1.035    Blood, Urine NEGATIVE NEGATIVE    pH, Urine 6.5 5.0 - 8.0    Protein, UA 30+ (A) NEGATIVE MG/DL    Urobilinogen, Urine 0.2 0.2 - 1.0 MG/DL    Nitrite Urine, Quantitative NEGATIVE NEGATIVE    Leukocyte Esterase, Urine LARGE (A) NEGATIVE   CBC with Auto Differential   Result Value Ref Range    WBC 9.2 4.0 - 10.5 K/CU MM    RBC 3.17 (L) 4.6 - 6.2 M/CU MM    Hemoglobin 9.6 (L) 13.5 - 18.0 GM/DL    Hematocrit 32.0 (L) 42 -

## 2024-03-24 NOTE — CONSULTS
[address] [address] [place] [address]                              CONSULTATION      PATIENT NAME: SANGEETHA SOLOMON                : 1950  MED REC NO: [id number]                      ROOM: 3006  ACCOUNT NO: [id number]                       ADMIT DATE: 2024  PROVIDER: Nallely Smith CNP    CARDIOLOGY CONSULT NOTE      INDICATIONS:  Heart failure, pericardial effusion.    HISTORY OF PRESENT ILLNESS:  The patient is a 73-year-old male with past medical history significant for heart failure, obstructive sleep apnea, hyperlipidemia, hypertension, who initially presented with rigors, fever, urinary retention.  History is mainly obtained from the patient's cousin who is at bedside as the patient is confused at baseline.  The cousin states the patient woke early this morning not feeling well and was shaking.  The patient also noted that he was unable to urinate on his own.  In the Emergency Department, the patient was found to have acute pyelonephritis, urinary retention, and was admitted with sepsis secondary to acute pyelonephritis.  Small pericardial effusion was noted on CT in the ED.  Cardiology was consulted for further evaluation and management of pericardial effusion and CHF.  The patient is confused at baseline and is unable to give me a complete history.    PAST MEDICAL HISTORY:  Obtained from chart and family member includes hypertension, hyperlipidemia, obstructive sleep apnea, atrial fibrillation, GERD, CVA, coronary artery disease, last catheterization in 2022.    PAST SURGICAL HISTORY:  Joint replacement, colonoscopy.    SOCIAL HISTORY:  Former smoker.  No current alcohol use.    ALLERGIES:  NO KNOWN ALLERGIES.      HOME MEDICATIONS:  Tylenol, albuterol, Eliquis, aspirin, Lipitor, Celexa, Valium, Nexium, Proscar, Lasix, Neurontin, metoprolol, Lyrica, tramadol, and tiotropium/olodaterol.    REVIEW OF SYSTEMS:  A

## 2024-03-24 NOTE — ED TRIAGE NOTES
Patient complaining of headache and fever. Cold and shaky. In [facility] for epistaxis and was d/c'ed 3/20/24. Caregiver states he has dementia.

## 2024-03-24 NOTE — CONSULTS
[address] [address]   Consult Note  [facility] 1 2 3 4 5    Date: 3/24/2024   Patient: Clau Bueno   : 1950   DOA: 3/24/2024   MRN: [id number]   ROOM#: 3006/3006-A     Reason for Consult: Urine retention, ?Urosepsis  Requesting Provider: Yulissa Gómez PA-C  Collaborating Urologist on Call at time of admission: Dr. Riley  CHIEF COMPLAINT: Fever/chills and weakness/fatigue    History Obtained From: electronic medical record    HISTORY OF PRESENT ILLNESS:                The patient is a 73 y.o. male with significant past medical history of dementia, CVA and A-fib on ASA/Eliquis, CHF, chronic respiratory failure, arthritis, GERD, hypertension, hyperlipidemia, and GABRIEL who presented with fever/chills and weakness.  History obtained from patient's family.  Febrile at 103.2 on arrival, 1 hypotensive pressure noted but otherwise VSS.  WBC 9.2.  Creatinine 1.3.  UA showed large leuks.  CT abdomen/pelvis revealed likely passive bilateral hydronephrosis with marked distention of the urinary bladder with streakiness of the perirenal fat bilaterally concerning for possible pyelonephritis.  Patient admitted for further management.  Garrett was placed.  Currently, patient is resting comfortably.  Does not awaken during my exam.  Family at bedside.    ED Provider's HPI 3/24/2024: Clau Bueno is a 73 y.o. male history of stroke, dementia, obstructive sleep apnea, hypertension that presents to emergency department complaints of acute chills which started 4 AM this morning without actual measurable fever and not associated with runny nose, nasal congestion, sore throat, cough or bodyaches.  Patient states he has been having some mild intermittent headaches.  His wife states he fell 3 weeks ago and \"we do not know if he hit his head\".  He supposedly is on blood thinners.  Patient is currently denying having headache or neck pain.  Patient denies chest or abdominal pain or diarrhea or vomiting or dysuria.

## 2024-03-24 NOTE — H&P
V2.0  History and Physical      Name:  Clau Bueno /Age/Sex: 1950  (73 y.o. male)   MRN & CSN:  [id number] & [id number] Encounter Date/Time: 3/24/2024 10:39 AM EDT   Location:  ED25/ED-25 PCP: Yuan Sol MD       Hospital Day: 1    Assessment and Plan:   Clau Bueno is a 73 y.o. male who presents with rigors, fever, urinary retention.    Sepsis 2/2 to acute pyelonephritis   - presented with rigors, decreased UOP  - sepsis evidenced by tachycardia, tachypnea, Tmax 103.2  -Lactic acid WNL, no leukocytosis  - BP soft in ED  - UA with large LE  - urine culture  with >100K enterococcus ( family reports more recent UTI treated with several weeks of abx)  - CT AP with hydronephrosis, streakiness of perirenal fat   - 30cc/kg bolus NOT administered in ED due to history of CHF. Received 500cc bolus   - given IV Rocephin x1 in ED, continue Rocephin, add vancomycin due to history of enterococcus   - follow-up urine culture, blood cultures, procal, CRP    Bilateral hydronephrosis   Urinary retention   - CT A/P with bilateral hydronephrosis with marked distention of the bladder  - Cr 1.3, near recent baseline   -Ordered Garrett catheter placement  -Continue home finasteride  -Consult urology, appreciate recommendations    Pericardial effusion   - small, noted on admit CT  - HDS, on baseline oxygen  - ordered echo   - follows with Dr. Rizo, will consult    History of CHF   - last echo on file  with EF 50%, moderate LVH  -Holding beta-blocker, Lasix due to soft BP and sepsis  -Repeat echo ordered  -Cardiology on consult in setting of above    Acute on chronic anemia 2/2 blood loss  - Hgb 9.6, last Hgb 3/10/24 10.4, but previous baseline ~ 13  - has recent admission at [facility] for epistaxis  -No signs of active bleeding on admission  -Monitor H&H    Chronic respiratory failure with hypoxia  -On 3L NC at baseline, no increasing oxygen requirements on admission    HTN   -Holding home BP meds due to soft

## 2024-03-24 NOTE — ED NOTES
Patient and patients caregiver updated on plan of care. Verbalizes understanding and denies any further needs, questions, concerns or complaints.  Patients temp. Increased to 103.2. Dr. Caal notified and orders received. Patient remains on cardiac monitor.

## 2024-03-24 NOTE — CONSULTS
I have personally seen and examined this patient.  I have fully participated in the care of this patient and I have reviewed and agree with all pertinent clinical information including history, physical exam, and plan.  See my impression and plan below.      This is 73-year-old gentleman that were consulted for recurrent effusion.  Patient currently drowsy family at the bedside.  Await for 2D echo to evaluate cardial effusion.  Hemodynamically stable not concerned about tamponade.    Electronically signed by Lilian De La Cruz DO on 3/24/24 at 2:31 PM EDT    Dictated: [id number]    Patient drowsy, confused at baseline   History obtained from cousin who is at bedside    Pericardial effusion  - noted on admit CT, slightly increased from previous   - check echo    HFrEF  - EF 40% on last echo  - Holding lasix due to soft BP and sepsis  - continue beta blocker    HTN  - holding home BP meds due to soft BP    Atrial fibrillation   - continue Eliquis and beta blocker     Sepsis 2/2 acute pyelonephritis  - on IV abx  - management per primary     Hx dementia  - confused at baseline           CT abdomen pelvis 3/24/2024    IMPRESSION:  1. Likely passive bilateral hydronephrosis with marked distention of the bladder. Streakiness does surround the perirenal fat bilaterally for which I cannot exclude pyelonephritis. Recommend urinalysis after Garrett catheter placed.   2. Interval increased small pericardial effusion, small bilateral pleural effusions.  3. Cardiac megaly  4. Small sliding-type hiatal hernia    Echo 2/21/2023  - LV systolic dysfunction, EF 40%  Concentric left ventricular hypertrophy  Left atrium significantly dilated  RV function and size within normal limits, pulmonary artery pressure 31.17 mmHg  Right atrium enlarged  Mild to moderate mitral and aortic regurgitation. Mitral annular calcification. Sclerotic aortic valve  Mild to moderate pericardial effusion: no hemodynamic significance  Diastolic

## 2024-03-25 ENCOUNTER — APPOINTMENT (OUTPATIENT)
Dept: NON INVASIVE DIAGNOSTICS | Age: 74
DRG: 871 | End: 2024-03-25
Attending: INTERNAL MEDICINE
Payer: MEDICARE

## 2024-03-25 LAB
ALBUMIN SERPL-MCNC: 3.5 GM/DL (ref 3.4–5)
ALP BLD-CCNC: 102 IU/L (ref 40–128)
ALT SERPL-CCNC: 11 U/L (ref 10–40)
ANION GAP SERPL CALCULATED.3IONS-SCNC: 14 MMOL/L (ref 7–16)
AST SERPL-CCNC: 15 IU/L (ref 15–37)
BASOPHILS ABSOLUTE: 0 K/CU MM
BASOPHILS RELATIVE PERCENT: 0.1 % (ref 0–1)
BILIRUB SERPL-MCNC: 0.9 MG/DL (ref 0–1)
BUN SERPL-MCNC: 25 MG/DL (ref 6–23)
CALCIUM SERPL-MCNC: 8.8 MG/DL (ref 8.3–10.6)
CHLORIDE BLD-SCNC: 103 MMOL/L (ref 99–110)
CO2: 25 MMOL/L (ref 21–32)
CREAT SERPL-MCNC: 1.2 MG/DL (ref 0.9–1.3)
DIFFERENTIAL TYPE: ABNORMAL
ECHO AO ROOT DIAM: 3.6 CM
ECHO AO ROOT INDEX: 1.62 CM/M2
ECHO AV AREA PEAK VELOCITY: 1.6 CM2
ECHO AV AREA VTI: 1.6 CM2
ECHO AV AREA/BSA PEAK VELOCITY: 0.7 CM2/M2
ECHO AV AREA/BSA VTI: 0.7 CM2/M2
ECHO AV MEAN GRADIENT: 8 MMHG
ECHO AV MEAN VELOCITY: 1.3 M/S
ECHO AV PEAK GRADIENT: 14 MMHG
ECHO AV PEAK VELOCITY: 1.9 M/S
ECHO AV VELOCITY RATIO: 0.47
ECHO AV VTI: 30.9 CM
ECHO BSA: 2.26 M2
ECHO EST RA PRESSURE: 15 MMHG
ECHO IVC PROX: 2.5 CM
ECHO LA AREA 4C: 28.7 CM2
ECHO LA MAJOR AXIS: 6.3 CM
ECHO LA VOL MOD A4C: 106 ML (ref 18–58)
ECHO LA VOLUME INDEX MOD A4C: 48 ML/M2 (ref 16–34)
ECHO LV E' LATERAL VELOCITY: 9 CM/S
ECHO LV E' SEPTAL VELOCITY: 7 CM/S
ECHO LV EDV A4C: 98 ML
ECHO LV EDV INDEX A4C: 44 ML/M2
ECHO LV EJECTION FRACTION A4C: 48 %
ECHO LV ESV A4C: 51 ML
ECHO LV ESV INDEX A4C: 23 ML/M2
ECHO LV FRACTIONAL SHORTENING: 28 % (ref 28–44)
ECHO LV INTERNAL DIMENSION DIASTOLE INDEX: 2.43 CM/M2
ECHO LV INTERNAL DIMENSION DIASTOLIC: 5.4 CM (ref 4.2–5.9)
ECHO LV INTERNAL DIMENSION SYSTOLIC INDEX: 1.76 CM/M2
ECHO LV INTERNAL DIMENSION SYSTOLIC: 3.9 CM
ECHO LV IVSD: 1.2 CM (ref 0.6–1)
ECHO LV MASS 2D: 295.6 G (ref 88–224)
ECHO LV MASS INDEX 2D: 133.1 G/M2 (ref 49–115)
ECHO LV POSTERIOR WALL DIASTOLIC: 1.4 CM (ref 0.6–1)
ECHO LV RELATIVE WALL THICKNESS RATIO: 0.52
ECHO LVOT AREA: 3.5 CM2
ECHO LVOT AV VTI INDEX: 0.46
ECHO LVOT DIAM: 2.1 CM
ECHO LVOT MEAN GRADIENT: 1 MMHG
ECHO LVOT PEAK GRADIENT: 3 MMHG
ECHO LVOT PEAK VELOCITY: 0.9 M/S
ECHO LVOT STROKE VOLUME INDEX: 22.1 ML/M2
ECHO LVOT SV: 49.2 ML
ECHO LVOT VTI: 14.2 CM
ECHO MV E VELOCITY: 1.08 M/S
ECHO MV E/E' LATERAL: 12
ECHO MV E/E' RATIO (AVERAGED): 13.71
ECHO RIGHT VENTRICULAR SYSTOLIC PRESSURE (RVSP): 42 MMHG
ECHO RV MID DIMENSION: 4.1 CM
ECHO TV REGURGITANT MAX VELOCITY: 2.61 M/S
ECHO TV REGURGITANT PEAK GRADIENT: 27 MMHG
EOSINOPHILS ABSOLUTE: 0 K/CU MM
EOSINOPHILS RELATIVE PERCENT: 0 % (ref 0–3)
GFR SERPL CREATININE-BSD FRML MDRD: >60 ML/MIN/1.73M2
GLUCOSE SERPL-MCNC: 166 MG/DL (ref 70–99)
HCT VFR BLD CALC: 29.4 % (ref 42–52)
HEMOGLOBIN: 8.6 GM/DL (ref 13.5–18)
IMMATURE NEUTROPHIL %: 0.5 % (ref 0–0.43)
LYMPHOCYTES ABSOLUTE: 0.6 K/CU MM
LYMPHOCYTES RELATIVE PERCENT: 4.2 % (ref 24–44)
MCH RBC QN AUTO: 30.2 PG (ref 27–31)
MCHC RBC AUTO-ENTMCNC: 29.3 % (ref 32–36)
MCV RBC AUTO: 103.2 FL (ref 78–100)
MONOCYTES ABSOLUTE: 1.1 K/CU MM
MONOCYTES RELATIVE PERCENT: 7.1 % (ref 0–4)
NUCLEATED RBC %: 0 %
PDW BLD-RTO: 14.7 % (ref 11.7–14.9)
PLATELET # BLD: 168 K/CU MM (ref 140–440)
PMV BLD AUTO: 10.4 FL (ref 7.5–11.1)
POTASSIUM SERPL-SCNC: 4.1 MMOL/L (ref 3.5–5.1)
PRO-BNP: ABNORMAL PG/ML
RBC # BLD: 2.85 M/CU MM (ref 4.6–6.2)
SEGMENTED NEUTROPHILS ABSOLUTE COUNT: 13.4 K/CU MM
SEGMENTED NEUTROPHILS RELATIVE PERCENT: 88.1 % (ref 36–66)
SODIUM BLD-SCNC: 142 MMOL/L (ref 135–145)
TOTAL IMMATURE NEUTOROPHIL: 0.07 K/CU MM
TOTAL NUCLEATED RBC: 0 K/CU MM
TOTAL PROTEIN: 6.4 GM/DL (ref 6.4–8.2)
WBC # BLD: 15.1 K/CU MM (ref 4–10.5)

## 2024-03-25 PROCEDURE — 6370000000 HC RX 637 (ALT 250 FOR IP): Performed by: INTERNAL MEDICINE

## 2024-03-25 PROCEDURE — 97162 PT EVAL MOD COMPLEX 30 MIN: CPT

## 2024-03-25 PROCEDURE — 6370000000 HC RX 637 (ALT 250 FOR IP): Performed by: PHYSICIAN ASSISTANT

## 2024-03-25 PROCEDURE — 93306 TTE W/DOPPLER COMPLETE: CPT

## 2024-03-25 PROCEDURE — 6360000002 HC RX W HCPCS: Performed by: INTERNAL MEDICINE

## 2024-03-25 PROCEDURE — 83880 ASSAY OF NATRIURETIC PEPTIDE: CPT

## 2024-03-25 PROCEDURE — 2580000003 HC RX 258: Performed by: PHYSICIAN ASSISTANT

## 2024-03-25 PROCEDURE — 1200000000 HC SEMI PRIVATE

## 2024-03-25 PROCEDURE — 97530 THERAPEUTIC ACTIVITIES: CPT

## 2024-03-25 PROCEDURE — 94761 N-INVAS EAR/PLS OXIMETRY MLT: CPT

## 2024-03-25 PROCEDURE — 97166 OT EVAL MOD COMPLEX 45 MIN: CPT

## 2024-03-25 PROCEDURE — 2580000003 HC RX 258: Performed by: INTERNAL MEDICINE

## 2024-03-25 PROCEDURE — 80053 COMPREHEN METABOLIC PANEL: CPT

## 2024-03-25 PROCEDURE — 85025 COMPLETE CBC W/AUTO DIFF WBC: CPT

## 2024-03-25 PROCEDURE — 36415 COLL VENOUS BLD VENIPUNCTURE: CPT

## 2024-03-25 PROCEDURE — 6360000002 HC RX W HCPCS: Performed by: PHYSICIAN ASSISTANT

## 2024-03-25 PROCEDURE — 51702 INSERT TEMP BLADDER CATH: CPT

## 2024-03-25 PROCEDURE — 80048 BASIC METABOLIC PNL TOTAL CA: CPT

## 2024-03-25 PROCEDURE — 2700000000 HC OXYGEN THERAPY PER DAY

## 2024-03-25 RX ORDER — METOPROLOL SUCCINATE 25 MG/1
25 TABLET, EXTENDED RELEASE ORAL 2 TIMES DAILY
Status: DISCONTINUED | OUTPATIENT
Start: 2024-03-25 | End: 2024-03-26

## 2024-03-25 RX ORDER — METOPROLOL SUCCINATE 25 MG/1
25 TABLET, EXTENDED RELEASE ORAL DAILY
Status: DISCONTINUED | OUTPATIENT
Start: 2024-03-25 | End: 2024-03-25

## 2024-03-25 RX ORDER — HYDROXYZINE PAMOATE 25 MG/1
50 CAPSULE ORAL ONCE
Status: COMPLETED | OUTPATIENT
Start: 2024-03-26 | End: 2024-03-26

## 2024-03-25 RX ORDER — MIDODRINE HYDROCHLORIDE 5 MG/1
5 TABLET ORAL
Status: DISCONTINUED | OUTPATIENT
Start: 2024-03-25 | End: 2024-03-26

## 2024-03-25 RX ADMIN — ASPIRIN 81 MG: 81 TABLET, CHEWABLE ORAL at 08:22

## 2024-03-25 RX ADMIN — CEFEPIME 2000 MG: 2 INJECTION, POWDER, FOR SOLUTION INTRAVENOUS at 17:57

## 2024-03-25 RX ADMIN — PANTOPRAZOLE SODIUM 40 MG: 40 TABLET, DELAYED RELEASE ORAL at 08:22

## 2024-03-25 RX ADMIN — CITALOPRAM HYDROBROMIDE 10 MG: 20 TABLET ORAL at 00:10

## 2024-03-25 RX ADMIN — GABAPENTIN 100 MG: 100 CAPSULE ORAL at 19:45

## 2024-03-25 RX ADMIN — SODIUM CHLORIDE, PRESERVATIVE FREE 10 ML: 5 INJECTION INTRAVENOUS at 08:23

## 2024-03-25 RX ADMIN — ATORVASTATIN CALCIUM 40 MG: 40 TABLET, FILM COATED ORAL at 00:10

## 2024-03-25 RX ADMIN — ACETAMINOPHEN 650 MG: 325 TABLET ORAL at 19:44

## 2024-03-25 RX ADMIN — SODIUM CHLORIDE, PRESERVATIVE FREE 10 ML: 5 INJECTION INTRAVENOUS at 19:45

## 2024-03-25 RX ADMIN — GABAPENTIN 100 MG: 100 CAPSULE ORAL at 17:50

## 2024-03-25 RX ADMIN — DIAZEPAM 2 MG: 2 TABLET ORAL at 19:44

## 2024-03-25 RX ADMIN — FINASTERIDE 5 MG: 5 TABLET, FILM COATED ORAL at 08:22

## 2024-03-25 RX ADMIN — MIDODRINE HYDROCHLORIDE 5 MG: 5 TABLET ORAL at 17:50

## 2024-03-25 RX ADMIN — CITALOPRAM HYDROBROMIDE 10 MG: 20 TABLET ORAL at 19:44

## 2024-03-25 RX ADMIN — VANCOMYCIN HYDROCHLORIDE 1500 MG: 1.5 INJECTION, POWDER, LYOPHILIZED, FOR SOLUTION INTRAVENOUS at 14:45

## 2024-03-25 RX ADMIN — APIXABAN 5 MG: 5 TABLET, FILM COATED ORAL at 08:22

## 2024-03-25 RX ADMIN — METOPROLOL SUCCINATE 25 MG: 25 TABLET, FILM COATED, EXTENDED RELEASE ORAL at 19:44

## 2024-03-25 RX ADMIN — GABAPENTIN 100 MG: 100 CAPSULE ORAL at 08:22

## 2024-03-25 RX ADMIN — GABAPENTIN 100 MG: 100 CAPSULE ORAL at 14:45

## 2024-03-25 RX ADMIN — METOPROLOL SUCCINATE 25 MG: 25 TABLET, FILM COATED, EXTENDED RELEASE ORAL at 10:57

## 2024-03-25 RX ADMIN — APIXABAN 5 MG: 5 TABLET, FILM COATED ORAL at 00:10

## 2024-03-25 RX ADMIN — GABAPENTIN 100 MG: 100 CAPSULE ORAL at 00:10

## 2024-03-25 RX ADMIN — CEFTRIAXONE 1000 MG: 1 INJECTION, POWDER, FOR SOLUTION INTRAMUSCULAR; INTRAVENOUS at 08:37

## 2024-03-25 RX ADMIN — SODIUM CHLORIDE, PRESERVATIVE FREE 10 ML: 5 INJECTION INTRAVENOUS at 00:24

## 2024-03-25 RX ADMIN — APIXABAN 5 MG: 5 TABLET, FILM COATED ORAL at 19:44

## 2024-03-25 RX ADMIN — CITALOPRAM HYDROBROMIDE 10 MG: 20 TABLET ORAL at 08:21

## 2024-03-25 RX ADMIN — ATORVASTATIN CALCIUM 40 MG: 40 TABLET, FILM COATED ORAL at 19:44

## 2024-03-25 ASSESSMENT — PAIN SCALES - GENERAL: PAINLEVEL_OUTOF10: 0

## 2024-03-25 NOTE — CARE COORDINATION
ANITA Student spoke with pt about discharge plans. Cousin/caregiver Deanna at bedside. Pt has hx of dementia, was mostly alert. Pt lives with Deanna in 1-story apt. Level entry. Pt is on 3L O2 at home and has cane, WC, and walker; uses walker the most. Pt has grab bars in shower and around toilet. Pt has PCP that he has seen in the last month and insurance to help with healthcare cost. Pt has had [facility] in the past. Pt states that he is mostly independent, but needs assistance from Deanna.    PT/OT rec SNF. List provided. Pt adamant that he does not want to go. Deanna confirms that pt would \"throw a fit\" if he went to a SNF. ANITA Student called pt sister Poornima who agrees that home with [facility] is the discharge plan.       03/25/24 9584   Service Assessment   Cognition Dementia / Early Alzheimer's   History Provided By Patient;Child/Family  (Cousin and caregiver, Deanna)   Primary Caregiver Self   Accompanied By/Relationship Deanna Laughlin, cousin/caregiver   Support Systems Family Members   PCP Verified by CM Yes   Last Visit to PCP Within last 3 months   Prior Functional Level Assistance with the following:;Mobility   Current Functional Level Assistance with the following:;Mobility   Social/Functional History   Lives With Family   Type of Home Apartment   Home Layout One level   Home Access Level entry   Bathroom Equipment Grab bars around toilet;Grab bars in shower   Home Equipment Wheelchair-manual;Walker, rolling;Cane   Condition of Participation: Discharge Planning   The Patient and/or Patient Representative was provided with a Choice of Provider? Patient   The Patient and/Or Patient Representative agree with the Discharge Plan? Yes   Freedom of Choice list was provided with basic dialogue that supports the patient's individualized plan of care/goals, treatment preferences, and shares the quality data associated with the providers?  Yes

## 2024-03-25 NOTE — CARE COORDINATION
SW Student attempted to call pt dgtr April. Left VM asking her to call back.    Per previous CM notes, pt dgtr April is pt's legal guardian. However, there is no paperwork in chart confirming this. SW Student will discuss with pt dgtr when she calls back or this Student calls again.

## 2024-03-25 NOTE — CONSULTS
[facility] ACUTE CARE PHYSICAL THERAPY EVALUATION  Clau Bueno, 1950, 3006/3006-A, 3/25/2024    History  [place]:  The primary encounter diagnosis was Acute pyelonephritis. Diagnoses of Urinary obstruction and Shortness of breath were also pertinent to this visit.  Patient  has a past medical history of Arthritis, Former smoker, GERD (gastroesophageal reflux disease), H/O echocardiogram, History of nuclear stress test, Hx of fall, Hyperlipidemia, Hypertension, Obstructive sleep apnea, Pleural effusion, Shortness of breath, and Unspecified cerebral artery occlusion with cerebral infarction.  Patient  has a past surgical history that includes joint replacement; Colonoscopy; and Bunionectomy.    Discharge Recommendation: Encourage facility for moderate post-acute rehabilitation, anticipate 1-2 hours per day and 5 days per week.       Subjective:    Patient states:  \"I'm surviving\".      Pain:  pt denied having any pain.      Communication with other providers:  Handoff to RN, co-evaluation with OTRandy and OT studentAudi to maximize safety and for tolerance    Restrictions: general precautions, fall risk     Home Setup/Prior level of function  Social/Functional History  Lives With: Family  Type of Home: Condo  Home Layout: One level  Home Access: Level entry  Bathroom Shower/Tub: Tub/Shower unit  Bathroom Toilet: Standard  Bathroom Equipment: Built-in shower seat, Grab bars in shower, Grab bars around toilet  Home Equipment: Cane, Wheelchair-manual, Walker, rolling  Has the patient had two or more falls in the past year or any fall with injury in the past year?: Yes  ADL Assistance: Needs assistance  Homemaking Assistance: Needs assistance  Ambulation Assistance:  (Darleen with FWW/cane)  Transfer Assistance: Independent  Active : No  Occupation: Retired    Examination of body systems (includes body structures/functions, activity/participation limitations):  Observation:  pt found resting in

## 2024-03-25 NOTE — PLAN OF CARE
Problem: Discharge Planning  Goal: Discharge to home or other facility with appropriate resources  3/25/2024 0031 by Stanley Stevenson, RN  Outcome: Progressing     Problem: Skin/Tissue Integrity  Goal: Absence of new skin breakdown  Description: 1.  Monitor for areas of redness and/or skin breakdown  2.  Assess vascular access sites hourly  3.  Every 4-6 hours minimum:  Change oxygen saturation probe site  4.  Every 4-6 hours:  If on nasal continuous positive airway pressure, respiratory therapy assess nares and determine need for appliance change or resting period.  3/25/2024 0031 by Stanley Stevenson, RN  Outcome: Progressing     Problem: Safety - Adult  Goal: Free from fall injury  3/25/2024 0031 by Stanley Stevenson, RN  Outcome: Progressing

## 2024-03-25 NOTE — CARE COORDINATION
RN Maisha to  desk. States that cousin/caregiver brought POA paperwork stating that she is pt's POA. Chart to be updated shortly. States that caregiver says pt will want April removed from his emergency contact list; RN to confirm.

## 2024-03-26 VITALS
WEIGHT: 220.9 LBS | SYSTOLIC BLOOD PRESSURE: 120 MMHG | OXYGEN SATURATION: 96 % | HEIGHT: 72 IN | DIASTOLIC BLOOD PRESSURE: 69 MMHG | HEART RATE: 97 BPM | RESPIRATION RATE: 19 BRPM | TEMPERATURE: 99 F | BODY MASS INDEX: 29.92 KG/M2

## 2024-03-26 LAB
ANION GAP SERPL CALCULATED.3IONS-SCNC: 12 MMOL/L (ref 7–16)
BASOPHILS ABSOLUTE: 0 K/CU MM
BASOPHILS RELATIVE PERCENT: 0.1 % (ref 0–1)
BUN SERPL-MCNC: 19 MG/DL (ref 6–23)
CALCIUM SERPL-MCNC: 8.7 MG/DL (ref 8.3–10.6)
CHLORIDE BLD-SCNC: 102 MMOL/L (ref 99–110)
CO2: 27 MMOL/L (ref 21–32)
CREAT SERPL-MCNC: 0.9 MG/DL (ref 0.9–1.3)
CRP SERPL HS-MCNC: 187.4 MG/L
CULTURE: ABNORMAL
CULTURE: ABNORMAL
DIFFERENTIAL TYPE: ABNORMAL
DOSE AMOUNT: NORMAL
DOSE TIME: NORMAL
EOSINOPHILS ABSOLUTE: 0.1 K/CU MM
EOSINOPHILS RELATIVE PERCENT: 0.5 % (ref 0–3)
GFR SERPL CREATININE-BSD FRML MDRD: >90 ML/MIN/1.73M2
GLUCOSE SERPL-MCNC: 118 MG/DL (ref 70–99)
HCT VFR BLD CALC: 26.4 % (ref 42–52)
HEMOGLOBIN: 7.9 GM/DL (ref 13.5–18)
IMMATURE NEUTROPHIL %: 0.5 % (ref 0–0.43)
LYMPHOCYTES ABSOLUTE: 0.8 K/CU MM
LYMPHOCYTES RELATIVE PERCENT: 7.7 % (ref 24–44)
Lab: ABNORMAL
MCH RBC QN AUTO: 30.4 PG (ref 27–31)
MCHC RBC AUTO-ENTMCNC: 29.9 % (ref 32–36)
MCV RBC AUTO: 101.5 FL (ref 78–100)
MONOCYTES ABSOLUTE: 0.7 K/CU MM
MONOCYTES RELATIVE PERCENT: 6.6 % (ref 0–4)
NUCLEATED RBC %: 0 %
PDW BLD-RTO: 14.6 % (ref 11.7–14.9)
PLATELET # BLD: 167 K/CU MM (ref 140–440)
PMV BLD AUTO: 10.3 FL (ref 7.5–11.1)
POTASSIUM SERPL-SCNC: 3.7 MMOL/L (ref 3.5–5.1)
RBC # BLD: 2.6 M/CU MM (ref 4.6–6.2)
SEGMENTED NEUTROPHILS ABSOLUTE COUNT: 8.5 K/CU MM
SEGMENTED NEUTROPHILS RELATIVE PERCENT: 84.6 % (ref 36–66)
SODIUM BLD-SCNC: 141 MMOL/L (ref 135–145)
SPECIMEN: ABNORMAL
TOTAL IMMATURE NEUTOROPHIL: 0.05 K/CU MM
TOTAL NUCLEATED RBC: 0 K/CU MM
VANCOMYCIN RANDOM: 10.1 UG/ML
WBC # BLD: 10.1 K/CU MM (ref 4–10.5)

## 2024-03-26 PROCEDURE — 6370000000 HC RX 637 (ALT 250 FOR IP): Performed by: INTERNAL MEDICINE

## 2024-03-26 PROCEDURE — 94761 N-INVAS EAR/PLS OXIMETRY MLT: CPT

## 2024-03-26 PROCEDURE — 36415 COLL VENOUS BLD VENIPUNCTURE: CPT

## 2024-03-26 PROCEDURE — 80048 BASIC METABOLIC PNL TOTAL CA: CPT

## 2024-03-26 PROCEDURE — 6370000000 HC RX 637 (ALT 250 FOR IP): Performed by: PHYSICIAN ASSISTANT

## 2024-03-26 PROCEDURE — 6370000000 HC RX 637 (ALT 250 FOR IP): Performed by: FAMILY MEDICINE

## 2024-03-26 PROCEDURE — 2700000000 HC OXYGEN THERAPY PER DAY

## 2024-03-26 PROCEDURE — 2580000003 HC RX 258: Performed by: PHYSICIAN ASSISTANT

## 2024-03-26 PROCEDURE — 80202 ASSAY OF VANCOMYCIN: CPT

## 2024-03-26 PROCEDURE — 86140 C-REACTIVE PROTEIN: CPT

## 2024-03-26 PROCEDURE — 85025 COMPLETE CBC W/AUTO DIFF WBC: CPT

## 2024-03-26 PROCEDURE — 6360000002 HC RX W HCPCS: Performed by: INTERNAL MEDICINE

## 2024-03-26 PROCEDURE — 2580000003 HC RX 258: Performed by: INTERNAL MEDICINE

## 2024-03-26 RX ORDER — METOPROLOL SUCCINATE 50 MG/1
50 TABLET, EXTENDED RELEASE ORAL 2 TIMES DAILY
Qty: 30 TABLET | Refills: 3 | Status: SHIPPED | OUTPATIENT
Start: 2024-03-26

## 2024-03-26 RX ORDER — METOPROLOL SUCCINATE 50 MG/1
50 TABLET, EXTENDED RELEASE ORAL 2 TIMES DAILY
Status: DISCONTINUED | OUTPATIENT
Start: 2024-03-26 | End: 2024-03-26 | Stop reason: HOSPADM

## 2024-03-26 RX ORDER — MIDODRINE HYDROCHLORIDE 5 MG/1
10 TABLET ORAL
Status: DISCONTINUED | OUTPATIENT
Start: 2024-03-26 | End: 2024-03-26 | Stop reason: HOSPADM

## 2024-03-26 RX ORDER — CIPROFLOXACIN 500 MG/1
500 TABLET, FILM COATED ORAL EVERY 12 HOURS SCHEDULED
Status: DISCONTINUED | OUTPATIENT
Start: 2024-03-26 | End: 2024-03-26 | Stop reason: HOSPADM

## 2024-03-26 RX ORDER — CIPROFLOXACIN 500 MG/1
500 TABLET, FILM COATED ORAL EVERY 12 HOURS SCHEDULED
Qty: 28 TABLET | Refills: 0 | Status: SHIPPED | OUTPATIENT
Start: 2024-03-26 | End: 2024-04-09

## 2024-03-26 RX ORDER — MIDODRINE HYDROCHLORIDE 10 MG/1
10 TABLET ORAL
Qty: 90 TABLET | Refills: 3 | Status: SHIPPED | OUTPATIENT
Start: 2024-03-26

## 2024-03-26 RX ADMIN — DIAZEPAM 2 MG: 2 TABLET ORAL at 08:37

## 2024-03-26 RX ADMIN — MIDODRINE HYDROCHLORIDE 5 MG: 5 TABLET ORAL at 08:36

## 2024-03-26 RX ADMIN — FINASTERIDE 5 MG: 5 TABLET, FILM COATED ORAL at 08:37

## 2024-03-26 RX ADMIN — APIXABAN 5 MG: 5 TABLET, FILM COATED ORAL at 08:36

## 2024-03-26 RX ADMIN — GABAPENTIN 100 MG: 100 CAPSULE ORAL at 08:37

## 2024-03-26 RX ADMIN — SODIUM CHLORIDE, PRESERVATIVE FREE 10 ML: 5 INJECTION INTRAVENOUS at 08:37

## 2024-03-26 RX ADMIN — ASPIRIN 81 MG: 81 TABLET, CHEWABLE ORAL at 08:37

## 2024-03-26 RX ADMIN — MIDODRINE HYDROCHLORIDE 10 MG: 5 TABLET ORAL at 12:37

## 2024-03-26 RX ADMIN — CEFEPIME 2000 MG: 2 INJECTION, POWDER, FOR SOLUTION INTRAVENOUS at 04:32

## 2024-03-26 RX ADMIN — GABAPENTIN 100 MG: 100 CAPSULE ORAL at 12:37

## 2024-03-26 RX ADMIN — PANTOPRAZOLE SODIUM 40 MG: 40 TABLET, DELAYED RELEASE ORAL at 08:37

## 2024-03-26 RX ADMIN — HYDROXYZINE PAMOATE 50 MG: 25 CAPSULE ORAL at 00:05

## 2024-03-26 RX ADMIN — METOPROLOL SUCCINATE 25 MG: 25 TABLET, FILM COATED, EXTENDED RELEASE ORAL at 08:36

## 2024-03-26 RX ADMIN — CITALOPRAM HYDROBROMIDE 10 MG: 20 TABLET ORAL at 08:36

## 2024-03-26 ASSESSMENT — PAIN SCALES - GENERAL
PAINLEVEL_OUTOF10: 0

## 2024-03-26 NOTE — PLAN OF CARE
Problem: Discharge Planning  Goal: Discharge to home or other facility with appropriate resources  3/26/2024 1221 by Maisha Alvarez RN  Outcome: Progressing  3/26/2024 0227 by Kaylynn Hernández RN  Outcome: Progressing  Flowsheets (Taken 3/25/2024 1944)  Discharge to home or other facility with appropriate resources:   Identify barriers to discharge with patient and caregiver   Arrange for needed discharge resources and transportation as appropriate   Identify discharge learning needs (meds, wound care, etc)     Problem: Skin/Tissue Integrity  Goal: Absence of new skin breakdown  Description: 1.  Monitor for areas of redness and/or skin breakdown  2.  Assess vascular access sites hourly  3.  Every 4-6 hours minimum:  Change oxygen saturation probe site  4.  Every 4-6 hours:  If on nasal continuous positive airway pressure, respiratory therapy assess nares and determine need for appliance change or resting period.  3/26/2024 1221 by Maisha Alvarez RN  Outcome: Progressing  3/26/2024 0227 by Kaylynn Hernández RN  Outcome: Progressing     Problem: Safety - Adult  Goal: Free from fall injury  3/26/2024 1221 by Maisha Alvarez RN  Outcome: Progressing  3/26/2024 0227 by Kaylynn Hernández RN  Outcome: Progressing  Flowsheets (Taken 3/25/2024 1944)  Free From Fall Injury: Instruct family/caregiver on patient safety     Problem: Chronic Conditions and Co-morbidities  Goal: Patient's chronic conditions and co-morbidity symptoms are monitored and maintained or improved  Outcome: Progressing

## 2024-03-26 NOTE — DISCHARGE SUMMARY
40%. Left ventricle size is normal. Mildly increased wall thickness. Global hypokinesis present.   Right Ventricle: Right ventricle is dilated.   Mitral Valve: Mild to moderate regurgitation.   Left Atrium: Left atrium is moderately dilated.   Pericardium: Moderate (1.6 cm) localized pericardial effusion present around the left ventricle (no change from previous echo). No indication of cardiac tamponade.   Aortic Valve: Calcified and restriced left coronary cusp of the aortic valve with mild stenosis. AV area by continuity VTI is 1.6 cm2.   Patient is in atrial fibrillation     CT ABDOMEN PELVIS W IV CONTRAST Additional Contrast? None    Result Date: 3/24/2024  Reason: Urinary tract infection, fever possible pyelonephritis CT abdomen pelvis with contrast TECHNIQUE: Collimated helical images are made from the lower lungs through the pubic symphysis after 75 mL of Isovue-370 intravenous contrast. Up-to-date CT equipment and radiation dose reduction techniques were employed. COMPARISON: July 30, 2020 FINDINGS: Small amount of pericardial fluid identified, measuring 2.3 cm posteriorly, this is increased from prior examination. Small bilateral pleural effusions are also identified. Bilateral hydronephrosis identified which is new from prior examination with streakiness identified in the perirenal fat bilaterally. Gallbladder is nondistended. Liver, adrenals unremarkable. Pancreas unremarkable. Findings CT the pelvis: The urinary bladder is markedly distended. The prostate measures 3 cm in AP dimension.     1. Likely passive bilateral hydronephrosis with marked distention of the bladder. Streakiness does surround the perirenal fat bilaterally for which I cannot exclude pyelonephritis. Recommend urinalysis after Garrett catheter placed. 2. Interval increased small pericardial effusion, small bilateral pleural effusions. 3. Cardiac megaly 4. Small sliding-type hiatal hernia Electronically signed by MD Adam Marcus    XR CHEST

## 2024-03-26 NOTE — CARE COORDINATION
9:43 ARTHUR Helm gave ok for [facility] from Dr Navas's office.

## 2024-03-26 NOTE — PLAN OF CARE
Problem: Discharge Planning  Goal: Discharge to home or other facility with appropriate resources  3/26/2024 1425 by Maisha Alvarez RN  Outcome: Adequate for Discharge  3/26/2024 1221 by Maisha Alvarez RN  Outcome: Progressing  3/26/2024 0227 by Kaylynn Hernández RN  Outcome: Progressing  Flowsheets (Taken 3/25/2024 1944)  Discharge to home or other facility with appropriate resources:   Identify barriers to discharge with patient and caregiver   Arrange for needed discharge resources and transportation as appropriate   Identify discharge learning needs (meds, wound care, etc)     Problem: Skin/Tissue Integrity  Goal: Absence of new skin breakdown  Description: 1.  Monitor for areas of redness and/or skin breakdown  2.  Assess vascular access sites hourly  3.  Every 4-6 hours minimum:  Change oxygen saturation probe site  4.  Every 4-6 hours:  If on nasal continuous positive airway pressure, respiratory therapy assess nares and determine need for appliance change or resting period.  3/26/2024 1425 by Maisha Alvarez RN  Outcome: Adequate for Discharge  3/26/2024 1221 by Maisha Alvarez RN  Outcome: Progressing  3/26/2024 0227 by Kaylnyn Hernández RN  Outcome: Progressing     Problem: Safety - Adult  Goal: Free from fall injury  3/26/2024 1425 by Maisha Alvarez RN  Outcome: Adequate for Discharge  3/26/2024 1221 by Maisha Alvarez RN  Outcome: Progressing  3/26/2024 0227 by Kaylynn Hernández RN  Outcome: Progressing  Flowsheets (Taken 3/25/2024 1944)  Free From Fall Injury: Instruct family/caregiver on patient safety     Problem: Chronic Conditions and Co-morbidities  Goal: Patient's chronic conditions and co-morbidity symptoms are monitored and maintained or improved  3/26/2024 1425 by Maisha Alvarez RN  Outcome: Adequate for Discharge  3/26/2024 1221 by Maisha Alvarez RN  Outcome: Progressing

## 2024-03-26 NOTE — PLAN OF CARE
Problem: Discharge Planning  Goal: Discharge to home or other facility with appropriate resources  Outcome: Progressing  Flowsheets (Taken 3/25/2024 1944)  Discharge to home or other facility with appropriate resources:   Identify barriers to discharge with patient and caregiver   Arrange for needed discharge resources and transportation as appropriate   Identify discharge learning needs (meds, wound care, etc)     Problem: Skin/Tissue Integrity  Goal: Absence of new skin breakdown  Description: 1.  Monitor for areas of redness and/or skin breakdown  2.  Assess vascular access sites hourly  3.  Every 4-6 hours minimum:  Change oxygen saturation probe site  4.  Every 4-6 hours:  If on nasal continuous positive airway pressure, respiratory therapy assess nares and determine need for appliance change or resting period.  Outcome: Progressing     Problem: Safety - Adult  Goal: Free from fall injury  Outcome: Progressing  Flowsheets (Taken 3/25/2024 1944)  Free From Fall Injury: Instruct family/caregiver on patient safety

## 2024-03-26 NOTE — CARE COORDINATION
Pt is on discharge for home with [facility].  LSW informed Marilyn with HC of discharge and she will initiated HC services.

## 2024-03-26 NOTE — PROGRESS NOTES
[address] [address]   Progress Note  [facility] 0 1 2      Date: 3/26/2024   Patient: Clau Bueno   : 1950   DOA: 3/24/2024   MRN: [id number]   ROOM#: 3006/3006-A     Admit Date: 3/24/2024     Collaborating Urologist on Call at time of admission: Dr. Riley   CC: Fever/chills and weakness/fatigue   Reason for Consult: Urine retention, ?Urosepsis     Subjective:     Pain: mild, no nausea and no vomiting,   Bowel Movement/Flatus: Yes  Voiding: Indwelling catheter with clear yellow urine    Pt resting in bed, reports feeling better today.    Objective:    Vitals:   /76   Pulse (!) 102   Temp 98.6 °F (37 °C) (Oral)   Resp 14   Ht 1.829 m (6')   Wt 100.2 kg (220 lb 14.4 oz)   SpO2 97%   BMI 29.96 kg/m²   Temp  Av.3 °F (36.8 °C)  Min: 98 °F (36.7 °C)  Max: 98.6 °F (37 °C)    Intake/Output Summary (Last 24 hours) at 3/26/2024 1015  Last data filed at 3/26/2024 0837  Gross per 24 hour   Intake 632.2 ml   Output 2450 ml   Net -1817.8 ml         Physical Exam:  Gen:    Pleasant male, in NAD  Neuro: Non-focal  Resp:  Unlabored breathing  Abd:    Soft, non-distended, non-tender to palpation, no rebound  Back:   No CVAT  :      Garrett catheter in place draining clear yellow urine  Ext:     No edema of bilateral LEs    Labs:  WBC:    Lab Results   Component Value Date/Time    WBC 10.1 2024 03:46 AM     Hemoglobin/Hematocrit:    Lab Results   Component Value Date/Time    HGB 7.9 2024 03:46 AM    HCT 26.4 2024 03:46 AM     BMP:   Lab Results   Component Value Date/Time     2024 03:46 AM    K 3.7 2024 03:46 AM     2024 03:46 AM    CO2 27 2024 03:46 AM    BUN 19 2024 03:46 AM    LABALBU 3.5 2024 01:25 AM    CREATININE 0.9 2024 03:46 AM    CALCIUM 8.7 2024 03:46 AM    GFRAA >60 2020 11:05 AM    LABGLOM >90 2024 03:46 AM     Blood Culture: NGTD  Urine Culture: Susceptibility  Pseudomonas 
   03/26/24 1513   Encounter Summary   Encounter Overview/Reason  Initial Encounter   Service Provided For: Patient and family together   Referral/Consult From: [facility]   Support System Family members   Last Encounter  03/26/24  (PT feeling better, he is ready to go home, he has support, he received good care while here, he wanted prayer.)   Complexity of Encounter Low   Begin Time 1506   End Time  1514   Total Time Calculated 8 min   Spiritual/Emotional needs   Type Spiritual Support   Grief, Loss, and Adjustments   Type Adjustment to illness   Assessment/Intervention/Outcome   Assessment Calm;Coping;Hopeful   Intervention Active listening;Discussed illness injury and it’s impact;Discussed belief system/Church practices/rebeca;Prayer (assurance of)/[place];Sustaining Presence/Ministry of presence   Outcome Comfort;Acceptance;Coping;Encouraged;Expressed Gratitude;Optimistic   Plan and Referrals   Plan/Referrals Continue Support (comment)       
4 Eyes Skin Assessment     NAME:  Clua Bueno  YOB: 1950  MEDICAL RECORD NUMBER:  [id number]    The patient is being assessed for  Admission    I agree that at least one RN has performed a thorough Head to Toe Skin Assessment on the patient. ALL assessment sites listed below have been assessed.      Areas assessed by both nurses:    Head, Face, Ears, Shoulders, Back, Chest, Arms, Elbows, Hands, Sacrum. Buttock, Coccyx, Ischium, Legs. Feet and Heels, and Under Medical Devices         Does the Patient have a Wound? No noted wound(s)       Prudencio Prevention initiated by RN: Yes  Wound Care Orders initiated by RN: No    Pressure Injury (Stage 3,4, Unstageable, DTI, NWPT, and Complex wounds) if present, place Wound referral order by RN under : No    New Ostomies, if present place, Ostomy referral order under : No     Nurse 1 eSignature: Electronically signed by LOGAN JOHN RN on 3/24/24 at 2:33 PM EDT    **SHARE this note so that the co-signing nurse can place an eSignature**    Nurse 2 eSignature: Electronically signed by Justin Padilla RN on 3/24/24 at 2:34 PM EDT   
Occupational Therapy  [facility] ACUTE CARE OCCUPATIONAL THERAPY EVALUATION    Clau Bueno, 1950, 3006/3006-A, 3/25/2024    Discharge Recommendation: Patient requires minimal to moderate OT services at discharge, typically 1-2 hours/day, 5 days/week    History:  Apache:  The primary encounter diagnosis was Acute pyelonephritis. Diagnoses of Urinary obstruction and Shortness of breath were also pertinent to this visit.    Subjective:  Patient states: \"My legs just feel so weak.\"  Pain: Pt reported mild Rt UE pain but did not rate this date  Communication with other providers: PT Amor, RN Maisha  Restrictions: General Precautions, Fall Risk, 2 L o2, Telemetry, Pulse Ox, BP Cuff, Bed Alarm    Home Setup/Prior level of function:  Social/Functional History  Lives With: Family (cousin)  Type of Home: Condo  Home Layout: One level  Home Access: Level entry  Bathroom Shower/Tub: Tub/Shower unit  Bathroom Toilet: Standard  Bathroom Equipment: Built-in shower seat, Grab bars in shower, Grab bars around toilet  Home Equipment: Cane, Wheelchair-manual, Walker, rolling  Has the patient had two or more falls in the past year or any fall with injury in the past year?: Yes  ADL Assistance: Needs assistance (cousin helps with all ADLs)  Homemaking Assistance: Needs assistance (cousin does all cooking, cleaning, laundry)  Ambulation Assistance: Independent  Transfer Assistance: Independent  Active : No (cousin drives)  Occupation: Retired    Examination:  Observation: Supine in bed upon arrival with sitter present. Pleasant and agreeable to OT evaluation  Vision: Impaired (pt reporting difficulty with seeing this date, sitter reported difficulty with seeing food/utensil on food tray)  Hearing: WFL  Vitals: Pt endorsed feeling dizzy with mobility this date, BP found to be 102/49 sitting EOB after mobility    Body Systems and functions:  ROM: WFL in all joints BL UE  Strength: 3+/5 in Rt shoulder flexors 
PHARMACY VANCOMYCIN MONITORING SERVICE  Pharmacy consulted by JACQUELINE Cain for monitoring and adjustment.    Indication for treatment: Vancomycin indication: Other: UTI history of enterococcus  Goal trough: Trough Goal: 10-15 mcg/mL  AUC/SOLEDAD: <500    Risk Factors for MRSA Identified:   History of Enteroccus    Pertinent Laboratory Values:   Temp Readings from Last 3 Encounters:   03/24/24 100.3 °F (37.9 °C) (Oral)   03/10/24 98 °F (36.7 °C) (Oral)   03/05/24 96.9 °F (36.1 °C)     Recent Labs     03/24/24  0650   WBC 9.2   LACTATE 1.3     Recent Labs     03/24/24  0650   BUN 26*   CREATININE 1.3     Estimated Creatinine Clearance: 63 mL/min (based on SCr of 1.3 mg/dL).  No intake or output data in the 24 hours ending 03/24/24 1158  Last Encounter Weight:  Wt Readings from Last 3 Encounters:   03/24/24 100.2 kg (221 lb)   03/10/24 96.2 kg (212 lb)   03/05/24 96.2 kg (212 lb)       No intake/output data recorded.     Pertinent Cultures:   Date    Source    Results  03/24   Blood    Sent    Vancomycin level:   TROUGH:  No results for input(s): \"VANCOTROUGH\" in the last 72 hours.  RANDOM:  No results for input(s): \"VANCORANDOM\" in the last 72 hours.    Assessment:  HPI: fede Bueno is a 73 y.o. male history of stroke, dementia, obstructive sleep apnea, hypertension that presents to emergency department complaints of acute chills. Vancomycin is started for UTI.  Interval History: New Start 3/24/2024  SCr, BUN, and urine output:   Scr 1.3 mg/dL, baseline ~ 1? Only one level recently.  BUN 26  No Urine Output data  Day(s) of therapy: 1 / 7  Vancomycin concentration: TBD    Plan:  Will give a loading dose of 2000 mg followed by 1500 mg IV q24 hours. This dose predicts a ssAUC of 443 mg/L*hr and ssTrough of 11.4 mg/L.  Plan for a level in 2 days.  Pharmacy will continue to monitor patient and adjust therapy as indicated    VANCOMYCIN CONCENTRATION SCHEDULED FOR 03/26 @ 0600    Thank you for the consult.  Qing Almazan 
PHARMACY VANCOMYCIN MONITORING SERVICE  Pharmacy consulted by JACQUELINE Cain for monitoring and adjustment.    Indication for treatment: Vancomycin indication: Other: UTI history of enterococcus  Goal trough: Trough Goal: 10-15 mcg/mL  AUC/SOLEDAD: <500    Risk Factors for MRSA Identified:   History of Enteroccus    Pertinent Laboratory Values:   Temp Readings from Last 3 Encounters:   03/25/24 98.3 °F (36.8 °C) (Oral)   03/10/24 98 °F (36.7 °C) (Oral)   03/05/24 96.9 °F (36.1 °C)     Recent Labs     03/24/24  0650 03/25/24  0125   WBC 9.2 15.1*   LACTATE 1.3  --        Recent Labs     03/24/24  0650 03/25/24  0125   BUN 26* 25*   CREATININE 1.3 1.2       Estimated Creatinine Clearance: 68 mL/min (based on SCr of 1.2 mg/dL).    Intake/Output Summary (Last 24 hours) at 3/25/2024 1508  Last data filed at 3/25/2024 0925  Gross per 24 hour   Intake 730 ml   Output 1250 ml   Net -520 ml     Last Encounter Weight:  Wt Readings from Last 3 Encounters:   03/25/24 100.2 kg (220 lb 14.4 oz)   03/10/24 96.2 kg (212 lb)   03/05/24 96.2 kg (212 lb)       In: 730   Out: 3250 [Urine:3250]     Pertinent Cultures:   Date    Source    Results  03/24   Blood    No growth  3/24                            Urine                                       Pseudomonas    Vancomycin level:   TROUGH:  No results for input(s): \"VANCOTROUGH\" in the last 72 hours.  RANDOM:  No results for input(s): \"VANCORANDOM\" in the last 72 hours.    Assessment:  HPI: fede Bueno is a 73 y.o. male history of stroke, dementia, obstructive sleep apnea, hypertension that presents to emergency department complaints of acute chills. Vancomycin is started for UTI.  Interval History: New Start 3/25/2024  SCr, BUN, and urine output:   Scr down slightly to 1.2 (baseline ~ 1)  BUN slightly elevated @25, trending down  UOP good  Day(s) of therapy: 2 / 7  Vancomycin concentration: TBD    Plan:  Renal trends just above baseline.  Continue vancomycin 1500 mg IV q24 hours. This 
Physical Therapy  Attempted to see pt, pt did not want to get out of the bed or do ex day. Will cont. Maya Ceron PTA      
Pts horta was switched to a leg bag for discharge. All paperwork reviewed with pt and family member. All questions answered at this time. PIV removed from L FA. Pt escorted to front entrance for discharge.          Maisha Alvarez RN   
dependent on accuracy of patient problem list and past encounter diagnosis.     PAST MEDICAL HISTORY:  Obtained from chart and family member includes hypertension, hyperlipidemia, obstructive sleep apnea, atrial fibrillation, GERD, CVA, coronary artery disease, last catheterization in December 2022.     PAST SURGICAL HISTORY:  Joint replacement, colonoscopy.     SOCIAL HISTORY:  Former smoker.  No current alcohol use.     ALLERGIES:  NO KNOWN ALLERGIES.        HOME MEDICATIONS:  Tylenol, albuterol, Eliquis, aspirin, Lipitor, Celexa, Valium, Nexium, Proscar, Lasix, Neurontin, metoprolol, Lyrica, tramadol, and tiotropium/olodaterol.    Objective:   /62   Pulse (!) 103   Temp 98.3 °F (36.8 °C) (Oral)   Resp 25   Ht 1.829 m (6')   Wt 100.2 kg (220 lb 14.4 oz)   SpO2 98%   BMI 29.96 kg/m²     Intake/Output Summary (Last 24 hours) at 3/25/2024 0852  Last data filed at 3/25/2024 0823  Gross per 24 hour   Intake 370 ml   Output 3250 ml   Net -2880 ml       Medications:   Scheduled Meds:   apixaban  5 mg Oral BID    aspirin  81 mg Oral Daily    atorvastatin  40 mg Oral Nightly    citalopram  10 mg Oral BID    pantoprazole  40 mg Oral QAM AC    finasteride  5 mg Oral Daily    gabapentin  100 mg Oral 4x Daily    sodium chloride flush  5-40 mL IntraVENous 2 times per day    vancomycin  1,500 mg IntraVENous Q24H    cefTRIAXone (ROCEPHIN) IV  1,000 mg IntraVENous Q24H    [Held by provider] metoprolol succinate  50 mg Oral Daily      Infusions:   sodium chloride        PRN Meds:  albuterol sulfate HFA, diazePAM, sodium chloride flush, sodium chloride, acetaminophen **OR** acetaminophen     Physical Exam:  Vitals:    03/25/24 0800   BP:    Pulse: (!) 103   Resp: 25   Temp: 98.3 °F (36.8 °C)   SpO2: 98%        General: AAO, NAD  Chest: Nontender  Cardiac: First and Second Heart Sounds are Normal, No Murmurs or Gallops noted  Lungs:Clear to auscultation and percussion.  Abdomen: Soft, NT, ND, +BS  Extremities: No 
respiratory failure with hypoxia  -On 3L NC at baseline, no increasing oxygen requirements on admission     HTN   -Holding home BP meds due to soft BP     Atrial fibrillation  -Currently in A-fib, heart rate 100s  -Cardio resume BB at reduced dose with hold parameters  - continue Eliquis      History of CVA   - continue Eliquis, ASA     History of dementia with behavioral disturbance  -Alert and oriented x 1-2 at baseline  -History of sundowning per family  -Continue home as needed Valium  -Delirium precautions, sitter as needed  - consider psych consult for medication recommendations      GABRIEL  - noncompliant with CPAP     Chronic pain   - per OARRs, patient is on gabapentin and Lyrica? Reordered gabapentin for now.      Recent epistaxis   - admitted at [facility] [place] s/p rhino rocket which was removed with no further issues, Eliquis was resumed on discharge from [facility] [place]     Code status   - per discussion with patient's cousins at bedside who are POA  as patient is confused - patient has DNR order, no CPR or intubation. OK for cardioversion and resuscitative medications.  Limited code order placed.    This patient was discussed with Dr. Uriarte. He was agreeable with the assessment and plan as dictated above.     Current Living situation: home with cousins  Expected Disposition: likely same  Estimated D/C: 2-3 days    Diet ADULT DIET; Regular   DVT Prophylaxis [] Lovenox, []  Heparin, [] SCDs, [] Ambulation,  [x] Eliquis, [] Xarelto []Coumadin   Code Status Limited   Disposition Patient requires continued admission due to UTI   Surrogate Decision Maker/ POA Deanna Laughlin     Personally reviewed Lab Studies and Imaging     Drugs that require monitoring for toxicity include Eliquis and the method of monitoring was daily CBC.    Subjective:     Chief Complaint: Fever and Headache       Patient seen and examined at bedside.  Required sitter overnight.  Denies acute complaints and appears he feels better this morning.  Pleasantly 
1,500 mg IntraVENous Q24H      Infusions:   sodium chloride        PRN Meds:  albuterol sulfate HFA, diazePAM, sodium chloride flush, sodium chloride, acetaminophen **OR** acetaminophen     Physical Exam:  Vitals:    03/26/24 0722   BP:    Pulse: (!) 102   Resp: 14   Temp:    SpO2: 97%        General: AAO, NAD  Chest: Nontender  Cardiac: First and Second Heart Sounds are Normal, No Murmurs or Gallops noted  Lungs:Clear to auscultation and percussion.  Abdomen: Soft, NT, ND, +BS  Extremities: No clubbing, no edema  Vascular:  Equal 2+ peripheral pulses.    Lab Data:  CBC:   Recent Labs     03/24/24  0650 03/25/24  0125 03/26/24  0346   WBC 9.2 15.1* 10.1   HGB 9.6* 8.6* 7.9*   HCT 32.0* 29.4* 26.4*   .9* 103.2* 101.5*    168 167     BMP:   Recent Labs     03/24/24  0650 03/25/24  0125 03/26/24  0346    142 141   K 4.5 4.1 3.7    103 102   CO2 30 25 27   BUN 26* 25* 19   CREATININE 1.3 1.2 0.9     LIVER PROFILE:   Recent Labs     03/24/24  0650 03/25/24  0125   AST 13* 15   ALT 11 11   BILITOT 0.7 0.9   ALKPHOS 120 102     PT/INR: No results for input(s): \"PROTIME\", \"INR\" in the last 72 hours.  APTT: No results for input(s): \"APTT\" in the last 72 hours.  BNP:  No results for input(s): \"BNP\" in the last 72 hours.      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 
presented with fever/chills and weakness admitted 3/24/2024 for suspected pyelonephritis.     1) Suspected Pyelonephritis: CT a/p 3/24/24 revealed bilateral perirenal fat stranding. Urine retention likely contributing factor.              WBC 15.1, afebrile this morning but T-max of 104.2 yesterday afternoon.              UA showed large leuks; urine cx pending              On IV Rocephin and Vancomycin  2) Urine retention: Markedly distended bladder on CT, Garrett now in place returning unknown amount of urine              History of enlarged prostate reported by patient's wife, on Proscar              Could consider Flomax but patient appears to be a fall risk and is on Eliquis.   Continue catheter.    Will follow.  Patient seen and examined, chart reviewed.     Electronically signed by Avinash Saldana PA-C on 3/25/2024 at 10:30 AM

## 2024-03-26 NOTE — CARE COORDINATION
LSW made referral to Marilyn with [facility].  CM will need a inpt HC order from the doctor on day of discharge.

## 2024-03-26 NOTE — DISCHARGE INSTRUCTIONS
Please complete antibiotics Cipro 500 mg twice a day for 2 weeks, decrease Metoprolol to 50 mg twice a day, continue eliqus 5 mg twice a day and aspirin, follow up with urology in their office in 7 to 10 days. Follow up with your cardiology in 1-2 weeks.

## 2024-03-29 LAB
CULTURE: NORMAL
CULTURE: NORMAL
Lab: NORMAL
Lab: NORMAL
SPECIMEN: NORMAL
SPECIMEN: NORMAL

## 2024-04-12 ENCOUNTER — HOSPITAL ENCOUNTER (OUTPATIENT)
Dept: CT IMAGING | Age: 74
Discharge: HOME | End: 2024-04-12
Attending: INTERNAL MEDICINE
Payer: MEDICARE

## 2024-04-12 ENCOUNTER — HOSPITAL ENCOUNTER (OUTPATIENT)
Age: 74
Discharge: HOME | End: 2024-04-12
Attending: INTERNAL MEDICINE
Payer: MEDICARE

## 2024-04-12 DIAGNOSIS — Z87.891 FORMER CIGARETTE SMOKER: ICD-10-CM

## 2024-04-12 LAB
ANION GAP SERPL CALCULATED.3IONS-SCNC: 14 MMOL/L (ref 7–16)
BUN SERPL-MCNC: 35 MG/DL (ref 6–23)
CALCIUM SERPL-MCNC: 8.9 MG/DL (ref 8.3–10.6)
CHLORIDE BLD-SCNC: 102 MMOL/L (ref 99–110)
CO2: 27 MMOL/L (ref 21–32)
CREAT SERPL-MCNC: 1.7 MG/DL (ref 0.9–1.3)
GFR SERPL CREATININE-BSD FRML MDRD: 42 ML/MIN/1.73M2
GLUCOSE SERPL-MCNC: 93 MG/DL (ref 70–99)
HCT VFR BLD CALC: 32 % (ref 42–52)
HEMOGLOBIN: 9.1 GM/DL (ref 13.5–18)
MCH RBC QN AUTO: 28.2 PG (ref 27–31)
MCHC RBC AUTO-ENTMCNC: 28.4 % (ref 32–36)
MCV RBC AUTO: 99.1 FL (ref 78–100)
PDW BLD-RTO: 15.9 % (ref 11.7–14.9)
PLATELET # BLD: 174 K/CU MM (ref 140–440)
PMV BLD AUTO: 11.1 FL (ref 7.5–11.1)
POTASSIUM SERPL-SCNC: 5 MMOL/L (ref 3.5–5.1)
PRO-BNP: ABNORMAL PG/ML
RBC # BLD: 3.23 M/CU MM (ref 4.6–6.2)
SODIUM BLD-SCNC: 143 MMOL/L (ref 135–145)
WBC # BLD: 5.9 K/CU MM (ref 4–10.5)

## 2024-04-12 PROCEDURE — 83880 ASSAY OF NATRIURETIC PEPTIDE: CPT

## 2024-04-12 PROCEDURE — 80048 BASIC METABOLIC PNL TOTAL CA: CPT

## 2024-04-12 PROCEDURE — 36415 COLL VENOUS BLD VENIPUNCTURE: CPT

## 2024-04-12 PROCEDURE — 71271 CT THORAX LUNG CANCER SCR C-: CPT

## 2024-04-12 PROCEDURE — 85027 COMPLETE CBC AUTOMATED: CPT

## 2024-04-23 ENCOUNTER — APPOINTMENT (OUTPATIENT)
Dept: CT IMAGING | Age: 74
End: 2024-04-23
Payer: MEDICARE

## 2024-04-23 ENCOUNTER — APPOINTMENT (OUTPATIENT)
Dept: GENERAL RADIOLOGY | Age: 74
End: 2024-04-23
Payer: MEDICARE

## 2024-04-23 ENCOUNTER — HOSPITAL ENCOUNTER (INPATIENT)
Age: 74
LOS: 3 days | Discharge: OTHER INSTITUTION | End: 2024-04-26
Attending: STUDENT IN AN ORGANIZED HEALTH CARE EDUCATION/TRAINING PROGRAM | Admitting: STUDENT IN AN ORGANIZED HEALTH CARE EDUCATION/TRAINING PROGRAM
Payer: MEDICARE

## 2024-04-23 DIAGNOSIS — E87.5 HYPERKALEMIA: ICD-10-CM

## 2024-04-23 DIAGNOSIS — R41.0 DELIRIUM: Primary | ICD-10-CM

## 2024-04-23 DIAGNOSIS — J96.02 ACUTE RESPIRATORY FAILURE WITH HYPOXIA AND HYPERCAPNIA (HCC): ICD-10-CM

## 2024-04-23 DIAGNOSIS — D64.9 ANEMIA, UNSPECIFIED TYPE: ICD-10-CM

## 2024-04-23 DIAGNOSIS — J44.1 COPD EXACERBATION (HCC): ICD-10-CM

## 2024-04-23 DIAGNOSIS — N39.0 URINARY TRACT INFECTION WITHOUT HEMATURIA, SITE UNSPECIFIED: ICD-10-CM

## 2024-04-23 DIAGNOSIS — J96.01 ACUTE RESPIRATORY FAILURE WITH HYPOXIA AND HYPERCAPNIA (HCC): ICD-10-CM

## 2024-04-23 DIAGNOSIS — N17.9 AKI (ACUTE KIDNEY INJURY) (HCC): ICD-10-CM

## 2024-04-23 DIAGNOSIS — R33.8 ACUTE URINARY RETENTION: ICD-10-CM

## 2024-04-23 PROBLEM — G93.41 ACUTE METABOLIC ENCEPHALOPATHY: Status: ACTIVE | Noted: 2024-04-23

## 2024-04-23 LAB
ALBUMIN SERPL-MCNC: 3.7 GM/DL (ref 3.4–5)
ALP BLD-CCNC: 127 IU/L (ref 40–129)
ALT SERPL-CCNC: 19 U/L (ref 10–40)
ANION GAP SERPL CALCULATED.3IONS-SCNC: 13 MMOL/L (ref 7–16)
AST SERPL-CCNC: 32 IU/L (ref 15–37)
BACTERIA: ABNORMAL /HPF
BASE EXCESS MIXED: 1.2 (ref 0–3)
BASE EXCESS MIXED: 2.3 (ref 0–3)
BASOPHILS ABSOLUTE: 0 K/CU MM
BASOPHILS RELATIVE PERCENT: 0.4 % (ref 0–1)
BILIRUB SERPL-MCNC: 0.6 MG/DL (ref 0–1)
BILIRUBIN URINE: NEGATIVE MG/DL
BLOOD, URINE: ABNORMAL
BUN SERPL-MCNC: 67 MG/DL (ref 6–23)
CALCIUM SERPL-MCNC: 8.5 MG/DL (ref 8.3–10.6)
CHLORIDE BLD-SCNC: 98 MMOL/L (ref 99–110)
CLARITY: CLEAR
CO2: 27 MMOL/L (ref 21–32)
COLOR: YELLOW
COMMENT: ABNORMAL
COMMENT: ABNORMAL
CREAT SERPL-MCNC: 2.9 MG/DL (ref 0.9–1.3)
DIFFERENTIAL TYPE: ABNORMAL
EOSINOPHILS ABSOLUTE: 0.1 K/CU MM
EOSINOPHILS RELATIVE PERCENT: 2.2 % (ref 0–3)
GFR SERPL CREATININE-BSD FRML MDRD: 22 ML/MIN/1.73M2
GLUCOSE SERPL-MCNC: 136 MG/DL (ref 70–99)
GLUCOSE, URINE: NEGATIVE MG/DL
HCO3 VENOUS: 29 MMOL/L (ref 22–29)
HCO3 VENOUS: 31.4 MMOL/L (ref 22–29)
HCT VFR BLD CALC: 32.8 % (ref 42–52)
HEMOGLOBIN: 9.3 GM/DL (ref 13.5–18)
HYALINE CASTS: >20 /LPF
IMMATURE NEUTROPHIL %: 0.2 % (ref 0–0.43)
KETONES, URINE: NEGATIVE MG/DL
LEUKOCYTE ESTERASE, URINE: ABNORMAL
LYMPHOCYTES ABSOLUTE: 1.1 K/CU MM
LYMPHOCYTES RELATIVE PERCENT: 20.1 % (ref 24–44)
MCH RBC QN AUTO: 27.3 PG (ref 27–31)
MCHC RBC AUTO-ENTMCNC: 28.4 % (ref 32–36)
MCV RBC AUTO: 96.2 FL (ref 78–100)
MONOCYTES ABSOLUTE: 0.6 K/CU MM
MONOCYTES RELATIVE PERCENT: 11.5 % (ref 0–4)
NEUTROPHILS RELATIVE PERCENT: 65.6 % (ref 36–66)
NITRITE URINE, QUANTITATIVE: NEGATIVE
NUCLEATED RBC %: 0.4 %
O2 SAT, VEN: 62.2 % (ref 50–70)
O2 SAT, VEN: 93.3 % (ref 50–70)
PCO2, VEN: 59 MMHG (ref 41–51)
PCO2, VEN: 70 MMHG (ref 41–51)
PDW BLD-RTO: 16.5 % (ref 11.7–14.9)
PH VENOUS: 7.26 (ref 7.32–7.43)
PH VENOUS: 7.3 (ref 7.32–7.43)
PH, URINE: 5 (ref 5–8)
PLATELET # BLD: 111 K/CU MM (ref 140–440)
PMV BLD AUTO: 11.7 FL (ref 7.5–11.1)
PO2, VEN: 159 MMHG (ref 28–48)
PO2, VEN: 39 MMHG (ref 28–48)
POTASSIUM SERPL-SCNC: 5.4 MMOL/L (ref 3.5–5.1)
PROTEIN UA: NEGATIVE MG/DL
RBC # BLD: 3.41 M/CU MM (ref 4.6–6.2)
RBC URINE: 58 /HPF (ref 0–3)
SEGMENTED NEUTROPHILS ABSOLUTE COUNT: 3.7 K/CU MM
SODIUM BLD-SCNC: 138 MMOL/L (ref 135–145)
SPECIFIC GRAVITY UA: 1.01 (ref 1–1.03)
TOTAL IMMATURE NEUTOROPHIL: 0.01 K/CU MM
TOTAL NUCLEATED RBC: 0 K/CU MM
TOTAL PROTEIN: 7.4 GM/DL (ref 6.4–8.2)
TRICHOMONAS: ABNORMAL /HPF
UROBILINOGEN, URINE: 0.2 MG/DL (ref 0.2–1)
WBC # BLD: 5.6 K/CU MM (ref 4–10.5)
WBC UA: 666 /HPF (ref 0–2)
YEAST: ABNORMAL /HPF

## 2024-04-23 PROCEDURE — 70450 CT HEAD/BRAIN W/O DYE: CPT

## 2024-04-23 PROCEDURE — 87086 URINE CULTURE/COLONY COUNT: CPT

## 2024-04-23 PROCEDURE — 2140000000 HC CCU INTERMEDIATE R&B

## 2024-04-23 PROCEDURE — 99285 EMERGENCY DEPT VISIT HI MDM: CPT

## 2024-04-23 PROCEDURE — 6360000002 HC RX W HCPCS: Performed by: STUDENT IN AN ORGANIZED HEALTH CARE EDUCATION/TRAINING PROGRAM

## 2024-04-23 PROCEDURE — 72125 CT NECK SPINE W/O DYE: CPT

## 2024-04-23 PROCEDURE — 94640 AIRWAY INHALATION TREATMENT: CPT

## 2024-04-23 PROCEDURE — 85025 COMPLETE CBC W/AUTO DIFF WBC: CPT

## 2024-04-23 PROCEDURE — 51702 INSERT TEMP BLADDER CATH: CPT

## 2024-04-23 PROCEDURE — 96375 TX/PRO/DX INJ NEW DRUG ADDON: CPT

## 2024-04-23 PROCEDURE — 5A09357 ASSISTANCE WITH RESPIRATORY VENTILATION, LESS THAN 24 CONSECUTIVE HOURS, CONTINUOUS POSITIVE AIRWAY PRESSURE: ICD-10-PCS | Performed by: STUDENT IN AN ORGANIZED HEALTH CARE EDUCATION/TRAINING PROGRAM

## 2024-04-23 PROCEDURE — 93005 ELECTROCARDIOGRAM TRACING: CPT | Performed by: STUDENT IN AN ORGANIZED HEALTH CARE EDUCATION/TRAINING PROGRAM

## 2024-04-23 PROCEDURE — 80053 COMPREHEN METABOLIC PANEL: CPT

## 2024-04-23 PROCEDURE — 87077 CULTURE AEROBIC IDENTIFY: CPT

## 2024-04-23 PROCEDURE — 71045 X-RAY EXAM CHEST 1 VIEW: CPT

## 2024-04-23 PROCEDURE — 6370000000 HC RX 637 (ALT 250 FOR IP): Performed by: STUDENT IN AN ORGANIZED HEALTH CARE EDUCATION/TRAINING PROGRAM

## 2024-04-23 PROCEDURE — 51798 US URINE CAPACITY MEASURE: CPT

## 2024-04-23 PROCEDURE — 81001 URINALYSIS AUTO W/SCOPE: CPT

## 2024-04-23 PROCEDURE — 82805 BLOOD GASES W/O2 SATURATION: CPT

## 2024-04-23 PROCEDURE — 94660 CPAP INITIATION&MGMT: CPT

## 2024-04-23 PROCEDURE — 96365 THER/PROPH/DIAG IV INF INIT: CPT

## 2024-04-23 PROCEDURE — 2580000003 HC RX 258: Performed by: STUDENT IN AN ORGANIZED HEALTH CARE EDUCATION/TRAINING PROGRAM

## 2024-04-23 RX ORDER — 0.9 % SODIUM CHLORIDE 0.9 %
1000 INTRAVENOUS SOLUTION INTRAVENOUS ONCE
Status: COMPLETED | OUTPATIENT
Start: 2024-04-23 | End: 2024-04-23

## 2024-04-23 RX ORDER — ALBUTEROL SULFATE 2.5 MG/3ML
2.5 SOLUTION RESPIRATORY (INHALATION) ONCE
Status: COMPLETED | OUTPATIENT
Start: 2024-04-23 | End: 2024-04-23

## 2024-04-23 RX ORDER — MIDAZOLAM HYDROCHLORIDE 2 MG/2ML
2 INJECTION, SOLUTION INTRAMUSCULAR; INTRAVENOUS EVERY 4 HOURS
Status: DISCONTINUED | OUTPATIENT
Start: 2024-04-23 | End: 2024-04-23

## 2024-04-23 RX ORDER — DEXTROSE MONOHYDRATE 100 MG/ML
INJECTION, SOLUTION INTRAVENOUS CONTINUOUS PRN
Status: DISCONTINUED | OUTPATIENT
Start: 2024-04-23 | End: 2024-04-26 | Stop reason: HOSPADM

## 2024-04-23 RX ORDER — LORAZEPAM 2 MG/ML
1 INJECTION INTRAMUSCULAR ONCE
Status: COMPLETED | OUTPATIENT
Start: 2024-04-23 | End: 2024-04-23

## 2024-04-23 RX ORDER — GLUCAGON 1 MG/ML
1 KIT INJECTION PRN
Status: DISCONTINUED | OUTPATIENT
Start: 2024-04-23 | End: 2024-04-26 | Stop reason: HOSPADM

## 2024-04-23 RX ADMIN — SODIUM ZIRCONIUM CYCLOSILICATE 10 G: 5 POWDER, FOR SUSPENSION ORAL at 20:00

## 2024-04-23 RX ADMIN — ALBUTEROL SULFATE 2.5 MG: 2.5 SOLUTION RESPIRATORY (INHALATION) at 18:05

## 2024-04-23 RX ADMIN — SODIUM CHLORIDE 1000 ML: 9 INJECTION, SOLUTION INTRAVENOUS at 20:15

## 2024-04-23 RX ADMIN — DEXTROSE MONOHYDRATE 250 ML: 100 INJECTION, SOLUTION INTRAVENOUS at 20:05

## 2024-04-23 RX ADMIN — INSULIN HUMAN 10 UNITS: 100 INJECTION, SOLUTION PARENTERAL at 20:29

## 2024-04-23 RX ADMIN — CEFEPIME 2000 MG: 2 INJECTION, POWDER, FOR SOLUTION INTRAVENOUS at 20:25

## 2024-04-23 RX ADMIN — LORAZEPAM 1 MG: 2 INJECTION INTRAMUSCULAR; INTRAVENOUS at 19:47

## 2024-04-23 NOTE — PROGRESS NOTES
Pt wore bipap for about 5 minutes and pulled mask off. He refused to put it back on. Informed Dr Mena about this. He is ordering pt on vapotherm instead.

## 2024-04-23 NOTE — ED TRIAGE NOTES
Pt arrived via ems with c/o AMS. Pt has been seeing some things. Has fallen 3 times this past weekend but didn't want to get seen. States he fell on his right hip and that his leg gives out on him. Denies hitting his head or losing consciousness. Hx recent uti. Denies having any blood in his urine or having any pain. Baseline on 3L of oxygen

## 2024-04-23 NOTE — ED PROVIDER NOTES
CBC with Auto Differential    CMP    Urinalysis with Reflex to Culture    Blood Gas, Venous    Urine Microscopic with Reflex to Culture    Blood Gas, Venous    Bladder scan    HYPOGLYCEMIA TREATMENT: blood glucose LESS THAN 70 mg/dL and patient ALERT and TOLERATING PO    HYPOGLYCEMIA TREATMENT: blood glucose LESS THAN 70 mg/dL and patient NOT ALERT or NPO    Adult NIV/Positive Airway Pressure    POCT Glucose    POCT Glucose    POCT Glucose    POCT Glucose    EKG 12 Lead       MEDICATIONS ORDERED:  Orders Placed This Encounter   Medications    albuterol (PROVENTIL) (2.5 MG/3ML) 0.083% nebulizer solution 2.5 mg     Order Specific Question:   Initiate RT Bronchodilator Protocol     Answer:   No    DISCONTD: midazolam PF (VERSED) injection 2 mg    ceFEPIme (MAXIPIME) 2,000 mg in sodium chloride 0.9 % 100 mL IVPB (mini-bag)     Order Specific Question:   Antimicrobial Indications     Answer:   Urinary Tract Infection    sodium chloride 0.9 % bolus 1,000 mL    AND Linked Order Group     insulin regular (HUMULIN R;NOVOLIN R) injection 10 Units     dextrose bolus 10% 250 mL    glucose chewable tablet 16 g    OR Linked Order Group     dextrose bolus 10% 125 mL     dextrose bolus 10% 250 mL    glucagon injection 1 mg    dextrose 10 % infusion    sodium zirconium cyclosilicate (LOKELMA) oral suspension 10 g    LORazepam (ATIVAN) injection 1 mg         PROCEDURES     Procedures    DIAGNOSTIC RESULTS / EMERGENCY DEPARTMENT COURSE / MDM     LAB RESULTS:  Results for orders placed or performed during the hospital encounter of 04/23/24   CBC with Auto Differential   Result Value Ref Range    WBC 5.6 4.0 - 10.5 K/CU MM    RBC 3.41 (L) 4.6 - 6.2 M/CU MM    Hemoglobin 9.3 (L) 13.5 - 18.0 GM/DL    Hematocrit 32.8 (L) 42 - 52 %    MCV 96.2 78 - 100 FL    MCH 27.3 27 - 31 PG    MCHC 28.4 (L) 32.0 - 36.0 %    RDW 16.5 (H) 11.7 - 14.9 %    Platelets 111 (L) 140 - 440 K/CU MM    MPV 11.7 (H) 7.5 - 11.1 FL    Differential Type AUTOMATED  Ref Range    RBC, UA 58 (H) 0 - 3 /HPF    WBC,  (H) 0 - 2 /HPF    Bacteria, UA OCCASIONAL (A) NEGATIVE /HPF    Yeast, UA MANY /HPF    Trichomonas NONE SEEN NONE SEEN /HPF    Hyaline Casts, UA >20 /LPF   EKG 12 Lead   Result Value Ref Range    Ventricular Rate 90 BPM    Atrial Rate 78 BPM    QRS Duration 98 ms    Q-T Interval 396 ms    QTc Calculation (Bazett) 484 ms    R Axis -41 degrees    T Axis 79 degrees    Diagnosis       Atrial fibrillation with premature ventricular or aberrantly conducted complexes  Left axis deviation  Nonspecific ST and T wave abnormality  When compared with ECG of 02-MAR-2020 18:40,  Atrial fibrillation has replaced Sinus rhythm  QRS voltage has decreased  Nonspecific T wave abnormality, worse in Lateral leads           RADIOLOGY:  XR CHEST PORTABLE   Final Result   1. Cardiomegaly with small pleural effusions and mild pulmonary edema.      Electronically signed by Jim Bourne MD      CT CERVICAL SPINE WO CONTRAST   Final Result   1. Limited exam due to excessive motion artifact. No acute traumatic abnormality identified.   2. Multilevel degenerative disc disease and facet arthropathy with mild or moderate bilateral foraminal narrowing in the lower cervical spine. This could be evaluated further with nonemergent MRI of the cervical spine of clinically warranted.   3. Calcification along the posterior ligamentum flavum at the level of C6 presumably related to crystal deposition arthropathy.      IMPRESSION:   1. No acute traumatic abnormality of the cervical spine.      Electronically signed by Jim Bourne MD      CT HEAD WO CONTRAST   Final Result   1. No acute intracranial hemorrhage.   2. Multiple sites of remote infarct with encephalomalacia in the right parietal and occipital region, left occipital region, and left cerebellum.   3. Atrophy and white matter hypodensity compatible with chronic small vessel ischemic disease.      Electronically signed by Jim Bourne MD  dehydration considered.    VBG returns and is interpreted by me and shows respiratory acidosis.  Patient was placed on BiPAP but ultimately tolerated it very poorly.  1 mg IV Ativan was ordered in order to to facilitate BiPAP mask and CT scans.  On reevaluation he is tolerating BiPAP well.    CT head and cervical spine do not show any acute abnormalities that show old infarcts and encephalomalacia.    Urinalysis returns with many WBCs, hematuria but no bacteria.  Per review of previous urine culture he did grow Pseudomonas.  Patient was placed on IV cefepime.    Chest x-ray does not show any pneumonia does show cardiomegaly and mild pulmonary edema and small bilateral pleural effusions.    Suspect his altered mental status is due to acute UTI and hypercapnia as well as chronic dementia.  I called discussed case with hospitalist who agrees admit patient.  Hospitalist will follow-up CT abdomen.  Admitted in improved condition.  Will require sitter.    Problems Addressed:  Acute respiratory failure with hypoxia and hypercapnia (HCC): acute illness or injury that poses a threat to life or bodily functions  Acute urinary retention: acute illness or injury that poses a threat to life or bodily functions  GAUDENCIO (acute kidney injury) (HCC): acute illness or injury  Anemia, unspecified type: chronic illness or injury  COPD exacerbation (HCC): acute illness or injury  Delirium: acute illness or injury  Hyperkalemia: acute illness or injury  Urinary tract infection without hematuria, site unspecified: acute illness or injury    Amount and/or Complexity of Data Reviewed  Labs: ordered. Decision-making details documented in ED Course.  Radiology: ordered.  ECG/medicine tests: ordered.    Risk  OTC drugs.  Prescription drug management.  Decision regarding hospitalization.         ED Course as of 04/23/24 2031 Tue Apr 23, 2024   1835 VBG interpreted by me shows respiratory acidosis.  Some which appears to be chronic his bicarb is

## 2024-04-24 ENCOUNTER — APPOINTMENT (OUTPATIENT)
Dept: CT IMAGING | Age: 74
End: 2024-04-24
Payer: MEDICARE

## 2024-04-24 LAB
ALBUMIN SERPL-MCNC: 3.9 GM/DL (ref 3.4–5)
ALP BLD-CCNC: 120 IU/L (ref 40–128)
ALT SERPL-CCNC: 18 U/L (ref 10–40)
ANION GAP SERPL CALCULATED.3IONS-SCNC: 12 MMOL/L (ref 7–16)
AST SERPL-CCNC: 28 IU/L (ref 15–37)
BASOPHILS ABSOLUTE: 0 K/CU MM
BASOPHILS RELATIVE PERCENT: 0.2 % (ref 0–1)
BILIRUB SERPL-MCNC: 0.7 MG/DL (ref 0–1)
BUN SERPL-MCNC: 64 MG/DL (ref 6–23)
CALCIUM SERPL-MCNC: 8.8 MG/DL (ref 8.3–10.6)
CHLORIDE BLD-SCNC: 104 MMOL/L (ref 99–110)
CO2: 29 MMOL/L (ref 21–32)
CREAT SERPL-MCNC: 2.6 MG/DL (ref 0.9–1.3)
DIFFERENTIAL TYPE: ABNORMAL
EKG ATRIAL RATE: 78 BPM
EKG DIAGNOSIS: NORMAL
EKG Q-T INTERVAL: 396 MS
EKG QRS DURATION: 98 MS
EKG QTC CALCULATION (BAZETT): 484 MS
EKG R AXIS: -41 DEGREES
EKG T AXIS: 79 DEGREES
EKG VENTRICULAR RATE: 90 BPM
EOSINOPHILS ABSOLUTE: 0.2 K/CU MM
EOSINOPHILS RELATIVE PERCENT: 3 % (ref 0–3)
GFR SERPL CREATININE-BSD FRML MDRD: 25 ML/MIN/1.73M2
GLUCOSE BLD-MCNC: 82 MG/DL (ref 70–99)
GLUCOSE BLD-MCNC: 84 MG/DL (ref 70–99)
GLUCOSE BLD-MCNC: 85 MG/DL (ref 70–99)
GLUCOSE BLD-MCNC: 92 MG/DL (ref 70–99)
GLUCOSE SERPL-MCNC: 75 MG/DL (ref 70–99)
HCT VFR BLD CALC: 31.8 % (ref 42–52)
HEMOGLOBIN: 9.1 GM/DL (ref 13.5–18)
IMMATURE NEUTROPHIL %: 0.2 % (ref 0–0.43)
INR BLD: 1.7 INDEX
LYMPHOCYTES ABSOLUTE: 0.9 K/CU MM
LYMPHOCYTES RELATIVE PERCENT: 17.4 % (ref 24–44)
MAGNESIUM: 2.4 MG/DL (ref 1.8–2.4)
MCH RBC QN AUTO: 27.2 PG (ref 27–31)
MCHC RBC AUTO-ENTMCNC: 28.6 % (ref 32–36)
MCV RBC AUTO: 94.9 FL (ref 78–100)
MONOCYTES ABSOLUTE: 0.7 K/CU MM
MONOCYTES RELATIVE PERCENT: 13.6 % (ref 0–4)
NEUTROPHILS RELATIVE PERCENT: 65.6 % (ref 36–66)
NUCLEATED RBC %: 0 %
PDW BLD-RTO: 16.6 % (ref 11.7–14.9)
PLATELET # BLD: 88 K/CU MM (ref 140–440)
PMV BLD AUTO: 10.9 FL (ref 7.5–11.1)
POTASSIUM SERPL-SCNC: 4.9 MMOL/L (ref 3.5–5.1)
PROTHROMBIN TIME: 20.3 SECONDS (ref 11.7–14.5)
RBC # BLD: 3.35 M/CU MM (ref 4.6–6.2)
SEGMENTED NEUTROPHILS ABSOLUTE COUNT: 3.5 K/CU MM
SODIUM BLD-SCNC: 145 MMOL/L (ref 135–145)
TOTAL IMMATURE NEUTOROPHIL: 0.01 K/CU MM
TOTAL NUCLEATED RBC: 0 K/CU MM
TOTAL PROTEIN: 6.7 GM/DL (ref 6.4–8.2)
WBC # BLD: 5.4 K/CU MM (ref 4–10.5)

## 2024-04-24 PROCEDURE — 6370000000 HC RX 637 (ALT 250 FOR IP): Performed by: STUDENT IN AN ORGANIZED HEALTH CARE EDUCATION/TRAINING PROGRAM

## 2024-04-24 PROCEDURE — 85610 PROTHROMBIN TIME: CPT

## 2024-04-24 PROCEDURE — 94640 AIRWAY INHALATION TREATMENT: CPT

## 2024-04-24 PROCEDURE — 2580000003 HC RX 258: Performed by: STUDENT IN AN ORGANIZED HEALTH CARE EDUCATION/TRAINING PROGRAM

## 2024-04-24 PROCEDURE — 94761 N-INVAS EAR/PLS OXIMETRY MLT: CPT

## 2024-04-24 PROCEDURE — 36415 COLL VENOUS BLD VENIPUNCTURE: CPT

## 2024-04-24 PROCEDURE — 82962 GLUCOSE BLOOD TEST: CPT

## 2024-04-24 PROCEDURE — 80053 COMPREHEN METABOLIC PANEL: CPT

## 2024-04-24 PROCEDURE — 93010 ELECTROCARDIOGRAM REPORT: CPT | Performed by: INTERNAL MEDICINE

## 2024-04-24 PROCEDURE — 94660 CPAP INITIATION&MGMT: CPT

## 2024-04-24 PROCEDURE — 2700000000 HC OXYGEN THERAPY PER DAY

## 2024-04-24 PROCEDURE — 6360000002 HC RX W HCPCS: Performed by: STUDENT IN AN ORGANIZED HEALTH CARE EDUCATION/TRAINING PROGRAM

## 2024-04-24 PROCEDURE — 74176 CT ABD & PELVIS W/O CONTRAST: CPT

## 2024-04-24 PROCEDURE — 2140000000 HC CCU INTERMEDIATE R&B

## 2024-04-24 PROCEDURE — 83735 ASSAY OF MAGNESIUM: CPT

## 2024-04-24 PROCEDURE — 76937 US GUIDE VASCULAR ACCESS: CPT

## 2024-04-24 PROCEDURE — 92610 EVALUATE SWALLOWING FUNCTION: CPT | Performed by: SPEECH-LANGUAGE PATHOLOGIST

## 2024-04-24 PROCEDURE — 85025 COMPLETE CBC W/AUTO DIFF WBC: CPT

## 2024-04-24 RX ORDER — MIDODRINE HYDROCHLORIDE 5 MG/1
10 TABLET ORAL
Status: DISCONTINUED | OUTPATIENT
Start: 2024-04-24 | End: 2024-04-26 | Stop reason: HOSPADM

## 2024-04-24 RX ORDER — SODIUM CHLORIDE 0.9 % (FLUSH) 0.9 %
5-40 SYRINGE (ML) INJECTION PRN
Status: DISCONTINUED | OUTPATIENT
Start: 2024-04-24 | End: 2024-04-26 | Stop reason: HOSPADM

## 2024-04-24 RX ORDER — METOPROLOL SUCCINATE 50 MG/1
50 TABLET, EXTENDED RELEASE ORAL 2 TIMES DAILY
Status: DISCONTINUED | OUTPATIENT
Start: 2024-04-24 | End: 2024-04-26 | Stop reason: HOSPADM

## 2024-04-24 RX ORDER — FINASTERIDE 5 MG/1
5 TABLET, FILM COATED ORAL DAILY
Status: DISCONTINUED | OUTPATIENT
Start: 2024-04-24 | End: 2024-04-26 | Stop reason: HOSPADM

## 2024-04-24 RX ORDER — PANTOPRAZOLE SODIUM 40 MG/1
40 TABLET, DELAYED RELEASE ORAL
Status: DISCONTINUED | OUTPATIENT
Start: 2024-04-24 | End: 2024-04-26 | Stop reason: HOSPADM

## 2024-04-24 RX ORDER — CITALOPRAM 20 MG/1
10 TABLET ORAL 2 TIMES DAILY
Status: DISCONTINUED | OUTPATIENT
Start: 2024-04-24 | End: 2024-04-26 | Stop reason: HOSPADM

## 2024-04-24 RX ORDER — ONDANSETRON 2 MG/ML
4 INJECTION INTRAMUSCULAR; INTRAVENOUS EVERY 6 HOURS PRN
Status: DISCONTINUED | OUTPATIENT
Start: 2024-04-24 | End: 2024-04-26 | Stop reason: HOSPADM

## 2024-04-24 RX ORDER — POTASSIUM CHLORIDE 7.45 MG/ML
10 INJECTION INTRAVENOUS PRN
Status: DISCONTINUED | OUTPATIENT
Start: 2024-04-24 | End: 2024-04-26 | Stop reason: HOSPADM

## 2024-04-24 RX ORDER — POTASSIUM CHLORIDE 20 MEQ/1
40 TABLET, EXTENDED RELEASE ORAL PRN
Status: DISCONTINUED | OUTPATIENT
Start: 2024-04-24 | End: 2024-04-26 | Stop reason: HOSPADM

## 2024-04-24 RX ORDER — ACETAMINOPHEN 325 MG/1
650 TABLET ORAL EVERY 6 HOURS PRN
Status: DISCONTINUED | OUTPATIENT
Start: 2024-04-24 | End: 2024-04-26 | Stop reason: HOSPADM

## 2024-04-24 RX ORDER — ACETAMINOPHEN 650 MG/1
650 SUPPOSITORY RECTAL EVERY 6 HOURS PRN
Status: DISCONTINUED | OUTPATIENT
Start: 2024-04-24 | End: 2024-04-26 | Stop reason: HOSPADM

## 2024-04-24 RX ORDER — POLYETHYLENE GLYCOL 3350 17 G/17G
17 POWDER, FOR SOLUTION ORAL DAILY PRN
Status: DISCONTINUED | OUTPATIENT
Start: 2024-04-24 | End: 2024-04-26 | Stop reason: HOSPADM

## 2024-04-24 RX ORDER — SODIUM CHLORIDE 9 MG/ML
INJECTION, SOLUTION INTRAVENOUS CONTINUOUS
Status: DISPENSED | OUTPATIENT
Start: 2024-04-24 | End: 2024-04-25

## 2024-04-24 RX ORDER — ONDANSETRON 4 MG/1
4 TABLET, ORALLY DISINTEGRATING ORAL EVERY 8 HOURS PRN
Status: DISCONTINUED | OUTPATIENT
Start: 2024-04-24 | End: 2024-04-26 | Stop reason: HOSPADM

## 2024-04-24 RX ORDER — ATORVASTATIN CALCIUM 40 MG/1
40 TABLET, FILM COATED ORAL NIGHTLY
Status: DISCONTINUED | OUTPATIENT
Start: 2024-04-24 | End: 2024-04-26 | Stop reason: HOSPADM

## 2024-04-24 RX ORDER — ALBUTEROL SULFATE 90 UG/1
2 AEROSOL, METERED RESPIRATORY (INHALATION) EVERY 4 HOURS PRN
Status: DISCONTINUED | OUTPATIENT
Start: 2024-04-24 | End: 2024-04-26 | Stop reason: HOSPADM

## 2024-04-24 RX ORDER — SODIUM CHLORIDE 9 MG/ML
INJECTION, SOLUTION INTRAVENOUS PRN
Status: DISCONTINUED | OUTPATIENT
Start: 2024-04-24 | End: 2024-04-26 | Stop reason: HOSPADM

## 2024-04-24 RX ORDER — SODIUM CHLORIDE 0.9 % (FLUSH) 0.9 %
5-40 SYRINGE (ML) INJECTION EVERY 12 HOURS SCHEDULED
Status: DISCONTINUED | OUTPATIENT
Start: 2024-04-24 | End: 2024-04-26 | Stop reason: HOSPADM

## 2024-04-24 RX ORDER — MAGNESIUM SULFATE IN WATER 40 MG/ML
2000 INJECTION, SOLUTION INTRAVENOUS PRN
Status: DISCONTINUED | OUTPATIENT
Start: 2024-04-24 | End: 2024-04-26 | Stop reason: HOSPADM

## 2024-04-24 RX ADMIN — SODIUM CHLORIDE, PRESERVATIVE FREE 10 ML: 5 INJECTION INTRAVENOUS at 10:21

## 2024-04-24 RX ADMIN — PIPERACILLIN AND TAZOBACTAM 4500 MG: 4; .5 INJECTION, POWDER, FOR SOLUTION INTRAVENOUS at 16:21

## 2024-04-24 RX ADMIN — MIDODRINE HYDROCHLORIDE 10 MG: 5 TABLET ORAL at 16:34

## 2024-04-24 RX ADMIN — APIXABAN 5 MG: 5 TABLET, FILM COATED ORAL at 09:52

## 2024-04-24 RX ADMIN — SODIUM CHLORIDE: 9 INJECTION, SOLUTION INTRAVENOUS at 10:06

## 2024-04-24 RX ADMIN — METOPROLOL SUCCINATE 50 MG: 50 TABLET, FILM COATED, EXTENDED RELEASE ORAL at 19:52

## 2024-04-24 RX ADMIN — APIXABAN 5 MG: 5 TABLET, FILM COATED ORAL at 19:53

## 2024-04-24 RX ADMIN — ATORVASTATIN CALCIUM 40 MG: 40 TABLET, FILM COATED ORAL at 19:53

## 2024-04-24 RX ADMIN — TIOTROPIUM BROMIDE AND OLODATEROL 2 PUFF: 3.124; 2.736 SPRAY, METERED RESPIRATORY (INHALATION) at 11:12

## 2024-04-24 RX ADMIN — METOPROLOL SUCCINATE 50 MG: 50 TABLET, FILM COATED, EXTENDED RELEASE ORAL at 09:52

## 2024-04-24 RX ADMIN — SODIUM CHLORIDE, PRESERVATIVE FREE 10 ML: 5 INJECTION INTRAVENOUS at 19:56

## 2024-04-24 RX ADMIN — CITALOPRAM HYDROBROMIDE 10 MG: 20 TABLET ORAL at 19:52

## 2024-04-24 RX ADMIN — FINASTERIDE 5 MG: 5 TABLET, FILM COATED ORAL at 09:52

## 2024-04-24 RX ADMIN — PIPERACILLIN AND TAZOBACTAM 3375 MG: 3; .375 INJECTION, POWDER, LYOPHILIZED, FOR SOLUTION INTRAVENOUS at 23:14

## 2024-04-24 ASSESSMENT — PAIN SCALES - GENERAL
PAINLEVEL_OUTOF10: 0

## 2024-04-24 ASSESSMENT — PAIN SCALES - WONG BAKER
WONGBAKER_NUMERICALRESPONSE: NO HURT
WONGBAKER_NUMERICALRESPONSE: NO HURT

## 2024-04-24 NOTE — CARE COORDINATION
04/24/24 1516   Service Assessment   Patient Orientation Other (see comment)  (dementia)   Cognition Dementia / Early Alzheimer's   History Provided By Child/Family   Primary Caregiver Self   Support Systems Family Members   Patient's Healthcare Decision Maker is: Named in Scanned ACP Document   PCP Verified by CM Yes   Prior Functional Level Assistance with the following:;Mobility   Current Functional Level Assistance with the following:;Mobility   Can patient return to prior living arrangement Unknown at present   Ability to make needs known: Poor   Family able to assist with home care needs: No   Would you like for me to discuss the discharge plan with any other family members/significant others, and if so, who? Yes  (Cousin Deanna, sister Poornima AVALOS)   Financial Resources Medicare   Community Resources ECF/Home Care     Pt with dementia.  CM call to Pt cousin Deanna and sister Poornima to initiate discharge planning.     Pt is from home alone and is active with [facility].  Both Deanna and Poornima believe that the Pt could benefit from SNF placement at discharge if recommended.  Per family, Pt has had numerous recent falls.     Pt was at [facility] in the past and they are agreeable for a referral to be made if needed.      PT/OT ordered.  CM following for recommendations.

## 2024-04-24 NOTE — CARE COORDINATION
Discussed in IDR. Dr informed CM that pt will need to go to a SNF. CM attempted to see pt for d/c planning. Pt is resting in bed with eyes closed. No family at bedside. NIKOLAS will attempt again later today. TE

## 2024-04-24 NOTE — PLAN OF CARE
Problem: Discharge Planning  Goal: Discharge to home or other facility with appropriate resources  4/24/2024 0338 by Milla Tapia RN  Outcome: Progressing  4/24/2024 0338 by Milla Tapia RN  Outcome: Progressing     Problem: Pain  Goal: Verbalizes/displays adequate comfort level or baseline comfort level  4/24/2024 0338 by Milla Tapia RN  Outcome: Progressing  4/24/2024 0338 by Milla Tapia RN  Outcome: Progressing     Problem: Safety - Adult  Goal: Free from fall injury  4/24/2024 0338 by Milla Tapia RN  Outcome: Progressing  4/24/2024 0338 by Milla Tapia RN  Outcome: Progressing     Problem: Skin/Tissue Integrity  Goal: Absence of new skin breakdown  Description: 1.  Monitor for areas of redness and/or skin breakdown  2.  Assess vascular access sites hourly  3.  Every 4-6 hours minimum:  Change oxygen saturation probe site  4.  Every 4-6 hours:  If on nasal continuous positive airway pressure, respiratory therapy assess nares and determine need for appliance change or resting period.  4/24/2024 0338 by Milla Tapia RN  Outcome: Progressing  4/24/2024 0338 by Milla Tapia RN  Outcome: Progressing     Problem: Chronic Conditions and Co-morbidities  Goal: Patient's chronic conditions and co-morbidity symptoms are monitored and maintained or improved  Outcome: Progressing

## 2024-04-24 NOTE — PROGRESS NOTES
0950 Slp rounded on pt see her notes. Pt was placed on 4 Lnc oxygen and oxygen saturation remains 100%.  1000 De. LINDSEY'Jaswinder  rounding on pt now. He stated ok to leave pt on 4lnc . Pt alert  cooperative . In no distress.

## 2024-04-24 NOTE — H&P
History and Physical      Name:  Clau Bueno /Age/Sex: 1950  (74 y.o. male)   MRN & CSN:  [id number] & [id number] Encounter Date/Time: 2024 10:54 PM EDT   Location:  ED19/ED-19 PCP: Yuan Sol MD       Hospital Day: 1    Assessment and Plan:     Patient is a 74 y.o. male who presented with AMS       Acute Encephalopathy superimposed on chronic dementia with behavioral disturbance  -Alert and oriented x 1-2 at baseline however presented combative s/p ativan in ED and patient became somnolent   -History of sundowning per family  -Likely multifactorial in nature as acute encephalopathy 2/2 UTI and urinary retention superimposed on decreases cognitive reserve precipitated by benzo use     -Delirium precautions, sitter as needed  -Abx     UTI  - presented with AMS and urinary retention  -Non-septic and UA suggestive of infection   -previous urine cx showed pseudomonas and enterococcus   -Received IVF and cefepime in ED    -Avoid cefepime due to CKD and can cause worsening encephalopathy. Will start zosyn to cover for both enterococcus and pseudomonas   - follow-up urine culture, blood cultures     GAUDENCIO on CKD   Bilateral hydronephrosis   Urinary retention   hyperkalemia  -Cr 2.9 on admit (baseline ~1.3), K 5.4   - CT A/P with bilateral hydronephrosis with marked distention of the bladder  -Likely pre-renal 2/2 decreased PO intake, and pos-obstructive from urinary retention as >2L s/p horta placement   -s/p hyperkalemia protocol    -IVF  -Avoid nephrotoxin  -Monitor I/O  -Continue horta    -Repeat labs     Chronic HFrEF  - last echo on file 3/24 with EF 35-40%, global hypokinesis   -Euvolemic on exam     Acute on chronic anemia 2/2 blood loss  - Hgb 9.6, last Hgb 3/10/24 10.4, but previous baseline ~ 13  - has recent admission at [facility] for epistaxis  -No signs of active bleeding on admission  -Monitor H&H     Chronic respiratory failure with hypoxia  -On 3L NC at baseline, no increasing oxygen  mother; Hypertension in his maternal grandfather.    SHx:   Social History     Socioeconomic History    Marital status: Single   Tobacco Use    Smoking status: Former     Current packs/day: 0.00     Average packs/day: 1 pack/day for 30.0 years (30.0 ttl pk-yrs)     Types: Cigarettes     Start date: 1980     Quit date: 2010     Years since quittin.2    Smokeless tobacco: Never   Vaping Use    Vaping Use: Never used   Substance and Sexual Activity    Alcohol use: Not Currently     Comment: caffeine 2-3 pop/cofee a day    Drug use: No     Social Determinants of Health     Food Insecurity: No Food Insecurity (3/24/2024)    Hunger Vital Sign     Worried About Running Out of Food in the Last Year: Never true     Ran Out of Food in the Last Year: Never true   Transportation Needs: No Transportation Needs (3/24/2024)    PRAPARE - Transportation     Lack of Transportation (Medical): No     Lack of Transportation (Non-Medical): No   Housing Stability: Low Risk  (3/24/2024)    Housing Stability Vital Sign     Unable to Pay for Housing in the Last Year: No     Number of Places Lived in the Last Year: 0     Unstable Housing in the Last Year: No       Medications Prior to Admission     Prior to Admission medications    Medication Sig Start Date End Date Taking? Authorizing Provider   metoprolol succinate (TOPROL XL) 50 MG extended release tablet Take 1 tablet by mouth in the morning and at bedtime 3/26/24   Obi Novak APRN - CNP   midodrine (PROAMATINE) 10 MG tablet Take 1 tablet by mouth 3 times daily (with meals) 3/26/24   Obi Novak APRN - CNP   apixaban (ELIQUIS) 5 MG TABS tablet Take 1 tablet by mouth 2 times daily 3/26/24   Obi Novak APRN - CNP   finasteride (PROSCAR) 5 MG tablet Take 1 tablet by mouth daily    ProviderCheryl MD   pregabalin (LYRICA) 100 MG capsule Take 1 capsule by mouth 2 times daily.    Cheryl Humphrey MD   tiotropium-olodaterol (STIOLTO RESPIMAT) 2.5-2.5 MCG/ACT AERS INHALE 2

## 2024-04-24 NOTE — PROGRESS NOTES
Patient on continuous BIPAP. 0600 protonix held, patient not alert enough to nurses bedside swallowing eval for medications.

## 2024-04-24 NOTE — CONSULTS
[address] [address]   Consult Note  [facility] 1 2 3 4 5    Date: 2024   Patient: Clau Bueno   : 1950   DOA: 2024   MRN: [id number]   ROOM#: [id number]     Reason for Consult: UTI and urinary retention leading to GAUDENCIO  Requesting Physician: Dr. Washington  Collaborating Urologist on Call at time of admission: Dr. Villeda  CHIEF COMPLAINT: AMS    History Obtained From: patient, electronic medical record    HISTORY OF PRESENT ILLNESS:                The patient is a 74 y.o. male with significant past medical history of CVA, A-fib on ASA/Eliquis, CHF, chronic respiratory failure, CKD, BPH, acute urinary retention, UTI's, dementia,arthritis, GERD, GABRIEL, HLD, and HTN who presented with AMS and foul-smelling urine x4 days. He has also fallen multiple times at home in recent days. A horta was placed in the ED with 2,200cc urine return. Further work-up revealed GAUDENCIO with bilateral hydroureteronephrosis on CT and concern for a UTI for which he was admitted. This morning, pt is noticeably lethargic and barely opens his eyes during my exam. He is able to mumble words and denies any pain or complaints today. Denies any issues voiding prior to hospitalization.     ED Provider's HPI 24: Clau Bueno is a 74 y.o. male who presents with worsening mentation and foul-smelling urine.  Patient is a 74-year-old male who lives with his cousin who is his primary caretaker.  He has a history of dementia hypertension, A-fib on Eliquis, hyperlipidemia, sleep apnea, COPD on chronic oxygen 2 L, previous stroke.  He was treated for UTI, had a prolonged course requiring at least 10 days of antibiotics.  Saw his urologist, Dr. Riley recently who stated the infection was clear.  Family states over the weekend he has had 3 mechanical falls.  They state that he stood up too quickly and did not get his balance his legs became shaky and he collapsed.  Mostly landed on his hips they do not think he hit

## 2024-04-24 NOTE — ED NOTES
ED TO INPATIENT SBAR HANDOFF    Patient Name: Clau Bueno   :  1950  74 y.o.   Preferred Name  Clau  Family/Caregiver Present no   Restraints no   C-SSRS:    Sitter no, but may need a sitter once ativan wears off.   Sepsis Risk Score Sepsis Risk Score: 3.62      Situation  Chief Complaint   Patient presents with    Altered Mental Status     Brief Description of Patient's Condition: Patient arrived with altered mental status. Patient being admitted for UTI, COPD exacerbation, urinary retention, GAUDENCIO, anemia.   Mental Status: {IP PT MENTAL STATUS:}  Arrived from: {Places; discharge locations:78774}    Imaging:   XR CHEST PORTABLE   Final Result   1. Cardiomegaly with small pleural effusions and mild pulmonary edema.      Electronically signed by Jim Bourne MD      CT CERVICAL SPINE WO CONTRAST   Final Result   1. Limited exam due to excessive motion artifact. No acute traumatic abnormality identified.   2. Multilevel degenerative disc disease and facet arthropathy with mild or moderate bilateral foraminal narrowing in the lower cervical spine. This could be evaluated further with nonemergent MRI of the cervical spine of clinically warranted.   3. Calcification along the posterior ligamentum flavum at the level of C6 presumably related to crystal deposition arthropathy.      IMPRESSION:   1. No acute traumatic abnormality of the cervical spine.      Electronically signed by Jim Bourne MD      CT HEAD WO CONTRAST   Final Result   1. No acute intracranial hemorrhage.   2. Multiple sites of remote infarct with encephalomalacia in the right parietal and occipital region, left occipital region, and left cerebellum.   3. Atrophy and white matter hypodensity compatible with chronic small vessel ischemic disease.      Electronically signed by Jim Bourne MD      CT ABDOMEN PELVIS WO CONTRAST Additional Contrast? None    (Results Pending)     Abnormal labs:   Abnormal Labs Reviewed   CBC WITH AUTO  Pleural effusion     per old chart pt had CXR done 2/13/2019 noted large pleural effusion for thoracentesis 2/14/2019    Shortness of breath 2/27/2019    Unspecified cerebral artery occlusion with cerebral infarction 1997    no residual effects       Assessment    Vitals: MEWS Score: 1  Level of Consciousness: Alert (0)   Vitals:    04/23/24 1714 04/23/24 1807 04/23/24 1841 04/23/24 2031   BP: (!) 134/93   113/82   Pulse: 86 89 85 93   Resp: 16 18 11 16   Temp: 97.6 °F (36.4 °C)      TempSrc: Oral      SpO2: 96% 100% 100% 99%     PO Status: {Slp diets:44268}  O2 Flow Rate: O2 Device: Nasal cannula O2 Flow Rate (L/min): 3 L/min  Cardiac Rhythm: ***  Last documented pain medication administered: ***  NIH Score: NIH     Active LDA's:   Peripheral IV 04/23/24 Right Hand (Active)       Pertinent or High Risk Medications/Drips: {YES OR NO:393058}   If Yes, please provide details: ***  Blood Product Administration: {YES OR NO:529699}  If Yes, please provide details: ***    Recommendation    Incomplete orders ***  Additional Comments: ***   If any further questions, please call Sending RN at ***    Electronically signed by: Electronically signed by Citlalli Monroe RN on 4/23/2024 at 9:00 PM

## 2024-04-24 NOTE — PROGRESS NOTES
V2.0  [facility] Progress Note      Name:  Clau Bueno /Age/Sex: 1950  (74 y.o. male)   MRN & CSN:  [id number] & [id number] Encounter Date/Time: 2024 12:59 PM EDT    Location:  [address] [place] [address] PCP: Yuan Sol MD       Hospital Day: 2    Assessment and Plan:   Clau Bueno is a 74 y.o. male who presents with Acute metabolic encephalopathy      Plan:    Acute encephalopathy  Patient with history of dementia as well as an acute infection.  Alert and oriented at baseline is 1-2.  Treat infection  Avoid delirium    Urinary tract infection  UA consistent with infection.  Prior cultures have grown both Pseudomonas and Enterococcus  IV Zosyn  Follow-up urine and blood cultures    Urinary retention  Urology consulted  Garrett in place    Acute kidney injury  Hyperkalemia  Patient creatinine went 2.9 presentation and potassium up to 5.4.  Improved with Garrett placement IV hydration  Continue gentle hydration  Continue Garrett  Avoid nephrotoxic agents    Chronic respiratory failure with hypoxia  No other concern for respiratory issues at this time  Continue supplemental oxygen as needed: Baseline 3 L    HTN   -Continue toprol      History of Afib   - continue Eliquis and BB     History of CVA   - continue Eliquis, ASA         Diet ADULT DIET; Full Liquid   DVT Prophylaxis [] Lovenox, []  Heparin, [] SCDs, [] Ambulation,    [x] Eliquis, [] Xarelto  [] Coumadin [] other   Code Status Limited   Disposition From: [facility]  Expected Disposition: SNF  Estimated Date of Discharge: 2-4 days  Patient requires continued admission due to GAUDENCIO, IV ABx   Surrogate Decision Maker/ POA      Personally reviewed Lab Studies and Imaging     Discussed management of the case with urology who recommended Garrett placement    EKG interpreted personally and results no acute ST changes    Imaging that was interpreted personally includes CT abdomen and results  hydronephrosis    Subjective:     Chief Complaint: Altered Mental  produced using speech recognition software and may contain errors related to that system including errors in grammar, punctuation, and spelling, as well as words and phrases that may be inappropriate. If there are any questions or concerns please feel free to contact the dictating provider for clarification.     Electronically signed by Bao Oquendo MD on 4/24/2024 at 12:59 PM

## 2024-04-24 NOTE — PLAN OF CARE
Problem: Discharge Planning  Goal: Discharge to home or other facility with appropriate resources  4/24/2024 1234 by Bart French, RN  Outcome: Progressing  4/24/2024 0338 by Milla Tapia RN  Outcome: Progressing  4/24/2024 0338 by Milla Tapia RN  Outcome: Progressing     Problem: Pain  Goal: Verbalizes/displays adequate comfort level or baseline comfort level  4/24/2024 1234 by Bart French RN  Outcome: Progressing  4/24/2024 0338 by Milla Tapia RN  Outcome: Progressing  4/24/2024 0338 by Milla Tapia RN  Outcome: Progressing     Problem: Safety - Adult  Goal: Free from fall injury  4/24/2024 0338 by Milla Tapia RN  Outcome: Progressing  4/24/2024 0338 by Milla Tapia RN  Outcome: Progressing     Problem: Skin/Tissue Integrity  Goal: Absence of new skin breakdown  Description: 1.  Monitor for areas of redness and/or skin breakdown  2.  Assess vascular access sites hourly  3.  Every 4-6 hours minimum:  Change oxygen saturation probe site  4.  Every 4-6 hours:  If on nasal continuous positive airway pressure, respiratory therapy assess nares and determine need for appliance change or resting period.  4/24/2024 0338 by Milla Tapia RN  Outcome: Progressing  4/24/2024 0338 by Milla Tapia RN  Outcome: Progressing     Problem: Chronic Conditions and Co-morbidities  Goal: Patient's chronic conditions and co-morbidity symptoms are monitored and maintained or improved  4/24/2024 0338 by Milla Tapia RN  Outcome: Progressing

## 2024-04-24 NOTE — ED NOTES
ED TO INPATIENT SBAR HANDOFF    Patient Name: Clau Bueno   :  1950  74 y.o.   Preferred Name  Clau  Family/Caregiver Present no   Restraints no   C-SSRS:    Sitter no   Sepsis Risk Score Sepsis Risk Score: 3.62      Situation  Chief Complaint   Patient presents with    Altered Mental Status     Brief Description of Patient's Condition: Patient came in with altered metal status. Positive for UTI. Patients CO2 was also elevated. Patient placed on bipap. Patient disoriented, not stable on his feet. Garrett placed for urinary retention.   Mental Status: disoriented  Arrived from: home    Imaging:   XR CHEST PORTABLE   Final Result   1. Cardiomegaly with small pleural effusions and mild pulmonary edema.      Electronically signed by Jim Bourne MD      CT CERVICAL SPINE WO CONTRAST   Final Result   1. Limited exam due to excessive motion artifact. No acute traumatic abnormality identified.   2. Multilevel degenerative disc disease and facet arthropathy with mild or moderate bilateral foraminal narrowing in the lower cervical spine. This could be evaluated further with nonemergent MRI of the cervical spine of clinically warranted.   3. Calcification along the posterior ligamentum flavum at the level of C6 presumably related to crystal deposition arthropathy.      IMPRESSION:   1. No acute traumatic abnormality of the cervical spine.      Electronically signed by Jim Bourne MD      CT HEAD WO CONTRAST   Final Result   1. No acute intracranial hemorrhage.   2. Multiple sites of remote infarct with encephalomalacia in the right parietal and occipital region, left occipital region, and left cerebellum.   3. Atrophy and white matter hypodensity compatible with chronic small vessel ischemic disease.      Electronically signed by Jim Bourne MD      CT ABDOMEN PELVIS WO CONTRAST Additional Contrast? None    (Results Pending)     Abnormal labs:   Abnormal Labs Reviewed   CBC WITH AUTO DIFFERENTIAL -  per old chart pt had CXR done 2/13/2019 noted large pleural effusion for thoracentesis 2/14/2019    Shortness of breath 2/27/2019    Unspecified cerebral artery occlusion with cerebral infarction 1997    no residual effects       Assessment    Vitals: MEWS Score: 1  Level of Consciousness: Alert (0)   Vitals:    04/23/24 1714 04/23/24 1807 04/23/24 1841 04/23/24 2031   BP: (!) 134/93   113/82   Pulse: 86 89 85 93   Resp: 16 18 11 16   Temp: 97.6 °F (36.4 °C)      TempSrc: Oral      SpO2: 96% 100% 100% 99%     PO Status: Regular  O2 Flow Rate: O2 Device: Nasal cannula O2 Flow Rate (L/min): 3 L/min  Cardiac Rhythm: normal sinus  Last documented pain medication administered: n/a  NIH Score: NIH     Active LDA's:   Peripheral IV 04/23/24 Right Hand (Active)       Pertinent or High Risk Medications/Drips: no   If Yes, please provide details: n/a  Blood Product Administration: no  If Yes, please provide details: n/a    Recommendation    Incomplete orders n/a  Additional Comments: patient can be combative, family states he may need a sitter.    If any further questions, please call Sending RN at [phone]    Electronically signed by: Electronically signed by Citlalli Monroe RN on 4/23/2024 at 9:22 PM

## 2024-04-24 NOTE — PROGRESS NOTES
4 Eyes Skin Assessment     NAME:  Clau Bueno  YOB: 1950  MEDICAL RECORD NUMBER:  [id number]    The patient is being assessed for  Admission    I agree that at least one RN has performed a thorough Head to Toe Skin Assessment on the patient. ALL assessment sites listed below have been assessed.      Areas assessed by both nurses:    Head, Face, Ears, Shoulders, Back, Chest, Arms, Elbows, Hands, Sacrum. Buttock, Coccyx, Ischium, Legs. Feet and Heels, and Under Medical Devices         Does the Patient have a Wound? No noted wound(s)       Prudencio Prevention initiated by RN: Yes  Wound Care Orders initiated by RN: No    Pressure Injury (Stage 3,4, Unstageable, DTI, NWPT, and Complex wounds) if present, place Wound referral order by RN under : No    New Ostomies, if present place, Ostomy referral order under : No     Nurse 1 eSignature: Electronically signed by Milla Tapia RN on 4/24/24 at 2:57 AM EDT    **SHARE this note so that the co-signing nurse can place an eSignature**    Nurse 2 eSignature: Electronically signed by Jackie Bueno RN on 4/24/24 at 3:00 AM EDT

## 2024-04-25 LAB
ALBUMIN SERPL-MCNC: 3.6 GM/DL (ref 3.4–5)
ALP BLD-CCNC: 108 IU/L (ref 40–128)
ALT SERPL-CCNC: 14 U/L (ref 10–40)
ANION GAP SERPL CALCULATED.3IONS-SCNC: 9 MMOL/L (ref 7–16)
AST SERPL-CCNC: 23 IU/L (ref 15–37)
BASOPHILS ABSOLUTE: 0 K/CU MM
BASOPHILS RELATIVE PERCENT: 0.6 % (ref 0–1)
BILIRUB SERPL-MCNC: 0.9 MG/DL (ref 0–1)
BUN SERPL-MCNC: 47 MG/DL (ref 6–23)
CALCIUM SERPL-MCNC: 8.5 MG/DL (ref 8.3–10.6)
CHLORIDE BLD-SCNC: 110 MMOL/L (ref 99–110)
CO2: 30 MMOL/L (ref 21–32)
CREAT SERPL-MCNC: 1.8 MG/DL (ref 0.9–1.3)
CULTURE: ABNORMAL
CULTURE: ABNORMAL
DIFFERENTIAL TYPE: ABNORMAL
EOSINOPHILS ABSOLUTE: 0.2 K/CU MM
EOSINOPHILS RELATIVE PERCENT: 3.7 % (ref 0–3)
GFR SERPL CREATININE-BSD FRML MDRD: 39 ML/MIN/1.73M2
GLUCOSE BLD-MCNC: 101 MG/DL (ref 70–99)
GLUCOSE BLD-MCNC: 97 MG/DL (ref 70–99)
GLUCOSE SERPL-MCNC: 101 MG/DL (ref 70–99)
HCT VFR BLD CALC: 30.3 % (ref 42–52)
HEMOGLOBIN: 8.4 GM/DL (ref 13.5–18)
IMMATURE NEUTROPHIL %: 1.8 % (ref 0–0.43)
LYMPHOCYTES ABSOLUTE: 0.9 K/CU MM
LYMPHOCYTES RELATIVE PERCENT: 18.9 % (ref 24–44)
Lab: ABNORMAL
MCH RBC QN AUTO: 27.1 PG (ref 27–31)
MCHC RBC AUTO-ENTMCNC: 27.7 % (ref 32–36)
MCV RBC AUTO: 97.7 FL (ref 78–100)
MONOCYTES ABSOLUTE: 0.7 K/CU MM
MONOCYTES RELATIVE PERCENT: 13.8 % (ref 0–4)
NEUTROPHILS RELATIVE PERCENT: 61.2 % (ref 36–66)
NUCLEATED RBC %: 0 %
PDW BLD-RTO: 16.8 % (ref 11.7–14.9)
PLATELET # BLD: 110 K/CU MM (ref 140–440)
PMV BLD AUTO: 11.9 FL (ref 7.5–11.1)
POTASSIUM SERPL-SCNC: 4.2 MMOL/L (ref 3.5–5.1)
RBC # BLD: 3.1 M/CU MM (ref 4.6–6.2)
SEGMENTED NEUTROPHILS ABSOLUTE COUNT: 3 K/CU MM
SODIUM BLD-SCNC: 149 MMOL/L (ref 135–145)
SPECIMEN: ABNORMAL
TOTAL IMMATURE NEUTOROPHIL: 0.09 K/CU MM
TOTAL NUCLEATED RBC: 0 K/CU MM
TOTAL PROTEIN: 6.2 GM/DL (ref 6.4–8.2)
WBC # BLD: 4.9 K/CU MM (ref 4–10.5)

## 2024-04-25 PROCEDURE — 2700000000 HC OXYGEN THERAPY PER DAY

## 2024-04-25 PROCEDURE — 6370000000 HC RX 637 (ALT 250 FOR IP): Performed by: STUDENT IN AN ORGANIZED HEALTH CARE EDUCATION/TRAINING PROGRAM

## 2024-04-25 PROCEDURE — 92526 ORAL FUNCTION THERAPY: CPT

## 2024-04-25 PROCEDURE — 97535 SELF CARE MNGMENT TRAINING: CPT

## 2024-04-25 PROCEDURE — 6360000002 HC RX W HCPCS: Performed by: STUDENT IN AN ORGANIZED HEALTH CARE EDUCATION/TRAINING PROGRAM

## 2024-04-25 PROCEDURE — 80053 COMPREHEN METABOLIC PANEL: CPT

## 2024-04-25 PROCEDURE — 36415 COLL VENOUS BLD VENIPUNCTURE: CPT

## 2024-04-25 PROCEDURE — 6360000002 HC RX W HCPCS: Performed by: FAMILY MEDICINE

## 2024-04-25 PROCEDURE — 82962 GLUCOSE BLOOD TEST: CPT

## 2024-04-25 PROCEDURE — 97530 THERAPEUTIC ACTIVITIES: CPT

## 2024-04-25 PROCEDURE — 97162 PT EVAL MOD COMPLEX 30 MIN: CPT

## 2024-04-25 PROCEDURE — 2580000003 HC RX 258: Performed by: STUDENT IN AN ORGANIZED HEALTH CARE EDUCATION/TRAINING PROGRAM

## 2024-04-25 PROCEDURE — 94761 N-INVAS EAR/PLS OXIMETRY MLT: CPT

## 2024-04-25 PROCEDURE — 94640 AIRWAY INHALATION TREATMENT: CPT

## 2024-04-25 PROCEDURE — 97166 OT EVAL MOD COMPLEX 45 MIN: CPT

## 2024-04-25 PROCEDURE — 2140000000 HC CCU INTERMEDIATE R&B

## 2024-04-25 PROCEDURE — 85025 COMPLETE CBC W/AUTO DIFF WBC: CPT

## 2024-04-25 RX ORDER — FLUCONAZOLE 2 MG/ML
200 INJECTION, SOLUTION INTRAVENOUS EVERY 24 HOURS
Status: DISCONTINUED | OUTPATIENT
Start: 2024-04-25 | End: 2024-04-26 | Stop reason: HOSPADM

## 2024-04-25 RX ORDER — DIPHENHYDRAMINE HYDROCHLORIDE 50 MG/ML
25 INJECTION INTRAMUSCULAR; INTRAVENOUS ONCE
Status: COMPLETED | OUTPATIENT
Start: 2024-04-25 | End: 2024-04-25

## 2024-04-25 RX ADMIN — PIPERACILLIN AND TAZOBACTAM 3375 MG: 3; .375 INJECTION, POWDER, LYOPHILIZED, FOR SOLUTION INTRAVENOUS at 14:15

## 2024-04-25 RX ADMIN — PIPERACILLIN AND TAZOBACTAM 3375 MG: 3; .375 INJECTION, POWDER, LYOPHILIZED, FOR SOLUTION INTRAVENOUS at 05:13

## 2024-04-25 RX ADMIN — PIPERACILLIN AND TAZOBACTAM 3375 MG: 3; .375 INJECTION, POWDER, LYOPHILIZED, FOR SOLUTION INTRAVENOUS at 22:41

## 2024-04-25 RX ADMIN — CITALOPRAM HYDROBROMIDE 10 MG: 20 TABLET ORAL at 22:23

## 2024-04-25 RX ADMIN — DIPHENHYDRAMINE HYDROCHLORIDE 25 MG: 50 INJECTION, SOLUTION INTRAMUSCULAR; INTRAVENOUS at 01:02

## 2024-04-25 RX ADMIN — SODIUM CHLORIDE 50 ML/HR: 900 INJECTION INTRAVENOUS at 22:39

## 2024-04-25 RX ADMIN — TIOTROPIUM BROMIDE AND OLODATEROL 2 PUFF: 3.124; 2.736 SPRAY, METERED RESPIRATORY (INHALATION) at 11:51

## 2024-04-25 RX ADMIN — ATORVASTATIN CALCIUM 40 MG: 40 TABLET, FILM COATED ORAL at 22:23

## 2024-04-25 RX ADMIN — FLUCONAZOLE 200 MG: 2 INJECTION, SOLUTION INTRAVENOUS at 09:28

## 2024-04-25 RX ADMIN — SODIUM CHLORIDE, PRESERVATIVE FREE 10 ML: 5 INJECTION INTRAVENOUS at 22:26

## 2024-04-25 RX ADMIN — APIXABAN 5 MG: 5 TABLET, FILM COATED ORAL at 22:24

## 2024-04-25 RX ADMIN — METOPROLOL SUCCINATE 50 MG: 50 TABLET, FILM COATED, EXTENDED RELEASE ORAL at 22:24

## 2024-04-25 ASSESSMENT — PAIN SCALES - GENERAL
PAINLEVEL_OUTOF10: 0

## 2024-04-25 ASSESSMENT — PAIN SCALES - WONG BAKER
WONGBAKER_NUMERICALRESPONSE: NO HURT

## 2024-04-25 NOTE — CARE COORDINATION
[facility] is agreeable to accept pt when a pre-cert is obtained per Jonh. Notified NIKOLAS/Dyan to start the pre-cert.  TE

## 2024-04-25 NOTE — PROGRESS NOTES
Occupational Therapy  [facility] ACUTE CARE OCCUPATIONAL THERAPY EVALUATION  Clau Bueno, 1950, 3101/3101-A, 4/25/2024    Discharge Recommendation: Encourage facility for moderate post-acute rehabilitation, anticipate 1-2 hours per day and 5 days per week.    History  Elim IRA:  The primary encounter diagnosis was Delirium. Diagnoses of Urinary tract infection without hematuria, site unspecified, COPD exacerbation (HCC), Acute respiratory failure with hypoxia and hypercapnia (HCC), Acute urinary retention, Hyperkalemia, GAUDENCIO (acute kidney injury) (HCC), and Anemia, unspecified type were also pertinent to this visit.  Patient  has a past medical history of Arthritis, Former smoker, GERD (gastroesophageal reflux disease), H/O echocardiogram, History of nuclear stress test, Hx of fall, Hyperlipidemia, Hypertension, Obstructive sleep apnea, Pleural effusion, Shortness of breath, and Unspecified cerebral artery occlusion with cerebral infarction.  Patient  has a past surgical history that includes joint replacement; Colonoscopy; and Bunionectomy.    Subjective:  Patient states: \"Well, duh! I dropped it on the floor and now I can't get it\".    Pain:  Denied.    Communication with other providers: Nurse, CM, co-eval with PT Amor albrecht CM note.  Restrictions: General Precautions, Fall Risk     Home Setup/Prior level of function  Social/Functional History  Lives With: Significant other  Type of Home: House  Home Layout: One level  Bathroom Shower/Tub: Walk-in shower  Bathroom Equipment: Shower chair, Grab bars in shower  Receives Help From: Family  ADL Assistance: Independent  Homemaking Assistance: Needs assistance  Ambulation Assistance: Independent (cane PRN)  Transfer Assistance: Independent  Active : No  Additional Comments: will need confirmation as pt is a poor historian with noted dementia per chart notes    Examination of body systems (includes body structures/functions, activity/participation  moderate post-acute rehabilitation, anticipate 1-2 hours per day and 5 days per week.    Complexity: Moderate   Prognosis: Good, no significant barriers to participation at this time.   Occupational Therapy Plan  Times Per Week: 3x+  Times Per Day: Once a day  Current Treatment Recommendations: Strengthening, ROM, Cognitive reorientation, Pain management, Balance training, Functional mobility training, Safety education & training, Endurance training, Patient/Caregiver education & training, Equipment evaluation, education, & procurement, Neuromuscular re-education, Positioning, Self-Care / ADL, Home management training, Cognitive/Perceptual training, Coordination training       Goals: To be achieved prior to discharge  Goal 1: Pt will perform UE ADLs independently   Goal 2: Pt will perform LE ADLs Darleen  Goal 3: Pt will perform toileting independently   Goal 4: Pt will perform HH distance functional mobility Darleen in preparation for return to home   Goal 5: Pt will perform functional transfers to/from bed, chair, and toilet Darleen  Goal 6: Pt will perform therex/theract in order to increase functional activity tolerance  Goal 7: Pt will perform feeding task w/ 0 verbal cues and SBA to demonstrate increased capability to perform ADLs    Treatment plan:  Pt will perform therex/theract in order to increase functional activity tolerance in preparation for ADL participation.     Recommendations for NURSING activity: Up to chair for all 3 meals and up to BSC for all toileting needs. Pt requires assist for all meals.     Time:   Time in: 1051  Time out: 1126  Timed treatment minutes: 25  Total time: 35 minutes    Electronically signed by:    JESSIE Santacruz/TALI [id number]  4/25/2024, 11:48 AM

## 2024-04-25 NOTE — CONSULTS
[facility] ACUTE CARE PHYSICAL THERAPY EVALUATION  Clau Bueno, 1950, 3101/3101-A, 4/25/2024    History  Chitina:  The primary encounter diagnosis was Delirium. Diagnoses of Urinary tract infection without hematuria, site unspecified, COPD exacerbation (HCC), Acute respiratory failure with hypoxia and hypercapnia (HCC), Acute urinary retention, Hyperkalemia, GAUDENCIO (acute kidney injury) (HCC), and Anemia, unspecified type were also pertinent to this visit.  Patient  has a past medical history of Arthritis, Former smoker, GERD (gastroesophageal reflux disease), H/O echocardiogram, History of nuclear stress test, Hx of fall, Hyperlipidemia, Hypertension, Obstructive sleep apnea, Pleural effusion, Shortness of breath, and Unspecified cerebral artery occlusion with cerebral infarction.  Patient  has a past surgical history that includes joint replacement; Colonoscopy; and Bunionectomy.    Discharge Recommendation: Encourage facility for moderate post-acute rehabilitation, anticipate 1-2 hours per day and 5 days per week.    Subjective:    Patient states:  \"I need to get home and be around normal people\".      Pain:  pt reports mild back pain, did not rate.      Communication with other providers:  Handoff to RN, co-evaluation with Ana Paula GARCIA to maximize safety and for tolerance, CM for discharge recs     Restrictions: general precautions, fall risk     Home Setup/Prior level of function  Social/Functional History  Lives With: Significant other  Type of Home: House  Home Layout: One level  Bathroom Shower/Tub: Walk-in shower  Bathroom Equipment: Shower chair, Grab bars in shower  Home Equipment: Cane  Receives Help From: Family  ADL Assistance: Independent  Homemaking Assistance: Needs assistance  Ambulation Assistance: Independent (cane PRN)  Transfer Assistance: Independent  Active : No  Additional Comments: will need confirmation as pt is a poor historian with noted dementia per chart  reports utilizing a cane for ambulation PRN. Will need confirmation for all PLOF as pt is a poor historian with noted confusion and dementia. Pt currently requires modA for bed mobility; Iraida for STS; and CGA for functional transfers and gait training. Pt demonstrates a significant decline with his overall functional mobility, reduced strength, impaired balance, impaired gait, and reduced activity tolerance. Pt will continue to benefit form acute PT services with discharge to facility for moderate post acute rehab to address above noted deficits.     Complexity: moderate     Prognosis: Good, no significant barriers to participation at this time.   General Plan: 3-5 times per week       Equipment: TBD at next level of care     Goals:  Short Term Goals  Time Frame for Short Term Goals: 1 week or until discharge  Short Term Goal 1: Pt will be able to perform all aspects of bed mobility with Darleen  Short Term Goal 2: Pt will be able to perform STS using LRAD with Darleen  Short Term Goal 3: Pt will be able to perform functional transfers using LRAD with Darleen  Short Term Goal 4: Pt will be able to ambulate 150ft using LRAD with Darleen       Treatment plan:  Bed mobility, transfers, balance, gait, TA, TX, neuro re-ed     Recommendations for NURSING mobility: stand pivot transfers using FWW with CGA    Time:   Time in: 1051  Time out: 1119  Timed treatment minutes: 18  Total time: 28    Electronically signed by:    Amor Mari PT, DPT  4/25/2024, 12:43 PM

## 2024-04-25 NOTE — PROGRESS NOTES
[facility]  DEPARTMENT OF SPEECH/LANGUAGE PATHOLOGY  DAILY PROGRESS NOTE  Clau Bueno  4/25/2024  [id number]  Hyperkalemia [E87.5]  Delirium [R41.0]  COPD exacerbation (HCC) [J44.1]  GAUDENCIO (acute kidney injury) (HCC) [N17.9]  Acute urinary retention [R33.8]  Acute respiratory failure with hypoxia and hypercapnia (HCC) [J96.01, J96.02]  Urinary tract infection without hematuria, site unspecified [N39.0]  Anemia, unspecified type [D64.9]  Acute metabolic encephalopathy [G93.41]  No Known Allergies      Pt was seen this date for dysphagia treatment.       IMPRESSION AND RECOMMENDATIONS: Clau Bueno was seen for dysphagia follow-up/reassessment for diet advancement. He was seated upright in chair, alert, confused, cooperative given cues. Required assistance with feeding. He accepted PO trials of thin liquids via cup/straw, puree, and regular solids. Oropharyngeal swallow WFL with intact labial seal, mastication, oral clearance, and no s/s aspiration across all trials. Pt was noted to be impulsive with liquid intake via straw and required cues for appropriate rate/volume of intake.    Recommend advancement to regular diet, continued thin liquids. Pt will benefit from assistance with meals d/t cognitive and physical limitations, impulsivity with intake. No further acute SLP needs identified.    GOALS (current status in bold):  Short-term Goals  Timeframe for Short-term Goals: 1 week  Goal 1: Pt will tolerate Full liquid diet/Thins by small straw  sips without s/s aspiration 100% met  Goal 2: Pt will participate in trials for advancement with adequate mastication/clearance and 0 s/s aspiration 100% met  Goal 3: Caregivers will use aspiration precautions for all  PO intake 100% d/w RN who verbalized agreement with recommendations      EDUCATION: recommendations d/w RN    PAIN RATING (0-10 Scale): 0/denied  Time in/Time out: SLP Individual Minutes  Time In: 1145  Time Out: 1200  Minutes:

## 2024-04-25 NOTE — PLAN OF CARE
Problem: Discharge Planning  Goal: Discharge to home or other facility with appropriate resources  4/25/2024 0833 by Margaret Baxter RN  Outcome: Progressing  4/25/2024 0307 by Sylvie Cloud RN  Outcome: Progressing     Problem: Pain  Goal: Verbalizes/displays adequate comfort level or baseline comfort level  4/25/2024 0833 by Margaret Baxter RN  Outcome: Progressing  4/25/2024 0307 by Sylvie Cloud RN  Outcome: Progressing     Problem: Safety - Adult  Goal: Free from fall injury  4/25/2024 0833 by Margaret Baxter RN  Outcome: Progressing  4/25/2024 0307 by Sylvie Cloud RN  Outcome: Progressing     Problem: Skin/Tissue Integrity  Goal: Absence of new skin breakdown  Description: 1.  Monitor for areas of redness and/or skin breakdown  2.  Assess vascular access sites hourly  3.  Every 4-6 hours minimum:  Change oxygen saturation probe site  4.  Every 4-6 hours:  If on nasal continuous positive airway pressure, respiratory therapy assess nares and determine need for appliance change or resting period.  4/25/2024 0833 by Margaret Baxter RN  Outcome: Progressing  4/25/2024 0307 by Sylvie Cloud RN  Outcome: Progressing     Problem: Chronic Conditions and Co-morbidities  Goal: Patient's chronic conditions and co-morbidity symptoms are monitored and maintained or improved  4/25/2024 0833 by Margaret Baxter RN  Outcome: Progressing  4/25/2024 0307 by Sylvie Cloud RN  Outcome: Progressing     Problem: Confusion  Goal: Confusion, delirium, dementia, or psychosis is improved or at baseline  Description: INTERVENTIONS:  1. Assess for possible contributors to thought disturbance, including medications, impaired vision or hearing, underlying metabolic abnormalities, dehydration, psychiatric diagnoses, and notify attending LIP  2. [place] high risk fall precautions, as indicated  3. Provide frequent short contacts to provide reality reorientation, refocusing and direction  4. Decrease environmental stimuli, including noise  as appropriate  5. Monitor and intervene to maintain adequate nutrition, hydration, elimination, sleep and activity  6. If unable to ensure safety without constant attention obtain sitter and review sitter guidelines with assigned personnel  7. Initiate Psychosocial CNS and Spiritual Care consult, as indicated  4/25/2024 0833 by Margaret Baxter, RN  Outcome: Progressing  4/25/2024 0307 by Sylvie Cloud, RN  Outcome: Progressing

## 2024-04-25 NOTE — DISCHARGE INSTR - COC
Continuity of Care Form    Patient Name: Clau Bueno   :  1950  MRN:  [id number]    Admit date:  2024  Discharge date:  24    Code Status Order: Limited   Advance Directives:     Admitting Physician:  Karol Lyle MD  PCP: Yuan Sol MD    Discharging Nurse: FARA Smith  Discharging Hospital Unit/Room#: 3101/3101-A  Discharging Unit Phone Number: [phone]    Emergency Contact:   Extended Emergency Contact Information  Primary Emergency Contact: KOBE MAHAJAN  Home Phone: [phone]  Mobile Phone: [phone]  Relation: Other Relative  Preferred language: English   needed? No  Secondary Emergency Contact: Poornima De  Home Phone: [phone]  Relation: Brother/Sister    Past Surgical History:  Past Surgical History:   Procedure Laterality Date    BUNIONECTOMY      right great toe    COLONOSCOPY      JOINT REPLACEMENT      rigth total knee       Immunization History:   Immunization History   Administered Date(s) Administered    Influenza Virus Vaccine 10/17/2018    Pneumococcal, PCV-13, PREVNAR 13, (age 6w+), IM, 0.5mL 2019       Active Problems:  Patient Active Problem List   Diagnosis Code    Hemothorax J94.2    Hemothorax on right J94.2    Falls frequently R29.6    Rotator cuff strain, left, initial encounter S46.012A    Former smoker Z87.891    Shortness of breath R06.02    Obstructive sleep apnea G47.33    CVA, old, disturbances of vision I69.398, H53.9    Hemorrhagic stroke (HCC) I61.9    Hemiparesis of left nondominant side as late effect of nontraumatic intracerebral hemorrhage (HCC) I69.154    Dysarthria due to acute stroke (HCC) I63.9, R47.1    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    Essential hypertension I10    Hyperlipidemia E78.5    Gait disturbance R26.9    Stroke due to intracerebral hemorrhage (HCC) I61.9    Sepsis (HCC) A41.9    Acute metabolic encephalopathy G93.41       Isolation/Infection:   Isolation            No Isolation           Patient Infection Status       None to display                     Nurse Assessment:  Last Vital Signs: /82   Pulse 97   Temp 97.5 °F (36.4 °C) (Oral)   Resp 15   Ht 1.829 m (6' 0.01\")   Wt 101 kg (222 lb 10.6 oz)   SpO2 96%   BMI 30.19 kg/m²     Last documented pain score (0-10 scale): Pain Level: 0  Last Weight:   Wt Readings from Last 1 Encounters:   04/25/24 101 kg (222 lb 10.6 oz)     Mental Status:  disoriented and alert    IV Access:  - None    Nursing Mobility/ADLs:  Walking   Assisted  Transfer  Assisted  Bathing  Dependent  Dressing  Dependent  Toileting  Dependent  Feeding  Assisted  Med Admin  Assisted  Med Delivery   whole    Wound Care Documentation and Therapy:        Elimination:  Continence:   Bowel: No  Bladder: Yes  Urinary Catheter: Insertion Date:      Colostomy/Ileostomy/Ileal Conduit: No       Date of Last BM: 4/25/24    Intake/Output Summary (Last 24 hours) at 4/25/2024 1540  Last data filed at 4/25/2024 0826  Gross per 24 hour   Intake 1111.84 ml   Output 3450 ml   Net -2338.16 ml     I/O last 3 completed shifts:  In: 1111.8 [P.O.:380; I.V.:731.8]  Out: 4700 [Urine:4700]    Safety Concerns:     Sundowners Sundrome, History of Falls (last 30 days), and At Risk for Falls    Impairments/Disabilities:      Vision      Patient's personal belongings (please select all that are sent with patient):  Glasses, Dentures lower    RN SIGNATURE:  Electronically signed by Savannah Singh RN on 4/26/24 at 10:15 AM EDT    CASE MANAGEMENT/SOCIAL WORK SECTION    Inpatient Status Date: ***    Readmission Risk Assessment Score:  Readmission Risk              Risk of Unplanned Readmission:  23           Discharging to Facility/ Agency   Name: [facility]  Address: [address]   Phone: [phone]  Fax: [phone]    Dialysis Facility (if applicable)   Name:  Address:  Dialysis Schedule:  Phone:  Fax:    PHYSICIAN SECTION    Nutrition Therapy:  Current Nutrition Therapy:   - Oral Diet:

## 2024-04-25 NOTE — PLAN OF CARE
Problem: Discharge Planning  Goal: Discharge to home or other facility with appropriate resources  Outcome: Progressing     Problem: Pain  Goal: Verbalizes/displays adequate comfort level or baseline comfort level  Outcome: Progressing     Problem: Safety - Adult  Goal: Free from fall injury  Outcome: Progressing     Problem: Chronic Conditions and Co-morbidities  Goal: Patient's chronic conditions and co-morbidity symptoms are monitored and maintained or improved  Outcome: Progressing     Problem: Confusion  Goal: Confusion, delirium, dementia, or psychosis is improved or at baseline  Description: INTERVENTIONS:  1. Assess for possible contributors to thought disturbance, including medications, impaired vision or hearing, underlying metabolic abnormalities, dehydration, psychiatric diagnoses, and notify attending LIP  2. [place] high risk fall precautions, as indicated  3. Provide frequent short contacts to provide reality reorientation, refocusing and direction  4. Decrease environmental stimuli, including noise as appropriate  5. Monitor and intervene to maintain adequate nutrition, hydration, elimination, sleep and activity  6. If unable to ensure safety without constant attention obtain sitter and review sitter guidelines with assigned personnel  7. Initiate Psychosocial CNS and Spiritual Care consult, as indicated  Outcome: Progressing

## 2024-04-25 NOTE — CONSULTS
Consult completed. Procedure/rationale explained to pt & consent obtained. Nexiva 20g 1.75\" PIV initiated using UltraSound-guided technique without difficulty/complications. Pt tolerated well and no other c/o or needs noted or reported. rosleine Mercer RN notified.     Consult IV/PICC team if patient's needs change.

## 2024-04-25 NOTE — PROGRESS NOTES
[address] [address]   Progress Note  [facility] 1 2 3 4 5      Date: 2024   Patient: Clau Bueno   : 1950   DOA: 2024   MRN: [id number]   ROOM#: 3101/3101-A     Admit Date: 2024     Collaborating Urologist on Call at time of admission: Dr. Villeda  Chief Complaint:   Chief Complaint   Patient presents with    Altered Mental Status       Subjective:     Patient seen and examined.  Patient resting in bed.  No complaints of pain.  Indwelling Garrett catheter draining well without any hematuria    REVIEW OF SYSTEMS:     14 systems reviewed and negative except in HPI    Objective:    Vitals:    BP (!) 142/95   Pulse 93   Temp 98.1 °F (36.7 °C) (Oral)   Resp 20   Ht 1.829 m (6' 0.01\")   Wt 101 kg (222 lb 10.6 oz)   SpO2 100%   BMI 30.19 kg/m²    Temp  Av.7 °F (36.5 °C)  Min: 97.4 °F (36.3 °C)  Max: 98.1 °F (36.7 °C)     Intake/Output Summary (Last 24 hours) at 2024 0855  Last data filed at 2024 0826  Gross per 24 hour   Intake 1111.84 ml   Output 3450 ml   Net -2338.16 ml       Physical Exam:   Gen: Pleasant male, in NAD  Neuro: Non-focal  Resp: Unlabored breathing  CV: Regular rate and rhythm  Abd: soft, non-distended, non-tender to palpation, no rebound  Back:   No CVAT  : Indwelling Garrett catheter  Ext: No edema of bilateral LEs    Labs:   WBC:    Lab Results   Component Value Date/Time    WBC 5.4 2024 04:09 AM      Hemoglobin/Hematocrit:    Lab Results   Component Value Date/Time    HGB 9.1 2024 04:09 AM    HCT 31.8 2024 04:09 AM      BMP:   Lab Results   Component Value Date/Time     2024 05:21 AM    K 4.2 2024 05:21 AM     2024 05:21 AM    CO2 30 2024 05:21 AM    BUN 47 2024 05:21 AM    CREATININE 1.8 2024 05:21 AM    CALCIUM 8.5 2024 05:21 AM    GFRAA >60 2020 11:05 AM    LABGLOM 39 2024 05:21 AM       Urine Culture:   Lab Results   Component Value Date/Time

## 2024-04-25 NOTE — PROGRESS NOTES
V2.0  [facility] Hospitalist Progress Note      Name:  Clau Bueno /Age/Sex: 1950  (74 y.o. male)   MRN & CSN:  [id number] & [id number] Encounter Date/Time: 2024 12:59 PM EDT    Location:  [address] [place] [address] PCP: Yuan Sol MD       Hospital Day: 3    Assessment and Plan:   Clau Bueno is a 74 y.o. male who presents with Acute metabolic encephalopathy      Plan:    Acute encephalopathy-improving  Patient with history of dementia as well as an acute infection.  Alert and oriented at baseline is 1-2.  Treat infection  Avoid delirium    Urinary tract infection  UA consistent with infection.  Prior cultures have grown both Pseudomonas and Enterococcus  IV Zosyn  Add Diflucan to cover for yeast  Follow-up urine and blood cultures: Urine culture also growing yeast    Urinary retention  Urology consulted  Garrett in place: To remain in place at discharge    Acute kidney injury  Hyperkalemia  Patient creatinine went 2.9 presentation and potassium up to 5.4.  Improved with Garrett placement IV hydration  Continue gentle hydration  Continue Garrett  Avoid nephrotoxic agents    Chronic respiratory failure with hypoxia  No other concern for respiratory issues at this time  Continue supplemental oxygen as needed: Baseline 3 L    HTN   -Continue toprol      History of Afib   - continue Eliquis and BB     History of CVA   - continue Eliquis, ASA         Diet ADULT DIET; Regular   DVT Prophylaxis [] Lovenox, []  Heparin, [] SCDs, [] Ambulation,    [x] Eliquis, [] Xarelto  [] Coumadin [] other   Code Status Limited   Disposition From: [facility]  Expected Disposition: [facility]  Estimated Date of Discharge: Medically ready for discharge  Patient requires continued admission due to placement   Surrogate Decision Maker/ POA      Personally reviewed Lab Studies and Imaging     Discussed management of the case with urology who recommended Garrett placement    EKG interpreted personally and results no acute ST changes    Imaging that was  interpreted personally includes CT abdomen and results  hydronephrosis    Subjective:     Chief Complaint: Altered Mental Status     Patient's mentation and labs are improving.  Discussed the case with urology and he will be leaving with a Garrett.  Plan after PT/OT evaluations is placement.    Review of Systems:    Review of Systems   Unable to perform ROS: Dementia         Objective:     Intake/Output Summary (Last 24 hours) at 4/25/2024 1252  Last data filed at 4/25/2024 0826  Gross per 24 hour   Intake 1111.84 ml   Output 3450 ml   Net -2338.16 ml          Vitals:   Vitals:    04/25/24 1138   BP: 120/65   Pulse: 88   Resp: 18   Temp: 97.5 °F (36.4 °C)   SpO2: 96%       Physical Exam:     Physical Exam  Constitutional:       General: He is not in acute distress.     Appearance: Normal appearance.   HENT:      Head: Normocephalic and atraumatic.      Nose: Nose normal.      Mouth/Throat:      Mouth: Mucous membranes are moist.      Pharynx: Oropharynx is clear.   Eyes:      Extraocular Movements: Extraocular movements intact.      Conjunctiva/sclera: Conjunctivae normal.      Pupils: Pupils are equal, round, and reactive to light.   Cardiovascular:      Rate and Rhythm: Normal rate and regular rhythm.      Pulses: Normal pulses.      Heart sounds: Normal heart sounds.   Pulmonary:      Effort: Pulmonary effort is normal.   Abdominal:      General: Abdomen is flat. Bowel sounds are normal.      Palpations: Abdomen is soft.   Musculoskeletal:         General: Normal range of motion.      Cervical back: Normal range of motion and neck supple.   Skin:     General: Skin is warm and dry.   Neurological:      General: No focal deficit present.      Mental Status: He is alert. Mental status is at baseline. He is disoriented.   Psychiatric:         Mood and Affect: Mood normal.         Behavior: Behavior normal.         Thought Content: Thought content normal.         Medications:   Medications:    fluconazole  200 mg

## 2024-04-25 NOTE — CARE COORDINATION
Pre-cert received for [facility] and is good starting tomorrow per NIKOLAS/Dyan.  Notified Jessa Sommer/Jeremias and TARIQ/Deanna via VM.  D/C INSTRUCTIONS ARE ON FRONT OF PACKET LOCATED WITH THE SOFT CHART.  TE

## 2024-04-25 NOTE — PROGRESS NOTES
8532 This nurse assessed the patient and he is alert to name, , and location, but does not know the year and is very drowsy. Patient eyes remain closed unless prompted to open them. Per SLP note yesterday () patient is to have alert intake only, therefore this patient will not be receiving anything oral at this time as this nurse does not deem him alert enough.    Margaret Baxter RN

## 2024-04-25 NOTE — CARE COORDINATION
Precert initiated via [employer]: APPROVED  [employer] Auth ID [id number]  04/26/2024-04/30/2024  Approved

## 2024-04-25 NOTE — CARE COORDINATION
PT/OT notified CM that they are recommending SNF.  CM notified pt's POA/Deanna and verified that she still wants pt to go to [facility] per CM note from yesterday.  She is agreeable for the referral to be made to [facility].  Referral made to Jessa/Jeremias and informed her that pt is medically ready for d/c per Dr in [facility].  She will review clinicals and will notify CM of decision to accept or not.  Pt will require a pre-cert.  TE

## 2024-04-26 VITALS
WEIGHT: 225.75 LBS | RESPIRATION RATE: 18 BRPM | BODY MASS INDEX: 30.58 KG/M2 | SYSTOLIC BLOOD PRESSURE: 124 MMHG | HEART RATE: 110 BPM | HEIGHT: 72 IN | OXYGEN SATURATION: 93 % | TEMPERATURE: 97.7 F | DIASTOLIC BLOOD PRESSURE: 78 MMHG

## 2024-04-26 LAB
ALBUMIN SERPL-MCNC: 4.1 GM/DL (ref 3.4–5)
ALP BLD-CCNC: 111 IU/L (ref 40–128)
ALT SERPL-CCNC: 14 U/L (ref 10–40)
ANION GAP SERPL CALCULATED.3IONS-SCNC: 11 MMOL/L (ref 7–16)
AST SERPL-CCNC: 23 IU/L (ref 15–37)
BASOPHILS ABSOLUTE: 0 K/CU MM
BASOPHILS RELATIVE PERCENT: 0.2 % (ref 0–1)
BILIRUB SERPL-MCNC: 1 MG/DL (ref 0–1)
BUN SERPL-MCNC: 35 MG/DL (ref 6–23)
CALCIUM SERPL-MCNC: 8.7 MG/DL (ref 8.3–10.6)
CHLORIDE BLD-SCNC: 107 MMOL/L (ref 99–110)
CO2: 30 MMOL/L (ref 21–32)
CREAT SERPL-MCNC: 1.4 MG/DL (ref 0.9–1.3)
DIFFERENTIAL TYPE: ABNORMAL
EOSINOPHILS ABSOLUTE: 0.2 K/CU MM
EOSINOPHILS RELATIVE PERCENT: 4.6 % (ref 0–3)
GFR SERPL CREATININE-BSD FRML MDRD: 53 ML/MIN/1.73M2
GLUCOSE SERPL-MCNC: 121 MG/DL (ref 70–99)
HCT VFR BLD CALC: 31.1 % (ref 42–52)
HEMOGLOBIN: 8.7 GM/DL (ref 13.5–18)
IMMATURE NEUTROPHIL %: 0.4 % (ref 0–0.43)
LYMPHOCYTES ABSOLUTE: 0.8 K/CU MM
LYMPHOCYTES RELATIVE PERCENT: 16.1 % (ref 24–44)
MCH RBC QN AUTO: 27.1 PG (ref 27–31)
MCHC RBC AUTO-ENTMCNC: 28 % (ref 32–36)
MCV RBC AUTO: 96.9 FL (ref 78–100)
MONOCYTES ABSOLUTE: 0.6 K/CU MM
MONOCYTES RELATIVE PERCENT: 13.2 % (ref 0–4)
NEUTROPHILS RELATIVE PERCENT: 65.5 % (ref 36–66)
NUCLEATED RBC %: 0 %
PDW BLD-RTO: 16.3 % (ref 11.7–14.9)
PLATELET # BLD: 91 K/CU MM (ref 140–440)
PMV BLD AUTO: 11.8 FL (ref 7.5–11.1)
POTASSIUM SERPL-SCNC: 3.8 MMOL/L (ref 3.5–5.1)
RBC # BLD: 3.21 M/CU MM (ref 4.6–6.2)
SEGMENTED NEUTROPHILS ABSOLUTE COUNT: 3.1 K/CU MM
SODIUM BLD-SCNC: 148 MMOL/L (ref 135–145)
TOTAL IMMATURE NEUTOROPHIL: 0.02 K/CU MM
TOTAL NUCLEATED RBC: 0 K/CU MM
TOTAL PROTEIN: 7.2 GM/DL (ref 6.4–8.2)
WBC # BLD: 4.8 K/CU MM (ref 4–10.5)

## 2024-04-26 PROCEDURE — 6370000000 HC RX 637 (ALT 250 FOR IP): Performed by: STUDENT IN AN ORGANIZED HEALTH CARE EDUCATION/TRAINING PROGRAM

## 2024-04-26 PROCEDURE — 6360000002 HC RX W HCPCS: Performed by: NURSE PRACTITIONER

## 2024-04-26 PROCEDURE — 2580000003 HC RX 258: Performed by: STUDENT IN AN ORGANIZED HEALTH CARE EDUCATION/TRAINING PROGRAM

## 2024-04-26 PROCEDURE — 85025 COMPLETE CBC W/AUTO DIFF WBC: CPT

## 2024-04-26 PROCEDURE — 6360000002 HC RX W HCPCS: Performed by: STUDENT IN AN ORGANIZED HEALTH CARE EDUCATION/TRAINING PROGRAM

## 2024-04-26 PROCEDURE — 36415 COLL VENOUS BLD VENIPUNCTURE: CPT

## 2024-04-26 PROCEDURE — 80053 COMPREHEN METABOLIC PANEL: CPT

## 2024-04-26 RX ORDER — AMOXICILLIN AND CLAVULANATE POTASSIUM 875; 125 MG/1; MG/1
1 TABLET, FILM COATED ORAL 2 TIMES DAILY
Qty: 10 TABLET | Refills: 0 | Status: SHIPPED | OUTPATIENT
Start: 2024-04-26 | End: 2024-05-01

## 2024-04-26 RX ORDER — FLUCONAZOLE 2 MG/ML
200 INJECTION, SOLUTION INTRAVENOUS EVERY 24 HOURS
Qty: 400 ML | Refills: 0 | Status: SHIPPED | OUTPATIENT
Start: 2024-04-26 | End: 2024-04-30

## 2024-04-26 RX ORDER — DIPHENHYDRAMINE HYDROCHLORIDE 50 MG/ML
25 INJECTION INTRAMUSCULAR; INTRAVENOUS ONCE
Status: COMPLETED | OUTPATIENT
Start: 2024-04-26 | End: 2024-04-26

## 2024-04-26 RX ADMIN — DIPHENHYDRAMINE HYDROCHLORIDE 25 MG: 50 INJECTION, SOLUTION INTRAMUSCULAR; INTRAVENOUS at 02:38

## 2024-04-26 RX ADMIN — CITALOPRAM HYDROBROMIDE 10 MG: 20 TABLET ORAL at 09:20

## 2024-04-26 RX ADMIN — FINASTERIDE 5 MG: 5 TABLET, FILM COATED ORAL at 09:19

## 2024-04-26 RX ADMIN — APIXABAN 5 MG: 5 TABLET, FILM COATED ORAL at 09:19

## 2024-04-26 RX ADMIN — PIPERACILLIN AND TAZOBACTAM 3375 MG: 3; .375 INJECTION, POWDER, LYOPHILIZED, FOR SOLUTION INTRAVENOUS at 06:18

## 2024-04-26 RX ADMIN — METOPROLOL SUCCINATE 50 MG: 50 TABLET, FILM COATED, EXTENDED RELEASE ORAL at 09:19

## 2024-04-26 RX ADMIN — PANTOPRAZOLE SODIUM 40 MG: 40 TABLET, DELAYED RELEASE ORAL at 06:20

## 2024-04-26 RX ADMIN — SODIUM CHLORIDE 25 ML/HR: 900 INJECTION INTRAVENOUS at 06:17

## 2024-04-26 ASSESSMENT — PAIN SCALES - WONG BAKER
WONGBAKER_NUMERICALRESPONSE: NO HURT
WONGBAKER_NUMERICALRESPONSE: NO HURT

## 2024-04-26 NOTE — PLAN OF CARE
Problem: Discharge Planning  Goal: Discharge to home or other facility with appropriate resources  4/26/2024 1019 by Savannah Singh RN  Outcome: Adequate for Discharge  4/26/2024 1011 by Savannah Singh RN  Outcome: Progressing  4/26/2024 0124 by Manuel Pedroza RN  Outcome: Progressing     Problem: Pain  Goal: Verbalizes/displays adequate comfort level or baseline comfort level  4/26/2024 1019 by Savannah Singh RN  Outcome: Adequate for Discharge  4/26/2024 1011 by Savannah Singh RN  Outcome: Progressing  4/26/2024 0124 by Manuel Pedroza RN  Outcome: Progressing     Problem: Safety - Adult  Goal: Free from fall injury  4/26/2024 1019 by Savannah Singh RN  Outcome: Adequate for Discharge  4/26/2024 1011 by Savannah Singh RN  Outcome: Progressing  4/26/2024 0124 by Manuel Pedroza RN  Outcome: Progressing     Problem: Skin/Tissue Integrity  Goal: Absence of new skin breakdown  Description: 1.  Monitor for areas of redness and/or skin breakdown  2.  Assess vascular access sites hourly  3.  Every 4-6 hours minimum:  Change oxygen saturation probe site  4.  Every 4-6 hours:  If on nasal continuous positive airway pressure, respiratory therapy assess nares and determine need for appliance change or resting period.  4/26/2024 1019 by Savannah Singh RN  Outcome: Adequate for Discharge  4/26/2024 1011 by Savannah Singh RN  Outcome: Progressing  4/26/2024 0124 by Manuel Pedroza RN  Outcome: Progressing     Problem: Chronic Conditions and Co-morbidities  Goal: Patient's chronic conditions and co-morbidity symptoms are monitored and maintained or improved  4/26/2024 1019 by Savannah Singh RN  Outcome: Adequate for Discharge  4/26/2024 1011 by Savannah Singh RN  Outcome: Progressing  4/26/2024 0124 by Manuel Pedroza RN  Outcome: Progressing     Problem: Confusion  Goal: Confusion, delirium, dementia, or psychosis is improved or at baseline  Description: INTERVENTIONS:  1. Assess for  possible contributors to thought disturbance, including medications, impaired vision or hearing, underlying metabolic abnormalities, dehydration, psychiatric diagnoses, and notify attending LIP  2. [place] high risk fall precautions, as indicated  3. Provide frequent short contacts to provide reality reorientation, refocusing and direction  4. Decrease environmental stimuli, including noise as appropriate  5. Monitor and intervene to maintain adequate nutrition, hydration, elimination, sleep and activity  6. If unable to ensure safety without constant attention obtain sitter and review sitter guidelines with assigned personnel  7. Initiate Psychosocial CNS and Spiritual Care consult, as indicated  4/26/2024 1019 by Savannah Singh, RN  Outcome: Adequate for Discharge  4/26/2024 0124 by Manuel Pedroza, RN  Outcome: Progressing

## 2024-04-26 NOTE — PLAN OF CARE
Problem: Discharge Planning  Goal: Discharge to home or other facility with appropriate resources  4/26/2024 1011 by Savannah Singh RN  Outcome: Progressing  4/26/2024 0124 by Manuel Pedroza RN  Outcome: Progressing     Problem: Pain  Goal: Verbalizes/displays adequate comfort level or baseline comfort level  4/26/2024 1011 by Savannah Singh RN  Outcome: Progressing  4/26/2024 0124 by Manuel Pedroza RN  Outcome: Progressing     Problem: Safety - Adult  Goal: Free from fall injury  4/26/2024 1011 by Savannah Singh RN  Outcome: Progressing  4/26/2024 0124 by Manuel Pedroza RN  Outcome: Progressing     Problem: Skin/Tissue Integrity  Goal: Absence of new skin breakdown  Description: 1.  Monitor for areas of redness and/or skin breakdown  2.  Assess vascular access sites hourly  3.  Every 4-6 hours minimum:  Change oxygen saturation probe site  4.  Every 4-6 hours:  If on nasal continuous positive airway pressure, respiratory therapy assess nares and determine need for appliance change or resting period.  4/26/2024 1011 by Savannah Singh RN  Outcome: Progressing  4/26/2024 0124 by Manuel Pedroza RN  Outcome: Progressing     Problem: Chronic Conditions and Co-morbidities  Goal: Patient's chronic conditions and co-morbidity symptoms are monitored and maintained or improved  4/26/2024 1011 by Savannah Singh RN  Outcome: Progressing  4/26/2024 0124 by Manuel Pedroza RN  Outcome: Progressing

## 2024-04-26 NOTE — CARE COORDINATION
Pt can go to [facility] today when medically ready.  Pre-cert is good 4/26-4/30.  Dr Oquendo is aware.  D/C INSTRUCTIONS ARE ON FRONT OF PACKET LOCATED WITH THE SOFT CHART.    TE    .Pt returning/new to [facility] at discharge. Please call report to [phone] and fax orders, AVS, and discharge summaries to [phone].

## 2024-04-26 NOTE — CARE COORDINATION
Transport arranged with [facility].  ETA is 1100.  Notified pt's nurse, TARIQ/Deanna, and Jessa/Jeremias.  TE

## 2024-04-30 ENCOUNTER — HOSPITAL ENCOUNTER (OUTPATIENT)
Age: 74
Setting detail: SPECIMEN
Discharge: HOME | End: 2024-04-30

## 2024-04-30 LAB
ALBUMIN SERPL-MCNC: 3.7 GM/DL (ref 3.4–5)
ALP BLD-CCNC: 119 IU/L (ref 40–129)
ALT SERPL-CCNC: 58 U/L (ref 10–40)
ANION GAP SERPL CALCULATED.3IONS-SCNC: 9 MMOL/L (ref 7–16)
AST SERPL-CCNC: 58 IU/L (ref 15–37)
BASOPHILS ABSOLUTE: 0 K/CU MM
BASOPHILS RELATIVE PERCENT: 0.2 % (ref 0–1)
BILIRUB SERPL-MCNC: 1.4 MG/DL (ref 0–1)
BUN SERPL-MCNC: 26 MG/DL (ref 6–23)
CALCIUM SERPL-MCNC: 8.7 MG/DL (ref 8.3–10.6)
CHLORIDE BLD-SCNC: 107 MMOL/L (ref 99–110)
CO2: 31 MMOL/L (ref 21–32)
CREAT SERPL-MCNC: 0.9 MG/DL (ref 0.9–1.3)
DIFFERENTIAL TYPE: ABNORMAL
EOSINOPHILS ABSOLUTE: 0.2 K/CU MM
EOSINOPHILS RELATIVE PERCENT: 3.2 % (ref 0–3)
GFR SERPL CREATININE-BSD FRML MDRD: >90 ML/MIN/1.73M2
GLUCOSE SERPL-MCNC: 106 MG/DL (ref 70–99)
HCT VFR BLD CALC: 32.7 % (ref 42–52)
HEMOGLOBIN: 9.2 GM/DL (ref 13.5–18)
IMMATURE NEUTROPHIL %: 0.2 % (ref 0–0.43)
LYMPHOCYTES ABSOLUTE: 1.1 K/CU MM
LYMPHOCYTES RELATIVE PERCENT: 20.3 % (ref 24–44)
MCH RBC QN AUTO: 26.8 PG (ref 27–31)
MCHC RBC AUTO-ENTMCNC: 28.1 % (ref 32–36)
MCV RBC AUTO: 95.3 FL (ref 78–100)
MONOCYTES ABSOLUTE: 0.7 K/CU MM
MONOCYTES RELATIVE PERCENT: 12.5 % (ref 0–4)
NEUTROPHILS RELATIVE PERCENT: 63.6 % (ref 36–66)
NUCLEATED RBC %: 0.6 %
PDW BLD-RTO: 17 % (ref 11.7–14.9)
PLATELET # BLD: 96 K/CU MM (ref 140–440)
PMV BLD AUTO: 12.1 FL (ref 7.5–11.1)
POTASSIUM SERPL-SCNC: 4.1 MMOL/L (ref 3.5–5.1)
RBC # BLD: 3.43 M/CU MM (ref 4.6–6.2)
SEGMENTED NEUTROPHILS ABSOLUTE COUNT: 3.4 K/CU MM
SODIUM BLD-SCNC: 147 MMOL/L (ref 135–145)
TOTAL IMMATURE NEUTOROPHIL: 0.01 K/CU MM
TOTAL NUCLEATED RBC: 0 K/CU MM
TOTAL PROTEIN: 6.7 GM/DL (ref 6.4–8.2)
WBC # BLD: 5.4 K/CU MM (ref 4–10.5)

## 2024-04-30 PROCEDURE — 36415 COLL VENOUS BLD VENIPUNCTURE: CPT

## 2024-04-30 PROCEDURE — 85025 COMPLETE CBC W/AUTO DIFF WBC: CPT

## 2024-04-30 PROCEDURE — 80053 COMPREHEN METABOLIC PANEL: CPT

## 2024-05-05 NOTE — DISCHARGE SUMMARY
Musculoskeletal:         General: Normal range of motion.      Cervical back: Normal range of motion and neck supple.   Skin:     General: Skin is warm and dry.   Neurological:      General: No focal deficit present.      Mental Status: He is alert. Mental status is at baseline. He is disoriented.   Psychiatric:         Mood and Affect: Mood normal.         Behavior: Behavior normal.         Thought Content: Thought content normal.                Labs and Imaging   No results found.    CBC: No results for input(s): \"WBC\", \"HGB\", \"PLT\" in the last 72 hours.  BMP:  No results for input(s): \"NA\", \"K\", \"CL\", \"CO2\", \"BUN\", \"CREATININE\", \"GLUCOSE\" in the last 72 hours.  Hepatic: No results for input(s): \"AST\", \"ALT\", \"BILITOT\", \"ALKPHOS\" in the last 72 hours.    Invalid input(s): \"ALB\"  Lipids:   Lab Results   Component Value Date/Time    CHOL 178 07/29/2012 09:00 AM    HDL 59 07/29/2012 09:00 AM    TRIG 73 07/29/2012 09:00 AM     Hemoglobin A1C:   Lab Results   Component Value Date/Time    LABA1C 5.7 07/29/2012 09:00 AM     TSH: No results found for: \"TSH\"  Troponin:   Lab Results   Component Value Date/Time    TROPONINT 0.011 03/02/2020 07:20 PM     Lactic Acid: No results for input(s): \"LACTA\" in the last 72 hours.  BNP: No results for input(s): \"PROBNP\" in the last 72 hours.  UA:  Lab Results   Component Value Date/Time    NITRU NEGATIVE 04/23/2024 06:10 PM    COLORU YELLOW 04/23/2024 06:10 PM    PHUR 5.0 04/23/2024 06:10 PM    WBCUA 666 04/23/2024 06:10 PM    RBCUA 58 04/23/2024 06:10 PM    MUCUS RARE 05/08/2020 12:35 PM    TRICHOMONAS NONE SEEN 04/23/2024 06:10 PM    YEAST MANY 04/23/2024 06:10 PM    BACTERIA OCCASIONAL 04/23/2024 06:10 PM    CLARITYU CLEAR 04/23/2024 06:10 PM    LEUKOCYTESUR LARGE NUMBER OR AMOUNT OBSERVED 04/23/2024 06:10 PM    UROBILINOGEN 0.2 04/23/2024 06:10 PM    BILIRUBINUR NEGATIVE 04/23/2024 06:10 PM    BLOODU MODERATE NUMBER OR AMOUNT OBSERVED 04/23/2024 06:10 PM    GLUCOSEU NEGATIVE